# Patient Record
Sex: FEMALE | Race: WHITE | Employment: OTHER
[De-identification: names, ages, dates, MRNs, and addresses within clinical notes are randomized per-mention and may not be internally consistent; named-entity substitution may affect disease eponyms.]

---

## 2017-01-19 ENCOUNTER — TELEPHONE (OUTPATIENT)
Dept: NEUROLOGY | Facility: CLINIC | Age: 72
End: 2017-01-19

## 2017-03-08 DIAGNOSIS — G20 PARKINSON'S DISEASE (HCC): ICD-10-CM

## 2017-03-28 ENCOUNTER — OFFICE VISIT (OUTPATIENT)
Dept: NEUROLOGY | Age: 72
End: 2017-03-28
Payer: MEDICARE

## 2017-03-28 VITALS
HEIGHT: 62 IN | DIASTOLIC BLOOD PRESSURE: 74 MMHG | BODY MASS INDEX: 23 KG/M2 | SYSTOLIC BLOOD PRESSURE: 132 MMHG | WEIGHT: 125 LBS | HEART RATE: 80 BPM

## 2017-03-28 DIAGNOSIS — G20 PARKINSON'S DISEASE (HCC): Primary | ICD-10-CM

## 2017-03-28 PROCEDURE — 1036F TOBACCO NON-USER: CPT | Performed by: PSYCHIATRY & NEUROLOGY

## 2017-03-28 PROCEDURE — 99213 OFFICE O/P EST LOW 20 MIN: CPT | Performed by: PSYCHIATRY & NEUROLOGY

## 2017-03-28 PROCEDURE — 3017F COLORECTAL CA SCREEN DOC REV: CPT | Performed by: PSYCHIATRY & NEUROLOGY

## 2017-03-28 PROCEDURE — 1123F ACP DISCUSS/DSCN MKR DOCD: CPT | Performed by: PSYCHIATRY & NEUROLOGY

## 2017-03-28 PROCEDURE — G8399 PT W/DXA RESULTS DOCUMENT: HCPCS | Performed by: PSYCHIATRY & NEUROLOGY

## 2017-03-28 PROCEDURE — 3014F SCREEN MAMMO DOC REV: CPT | Performed by: PSYCHIATRY & NEUROLOGY

## 2017-03-28 PROCEDURE — G8420 CALC BMI NORM PARAMETERS: HCPCS | Performed by: PSYCHIATRY & NEUROLOGY

## 2017-03-28 PROCEDURE — G8484 FLU IMMUNIZE NO ADMIN: HCPCS | Performed by: PSYCHIATRY & NEUROLOGY

## 2017-03-28 PROCEDURE — 1090F PRES/ABSN URINE INCON ASSESS: CPT | Performed by: PSYCHIATRY & NEUROLOGY

## 2017-03-28 PROCEDURE — G8427 DOCREV CUR MEDS BY ELIG CLIN: HCPCS | Performed by: PSYCHIATRY & NEUROLOGY

## 2017-03-28 PROCEDURE — 4040F PNEUMOC VAC/ADMIN/RCVD: CPT | Performed by: PSYCHIATRY & NEUROLOGY

## 2017-04-10 ENCOUNTER — TELEPHONE (OUTPATIENT)
Dept: NEUROLOGY | Age: 72
End: 2017-04-10

## 2017-06-03 DIAGNOSIS — G20 PARKINSON'S DISEASE (HCC): ICD-10-CM

## 2017-06-05 RX ORDER — CARBIDOPA AND LEVODOPA 50; 200 MG/1; MG/1
TABLET, EXTENDED RELEASE ORAL
Qty: 90 TABLET | Refills: 0 | Status: SHIPPED | OUTPATIENT
Start: 2017-06-05 | End: 2017-08-18 | Stop reason: SDUPTHER

## 2017-06-28 ENCOUNTER — OFFICE VISIT (OUTPATIENT)
Dept: NEUROLOGY | Age: 72
End: 2017-06-28
Payer: MEDICARE

## 2017-06-28 VITALS
DIASTOLIC BLOOD PRESSURE: 69 MMHG | BODY MASS INDEX: 23.26 KG/M2 | HEIGHT: 62 IN | SYSTOLIC BLOOD PRESSURE: 118 MMHG | HEART RATE: 96 BPM | WEIGHT: 126.4 LBS

## 2017-06-28 DIAGNOSIS — G20 PARKINSON'S DISEASE (HCC): Primary | ICD-10-CM

## 2017-06-28 PROCEDURE — 99213 OFFICE O/P EST LOW 20 MIN: CPT | Performed by: PSYCHIATRY & NEUROLOGY

## 2017-06-28 PROCEDURE — G8399 PT W/DXA RESULTS DOCUMENT: HCPCS | Performed by: PSYCHIATRY & NEUROLOGY

## 2017-06-28 PROCEDURE — G8427 DOCREV CUR MEDS BY ELIG CLIN: HCPCS | Performed by: PSYCHIATRY & NEUROLOGY

## 2017-06-28 PROCEDURE — 1036F TOBACCO NON-USER: CPT | Performed by: PSYCHIATRY & NEUROLOGY

## 2017-06-28 PROCEDURE — 1123F ACP DISCUSS/DSCN MKR DOCD: CPT | Performed by: PSYCHIATRY & NEUROLOGY

## 2017-06-28 PROCEDURE — 3017F COLORECTAL CA SCREEN DOC REV: CPT | Performed by: PSYCHIATRY & NEUROLOGY

## 2017-06-28 PROCEDURE — G8420 CALC BMI NORM PARAMETERS: HCPCS | Performed by: PSYCHIATRY & NEUROLOGY

## 2017-06-28 PROCEDURE — 1090F PRES/ABSN URINE INCON ASSESS: CPT | Performed by: PSYCHIATRY & NEUROLOGY

## 2017-06-28 PROCEDURE — 4040F PNEUMOC VAC/ADMIN/RCVD: CPT | Performed by: PSYCHIATRY & NEUROLOGY

## 2017-06-28 PROCEDURE — 3014F SCREEN MAMMO DOC REV: CPT | Performed by: PSYCHIATRY & NEUROLOGY

## 2017-06-28 RX ORDER — ESCITALOPRAM OXALATE 20 MG/1
20 TABLET ORAL DAILY
COMMUNITY
Start: 2017-06-26

## 2017-06-28 RX ORDER — CARBIDOPA/LEVODOPA 25MG-250MG
1 TABLET ORAL 4 TIMES DAILY
Qty: 360 TABLET | Refills: 3 | Status: SHIPPED | OUTPATIENT
Start: 2017-06-28 | End: 2017-08-21 | Stop reason: SDUPTHER

## 2017-06-28 RX ORDER — TRAZODONE HYDROCHLORIDE 50 MG/1
1 TABLET ORAL NIGHTLY
COMMUNITY
Start: 2017-06-12

## 2017-07-18 RX ORDER — ROTIGOTINE 8 MG/24H
PATCH, EXTENDED RELEASE TRANSDERMAL
Qty: 90 PATCH | Refills: 0 | Status: SHIPPED | OUTPATIENT
Start: 2017-07-18 | End: 2017-10-27 | Stop reason: SDUPTHER

## 2017-07-20 ENCOUNTER — APPOINTMENT (OUTPATIENT)
Dept: GENERAL RADIOLOGY | Age: 72
End: 2017-07-20
Payer: MEDICARE

## 2017-07-20 ENCOUNTER — HOSPITAL ENCOUNTER (EMERGENCY)
Age: 72
Discharge: HOME | End: 2017-07-20
Payer: MEDICARE

## 2017-07-20 VITALS
TEMPERATURE: 98.4 F | HEIGHT: 61 IN | SYSTOLIC BLOOD PRESSURE: 124 MMHG | BODY MASS INDEX: 23.41 KG/M2 | DIASTOLIC BLOOD PRESSURE: 83 MMHG | WEIGHT: 124 LBS | RESPIRATION RATE: 14 BRPM | OXYGEN SATURATION: 97 % | HEART RATE: 103 BPM

## 2017-07-20 DIAGNOSIS — S60.052A CONTUSION OF LEFT LITTLE FINGER WITHOUT DAMAGE TO NAIL, INITIAL ENCOUNTER: Primary | ICD-10-CM

## 2017-07-20 PROCEDURE — 29130 APPL FINGER SPLINT STATIC: CPT

## 2017-07-20 PROCEDURE — 99283 EMERGENCY DEPT VISIT LOW MDM: CPT

## 2017-07-20 PROCEDURE — 73130 X-RAY EXAM OF HAND: CPT

## 2017-07-20 ASSESSMENT — PAIN DESCRIPTION - ORIENTATION: ORIENTATION: LEFT

## 2017-07-20 ASSESSMENT — PAIN DESCRIPTION - DESCRIPTORS: DESCRIPTORS: THROBBING

## 2017-07-20 ASSESSMENT — PAIN SCALES - GENERAL
PAINLEVEL_OUTOF10: 5
PAINLEVEL_OUTOF10: 5

## 2017-07-20 ASSESSMENT — PAIN DESCRIPTION - LOCATION: LOCATION: FINGER (COMMENT WHICH ONE)

## 2017-07-20 ASSESSMENT — PAIN DESCRIPTION - ONSET: ONSET: ON-GOING

## 2017-07-20 ASSESSMENT — PAIN DESCRIPTION - PAIN TYPE: TYPE: ACUTE PAIN

## 2017-07-20 ASSESSMENT — PAIN DESCRIPTION - PROGRESSION: CLINICAL_PROGRESSION: GRADUALLY WORSENING

## 2017-07-20 ASSESSMENT — PAIN DESCRIPTION - FREQUENCY: FREQUENCY: CONTINUOUS

## 2017-08-18 DIAGNOSIS — G20 PARKINSON'S DISEASE (HCC): ICD-10-CM

## 2017-08-21 RX ORDER — CARBIDOPA AND LEVODOPA 50; 200 MG/1; MG/1
TABLET, EXTENDED RELEASE ORAL
Qty: 90 TABLET | Refills: 0 | Status: SHIPPED | OUTPATIENT
Start: 2017-08-21 | End: 2017-11-24 | Stop reason: SDUPTHER

## 2017-10-09 ENCOUNTER — OFFICE VISIT (OUTPATIENT)
Dept: NEUROLOGY | Age: 72
End: 2017-10-09
Payer: MEDICARE

## 2017-10-09 VITALS
HEIGHT: 62 IN | HEART RATE: 96 BPM | SYSTOLIC BLOOD PRESSURE: 109 MMHG | WEIGHT: 125.6 LBS | BODY MASS INDEX: 23.11 KG/M2 | DIASTOLIC BLOOD PRESSURE: 69 MMHG

## 2017-10-09 DIAGNOSIS — G20 PARKINSON'S DISEASE (HCC): Primary | ICD-10-CM

## 2017-10-09 PROCEDURE — 4040F PNEUMOC VAC/ADMIN/RCVD: CPT | Performed by: PSYCHIATRY & NEUROLOGY

## 2017-10-09 PROCEDURE — 3014F SCREEN MAMMO DOC REV: CPT | Performed by: PSYCHIATRY & NEUROLOGY

## 2017-10-09 PROCEDURE — G8420 CALC BMI NORM PARAMETERS: HCPCS | Performed by: PSYCHIATRY & NEUROLOGY

## 2017-10-09 PROCEDURE — 3017F COLORECTAL CA SCREEN DOC REV: CPT | Performed by: PSYCHIATRY & NEUROLOGY

## 2017-10-09 PROCEDURE — 1090F PRES/ABSN URINE INCON ASSESS: CPT | Performed by: PSYCHIATRY & NEUROLOGY

## 2017-10-09 PROCEDURE — 99213 OFFICE O/P EST LOW 20 MIN: CPT | Performed by: PSYCHIATRY & NEUROLOGY

## 2017-10-09 PROCEDURE — G8484 FLU IMMUNIZE NO ADMIN: HCPCS | Performed by: PSYCHIATRY & NEUROLOGY

## 2017-10-09 PROCEDURE — 1123F ACP DISCUSS/DSCN MKR DOCD: CPT | Performed by: PSYCHIATRY & NEUROLOGY

## 2017-10-09 PROCEDURE — 1036F TOBACCO NON-USER: CPT | Performed by: PSYCHIATRY & NEUROLOGY

## 2017-10-09 PROCEDURE — G8427 DOCREV CUR MEDS BY ELIG CLIN: HCPCS | Performed by: PSYCHIATRY & NEUROLOGY

## 2017-10-09 PROCEDURE — G8399 PT W/DXA RESULTS DOCUMENT: HCPCS | Performed by: PSYCHIATRY & NEUROLOGY

## 2017-10-09 RX ORDER — CARBIDOPA/LEVODOPA 25MG-250MG
1 TABLET ORAL
Qty: 720 TABLET | Refills: 3 | Status: SHIPPED | OUTPATIENT
Start: 2017-10-09 | End: 2017-11-27 | Stop reason: SDUPTHER

## 2017-10-09 RX ORDER — CARBIDOPA/LEVODOPA 25MG-250MG
1 TABLET ORAL
COMMUNITY
Start: 2017-09-05 | End: 2017-11-27 | Stop reason: SDUPTHER

## 2017-10-09 RX ORDER — AMANTADINE HYDROCHLORIDE 100 MG/1
100 CAPSULE, GELATIN COATED ORAL
Qty: 30 CAPSULE | Refills: 1 | Status: SHIPPED | OUTPATIENT
Start: 2017-10-09 | End: 2017-12-02 | Stop reason: SDUPTHER

## 2017-10-09 RX ORDER — DIAZEPAM 5 MG/1
1 TABLET ORAL PRN
Status: ON HOLD | COMMUNITY
Start: 2017-08-09 | End: 2021-06-10 | Stop reason: HOSPADM

## 2017-10-09 NOTE — PROGRESS NOTES
HPI:        Your patient, Kaitlin Aguero returns in the company of her  for ongoing neurologic management of her Parkinson's disease. She has been a very difficult patient to manage since her condition still fluctuates from hour to hour and visit to visit. She uses Neupro, the 8 mg patch in addition to the fast acting Sinemet 25/250 at a tablet every 2 hours or up to 8 full pills a day. Also she uses a slow release Sinemet 50/200 at bedtime and this works very well overnight but occasionally she uses a second pill through the night since she is getting up several times to use the bathroom. Still her toes may curl once in a while but not like before and typically this happens in the morning before she gets started on her Sinemet regime. Dyskinetic movements may occur for brief periods during the day but she tolerates these changes well and prefers to continue the same dosing of medicine. As usual we talked about alternatives including Rytary or even deep brain stimulation (DBS) but she wants to give these options some thought before considering any change in treatment plan. I also strongly encouraged her to see a movement disorder expert such as Dr. Umesh Paredes at [facility] since I will be leaving practice shortly. There has been no recent trauma, infection/fever or intoxication to complicate matters nor any new medical or surgical issue.                                     REVIEW OF SYSTEMS    CONSTITUTIONAL Weight: absent, Appetite: absent, Fatigue: absent      HEENT Ears: normal, Eyes: glasses, Visual disturbance: absent   RESPIRATORY Shortness of breath: absent, Cough: absent   CARDIOVASCULAR Chest pain: absent, Leg swelling :absent      GI Constipation: absent, Diarrhea: absent, Swallowing change: absent       Urinary frequency: absent, Urinary urgency: absent, Urinary incontinence: absent   MUSCULOSKELETAL Neck pain: absent, Back pain: absent, Stiffness: present, Muscle pain: absent, Joint pain: absent, Restless legs: absent   DERMATOLOGIC Hair loss: absent, Skin changes: absent   NEUROLOGIC Memory loss: absent, Confusion: present, Seizures: absent Trouble walking or imbalance: present, Dizziness: absent, Weakness: absent, Numbness: absent, Tremor: present, Spasm: absent, Speech difficulty: absent, Headache: absent, Light sensitivity: absent   PSYCHIATRIC Anxiety: present, Hallucination: absent, Mood disorder: absent   HEMATOLOGIC Abnormal bleeding: absent, Anemia: absent, Clotting disorder: absent, Lymph gland changes: absent                   Outpatient Prescriptions Marked as Taking for the 10/9/17 encounter (Office Visit) with Jens Lewis MD   Medication Sig Dispense Refill    carbidopa-levodopa (SINEMET)  MG per tablet Take 1 tablet by mouth every 2 hours      diazepam (VALIUM) 5 MG tablet Take 1 tablet by mouth as needed      carbidopa-levodopa (SINEMET CR)  MG per extended release tablet TAKE 1 TABLET BY MOUTH NIGHTLY 90 tablet 0    NEUPRO 8 MG/24HR PT24 Apply 1 patch to skin every day as directed by physician. Alternate area with each change of patch. 90 patch 0    traZODone (DESYREL) 50 MG tablet Take 1 tablet by mouth as needed      escitalopram (LEXAPRO) 10 MG tablet Take 1 tablet by mouth daily      ALPRAZolam (XANAX) 0.25 MG tablet Take 0.25 mg by mouth 3 times daily      Magnesium 400 MG TABS Take 1 tablet by mouth daily      Calcium Carb-Cholecalciferol (CALCIUM + D3) 600-200 MG-UNIT TABS Take 1 tablet by mouth daily      Multiple Vitamins-Calcium (ONE-A-DAY WOMENS PO) Take 1 tablet by mouth daily      alendronate (FOSAMAX) 70 MG tablet Take 70 mg by mouth every 7 days                                            PHYSICAL EXAMINATION                                              .                                                                                                     General Appearance:  Alert, cooperative, signs of distress with dyskinesias in hands, legs and trunk, appears stated age, well dressed and groomed   Head:  Normocephalic, no signs of trauma   Eyes:  Conjunctiva/corneas clear;  eyelids intact   Ears:  Normal external ear and canals   Nose: Nares normal, mucosa normal, no drainage    Throat: Lips and tongue normal; teeth normal; gums normal   Neck: Supple neck with intact flexion, extension and rotation; trachea midline; no adenopathy; thyroid: not enlarged; no carotid pulse abnormality   Back:   Symmetric, no curvature, ROM adequate   Lungs:   Respirations unlabored   Chest Wall:  No deformity   Heart:  Regular rate and rhythm   Abdomen:   No masses   Extremities: Extremities normal, no cyanosis, no edema   Pulses: Symmetric over head and neck   Skin: Skin color, texture normal, no rashes, no lesions or bruises   Lymph nodes: Cervical, supraclavicular nodes normal                                         NEUROLOGIC EXAMINATION    MENTAL STATUS: Alert, oriented, intact memory, no confusion, normal speech, normal language, no hallucination or delusion   CRANIAL NERVES: II     -      Visual fields intact to confrontation  III,IV,VI -  EOMs full, no afferent defect, no                      MOHNA, no ptosis  V     -     Normal facial sensation  VII    -     Normal facial symmetry  VIII   -     Intact hearing  IX,X -     Symmetrical palate  XI    -     Symmetrical shoulder shrug  XII   -     Midline tongue, no atrophy    MOTOR FUNCTION: Normal bulk, normal tone, normal power but can overcome proximal limbs; nearly continuous dyskinetic movements of the head and limbs and trunk    SENSORY FUNCTION: Normal touch, normal pin, normal vibration   CEREBELLAR FUNCTION: Intact fine motor control over upper limbs   REFLEX FUNCTION: Symmetric but reduced, no perverted reflex   STATION and GAIT Normal station, easily up from a chair, does not lose balance               ASSESSMENT and  PLAN:      In summary, your patient, Canda Hashimoto appears to have more dyskinesias since boosting her Sinemet to a full tablet every 2 hours. Obviously she prefers this to being stiff, slow and drooling. She has no new lateralizing or localizing neurologic deficits but prominent dyskinesias. There is no need for additional neurologic investigation but I have suggested a change in therapy. She will try Amantadine 100 mg in the morning to control these dyskinesias and hopefully will not have any hallucinations or other ill effects from the drug. Meanwhile she will continue the present combination of Neupro and Sinemet. I will remain available for questions or any other issues that may arise and she will return for reevaluation in just one month so I can see if there are any problems with the Amantadine.

## 2017-10-09 NOTE — LETTER
[id number] [facility]  [address] [address]  Phone: [phone]  Fax: [phone]    Maura Arce MD        October 9, 2017     Maicol Chapman, [address] [address]    Patient: Jet Frank  MR Number: [id number]  YOB: 1945  Date of Visit: 10/9/2017    Dear Dr. Bruno Adhikari:        HPI:        Your patient, Jet Frank returns in the company of her  for ongoing neurologic management of her Parkinson's disease. She has been a very difficult patient to manage since her condition still fluctuates from hour to hour and visit to visit. She uses Neupro, the 8 mg patch in addition to the fast acting Sinemet 25/250 at a tablet every 2 hours or up to 8 full pills a day. Also she uses a slow release Sinemet 50/200 at bedtime and this works very well overnight but occasionally she uses a second pill through the night since she is getting up several times to use the bathroom. Still her toes may curl once in a while but not like before and typically this happens in the morning before she gets started on her Sinemet regime. Dyskinetic movements may occur for brief periods during the day but she tolerates these changes well and prefers to continue the same dosing of medicine. As usual we talked about alternatives including Rytary or even deep brain stimulation (DBS) but she wants to give these options some thought before considering any change in treatment plan. I also strongly encouraged her to see a movement disorder expert such as Dr. Carolann Yoder at [facility] since I will be leaving practice shortly. There has been no recent trauma, infection/fever or intoxication to complicate matters nor any new medical or surgical issue.                                     REVIEW OF SYSTEMS    CONSTITUTIONAL Weight: absent, Appetite: absent, Fatigue: absent      HEENT Ears: normal, Eyes: glasses, Visual disturbance: absent  Multiple Vitamins-Calcium (ONE-A-DAY WOMENS PO) Take 1 tablet by mouth daily      alendronate (FOSAMAX) 70 MG tablet Take 70 mg by mouth every 7 days                                            PHYSICAL EXAMINATION                                              .                                                                                                     General Appearance:  Alert, cooperative, signs of distress with dyskinesias in hands, legs and trunk, appears stated age, well dressed and groomed   Head:  Normocephalic, no signs of trauma   Eyes:  Conjunctiva/corneas clear;  eyelids intact   Ears:  Normal external ear and canals   Nose: Nares normal, mucosa normal, no drainage    Throat: Lips and tongue normal; teeth normal; gums normal   Neck: Supple neck with intact flexion, extension and rotation; trachea midline; no adenopathy; thyroid: not enlarged; no carotid pulse abnormality   Back:   Symmetric, no curvature, ROM adequate   Lungs:   Respirations unlabored   Chest Wall:  No deformity   Heart:  Regular rate and rhythm   Abdomen:   No masses   Extremities: Extremities normal, no cyanosis, no edema   Pulses: Symmetric over head and neck   Skin: Skin color, texture normal, no rashes, no lesions or bruises   Lymph nodes: Cervical, supraclavicular nodes normal                                         NEUROLOGIC EXAMINATION    MENTAL STATUS: Alert, oriented, intact memory, no confusion, normal speech, normal language, no hallucination or delusion   CRANIAL NERVES: II     -      Visual fields intact to confrontation  III,IV,VI -  EOMs full, no afferent defect, no                      MOHAN, no ptosis  V     -     Normal facial sensation  VII    -     Normal facial symmetry  VIII   -     Intact hearing  IX,X -     Symmetrical palate  XI    -     Symmetrical shoulder shrug  XII   -     Midline tongue, no atrophy    MOTOR FUNCTION: Normal bulk, normal tone, normal power but can overcome

## 2017-10-09 NOTE — COMMUNICATION BODY
HPI:        Your patient, Hyun Rodriguez returns in the company of her  for ongoing neurologic management of her Parkinson's disease. She has been a very difficult patient to manage since her condition still fluctuates from hour to hour and visit to visit. She uses Neupro, the 8 mg patch in addition to the fast acting Sinemet 25/250 at a tablet every 2 hours or up to 8 full pills a day. Also she uses a slow release Sinemet 50/200 at bedtime and this works very well overnight but occasionally she uses a second pill through the night since she is getting up several times to use the bathroom. Still her toes may curl once in a while but not like before and typically this happens in the morning before she gets started on her Sinemet regime. Dyskinetic movements may occur for brief periods during the day but she tolerates these changes well and prefers to continue the same dosing of medicine. As usual we talked about alternatives including Rytary or even deep brain stimulation (DBS) but she wants to give these options some thought before considering any change in treatment plan. I also strongly encouraged her to see a movement disorder expert such as Dr. Garrison Blanton at [facility] since I will be leaving practice shortly. There has been no recent trauma, infection/fever or intoxication to complicate matters nor any new medical or surgical issue.                                     REVIEW OF SYSTEMS    CONSTITUTIONAL Weight: absent, Appetite: absent, Fatigue: absent      HEENT Ears: normal, Eyes: glasses, Visual disturbance: absent   RESPIRATORY Shortness of breath: absent, Cough: absent   CARDIOVASCULAR Chest pain: absent, Leg swelling :absent      GI Constipation: absent, Diarrhea: absent, Swallowing change: absent       Urinary frequency: absent, Urinary urgency: absent, Urinary incontinence: absent   MUSCULOSKELETAL Neck pain: absent, Back pain: absent, Stiffness: present, Muscle pain: absent, Joint pain: and trunk, appears stated age, well dressed and groomed   Head:  Normocephalic, no signs of trauma   Eyes:  Conjunctiva/corneas clear;  eyelids intact   Ears:  Normal external ear and canals   Nose: Nares normal, mucosa normal, no drainage    Throat: Lips and tongue normal; teeth normal; gums normal   Neck: Supple neck with intact flexion, extension and rotation; trachea midline; no adenopathy; thyroid: not enlarged; no carotid pulse abnormality   Back:   Symmetric, no curvature, ROM adequate   Lungs:   Respirations unlabored   Chest Wall:  No deformity   Heart:  Regular rate and rhythm   Abdomen:   No masses   Extremities: Extremities normal, no cyanosis, no edema   Pulses: Symmetric over head and neck   Skin: Skin color, texture normal, no rashes, no lesions or bruises   Lymph nodes: Cervical, supraclavicular nodes normal                                         NEUROLOGIC EXAMINATION    MENTAL STATUS: Alert, oriented, intact memory, no confusion, normal speech, normal language, no hallucination or delusion   CRANIAL NERVES: II     -      Visual fields intact to confrontation  III,IV,VI -  EOMs full, no afferent defect, no                      MOHAN, no ptosis  V     -     Normal facial sensation  VII    -     Normal facial symmetry  VIII   -     Intact hearing  IX,X -     Symmetrical palate  XI    -     Symmetrical shoulder shrug  XII   -     Midline tongue, no atrophy    MOTOR FUNCTION: Normal bulk, normal tone, normal power but can overcome proximal limbs; nearly continuous dyskinetic movements of the head and limbs and trunk    SENSORY FUNCTION: Normal touch, normal pin, normal vibration   CEREBELLAR FUNCTION: Intact fine motor control over upper limbs   REFLEX FUNCTION: Symmetric but reduced, no perverted reflex   STATION and GAIT Normal station, easily up from a chair, does not lose balance               ASSESSMENT and  PLAN:      In summary, your patient, Ricky Hua appears to have more dyskinesias since

## 2017-10-30 RX ORDER — ROTIGOTINE 8 MG/24H
PATCH, EXTENDED RELEASE TRANSDERMAL
Qty: 90 PATCH | Refills: 0 | Status: SHIPPED | OUTPATIENT
Start: 2017-10-30 | End: 2019-11-22

## 2017-11-07 DIAGNOSIS — G20 PARKINSON'S DISEASE (HCC): ICD-10-CM

## 2017-11-15 ENCOUNTER — OFFICE VISIT (OUTPATIENT)
Dept: NEUROLOGY | Age: 72
End: 2017-11-15
Payer: MEDICARE

## 2017-11-15 VITALS
HEART RATE: 92 BPM | BODY MASS INDEX: 23.66 KG/M2 | WEIGHT: 128.6 LBS | HEIGHT: 62 IN | DIASTOLIC BLOOD PRESSURE: 75 MMHG | SYSTOLIC BLOOD PRESSURE: 114 MMHG

## 2017-11-15 DIAGNOSIS — G20 PARKINSON'S DISEASE (HCC): Primary | ICD-10-CM

## 2017-11-15 PROCEDURE — 1090F PRES/ABSN URINE INCON ASSESS: CPT | Performed by: PSYCHIATRY & NEUROLOGY

## 2017-11-15 PROCEDURE — G8399 PT W/DXA RESULTS DOCUMENT: HCPCS | Performed by: PSYCHIATRY & NEUROLOGY

## 2017-11-15 PROCEDURE — 1036F TOBACCO NON-USER: CPT | Performed by: PSYCHIATRY & NEUROLOGY

## 2017-11-15 PROCEDURE — 4040F PNEUMOC VAC/ADMIN/RCVD: CPT | Performed by: PSYCHIATRY & NEUROLOGY

## 2017-11-15 PROCEDURE — 3014F SCREEN MAMMO DOC REV: CPT | Performed by: PSYCHIATRY & NEUROLOGY

## 2017-11-15 PROCEDURE — G8427 DOCREV CUR MEDS BY ELIG CLIN: HCPCS | Performed by: PSYCHIATRY & NEUROLOGY

## 2017-11-15 PROCEDURE — G8420 CALC BMI NORM PARAMETERS: HCPCS | Performed by: PSYCHIATRY & NEUROLOGY

## 2017-11-15 PROCEDURE — 99213 OFFICE O/P EST LOW 20 MIN: CPT | Performed by: PSYCHIATRY & NEUROLOGY

## 2017-11-15 PROCEDURE — 3017F COLORECTAL CA SCREEN DOC REV: CPT | Performed by: PSYCHIATRY & NEUROLOGY

## 2017-11-15 PROCEDURE — 1123F ACP DISCUSS/DSCN MKR DOCD: CPT | Performed by: PSYCHIATRY & NEUROLOGY

## 2017-11-15 PROCEDURE — G8484 FLU IMMUNIZE NO ADMIN: HCPCS | Performed by: PSYCHIATRY & NEUROLOGY

## 2017-11-15 NOTE — PROGRESS NOTES
HPI:        Your patient, Pastor Harris returns in the company of her  for continuing neurologic care of her Parkinson's disease. She has been a very difficult patient to manage since her condition still fluctuates from hour to hour and visit to visit. She still uses Neupro, the 8 mg patch in addition to the fast acting Sinemet 25/250 at a tablet every 2 hours or up to 8 full pills a day but alternates a whole with a half depending on how she feels. Also she uses a slow release Sinemet 50/200 at bedtime and this works very well overnight but occasionally she uses a second pill through the night since she is getting up several times to use the bathroom. Still her toes may curl once in a while but not like before and typically this happens in the morning before she gets started on her Sinemet regime. Dyskinetic movements may occur for brief periods during the day but she tolerates these changes well and prefers to continue the same dosing of medicine. It seems the Amantadine may be helping this. As usual we talked about alternatives including Rytary or even deep brain stimulation (DBS) but she wants to give these options some thought before considering any change in treatment plan. I also strongly encouraged her to see a movement disorder expert such as Dr. Sherri Abarca at [facility] and she got an appointment in January. There has been no recent trauma, infection/fever or intoxication to complicate matters nor any new medical or surgical issue.                                     REVIEW OF SYSTEMS    CONSTITUTIONAL Weight: absent, Appetite: absent, Fatigue: absent      HEENT Ears: normal, Eyes: glasses, Visual disturbance: absent   RESPIRATORY Shortness of breath: absent, Cough: absent   CARDIOVASCULAR Chest pain: absent, Leg swelling :absent      GI Constipation: absent, Diarrhea: absent, Swallowing change: absent       Urinary frequency: absent, Urinary urgency: absent, Urinary incontinence: absent FUNCTION: Symmetric but reduced, no perverted reflex   STATION and GAIT Normal station, easily up from a chair, does not lose balance, can stand on 1 foot               ASSESSMENT and  PLAN:      In summary, your patient, Gregoria Barclay appears the best I have seen her in a while. There are no new lateralizing or localizing neurologic deficits or signs of toxicity from the medications but continued dyskinesias. There is no need for additional neurologic investigation or a change in therapy. Her medical treatment program appears maximized and recently helped by the addition of Amantadine. She will continue the present combination of Neupro and Sinemet as well and follow-up with Dr. Reji Mendes. I will remain available for questions or any other issues that may arise in the interval but I have set no follow-up appointment for her at this office.

## 2017-11-15 NOTE — COMMUNICATION BODY
.                                                                                                    General Appearance:  Alert, cooperative, less signs of distress with dyskinesias in hands, legs and trunk, appears stated age, well dressed and groomed, \"today is a good day \"   Head:  Normocephalic, no signs of trauma   Eyes:  Conjunctiva/corneas clear;  eyelids intact   Ears:  Normal external ear and canals   Nose: Nares normal, mucosa normal, no drainage    Throat: Lips and tongue normal; teeth normal; gums normal   Neck: Supple neck with intact flexion, extension and rotation; trachea midline; no adenopathy; thyroid: not enlarged; no carotid pulse abnormality   Back:   Symmetric, no curvature, ROM adequate   Lungs:   Respirations unlabored   Chest Wall:  No deformity   Heart:  Regular rate and rhythm   Abdomen:   No masses   Extremities: Extremities normal, no cyanosis, no edema   Pulses: Symmetric over head and neck   Skin: Skin color, texture normal, no rashes, no lesions or bruises   Lymph nodes: Cervical, supraclavicular nodes normal                                         NEUROLOGIC EXAMINATION    MENTAL STATUS: Alert, oriented, intact memory, no confusion, normal speech, normal language, no hallucination or delusion   CRANIAL NERVES: II     -      Visual fields intact to confrontation  III,IV,VI -  EOMs full, no afferent defect, no                      MOHAN, no ptosis  V     -     Normal facial sensation  VII    -     Normal facial symmetry  VIII   -     Intact hearing  IX,X -     Symmetrical palate  XI    -     Symmetrical shoulder shrug  XII   -     Midline tongue, no atrophy    MOTOR FUNCTION: Normal bulk, normal tone, normal power but can overcome proximal limbs; intermittent dyskinetic movements of the head and limbs and trunk    SENSORY FUNCTION: Normal touch, normal pin, normal vibration   CEREBELLAR FUNCTION: Intact fine motor control over upper limbs   REFLEX

## 2017-11-15 NOTE — LETTER
[id number] [facility] Neurology Specialist  [place] [address] [address] [address]  Phone: [phone]  Fax: [phone]    Claudia Epstein MD        November 15, 2017     Maicol Darling MD  [address] [place] [address] [id number]    Patient: Carlos Enrique Kirkland  MR Number: [id number]  YOB: 1945  Date of Visit: 11/15/2017    Dear Dr. Edward Staff:        HPI:        Your patient, Carlos Enrique Kirkland returns in the company of her  for continuing neurologic care of her Parkinson's disease. She has been a very difficult patient to manage since her condition still fluctuates from hour to hour and visit to visit. She still uses Neupro, the 8 mg patch in addition to the fast acting Sinemet 25/250 at a tablet every 2 hours or up to 8 full pills a day but alternates a whole with a half depending on how she feels. Also she uses a slow release Sinemet 50/200 at bedtime and this works very well overnight but occasionally she uses a second pill through the night since she is getting up several times to use the bathroom. Still her toes may curl once in a while but not like before and typically this happens in the morning before she gets started on her Sinemet regime. Dyskinetic movements may occur for brief periods during the day but she tolerates these changes well and prefers to continue the same dosing of medicine. It seems the Amantadine may be helping this. As usual we talked about alternatives including Rytary or even deep brain stimulation (DBS) but she wants to give these options some thought before considering any change in treatment plan. I also strongly encouraged her to see a movement disorder expert such as Dr. Alana Roche at [facility] and she got an appointment in January. There has been no recent trauma, infection/fever or intoxication to complicate matters nor any new medical or surgical issue.                                     REVIEW OF SYSTEMS  ALPRAZolam (XANAX) 0.25 MG tablet Take 0.25 mg by mouth 3 times daily      Magnesium 400 MG TABS Take 1 tablet by mouth daily      Calcium Carb-Cholecalciferol (CALCIUM + D3) 600-200 MG-UNIT TABS Take 1 tablet by mouth daily      Multiple Vitamins-Calcium (ONE-A-DAY WOMENS PO) Take 1 tablet by mouth daily      alendronate (FOSAMAX) 70 MG tablet Take 70 mg by mouth every 7 days                                            PHYSICAL EXAMINATION                                              .                                                                                                     General Appearance:  Alert, cooperative, less signs of distress with dyskinesias in hands, legs and trunk, appears stated age, well dressed and groomed, \"today is a good day \"   Head:  Normocephalic, no signs of trauma   Eyes:  Conjunctiva/corneas clear;  eyelids intact   Ears:  Normal external ear and canals   Nose: Nares normal, mucosa normal, no drainage    Throat: Lips and tongue normal; teeth normal; gums normal   Neck: Supple neck with intact flexion, extension and rotation; trachea midline; no adenopathy; thyroid: not enlarged; no carotid pulse abnormality   Back:   Symmetric, no curvature, ROM adequate   Lungs:   Respirations unlabored   Chest Wall:  No deformity   Heart:  Regular rate and rhythm   Abdomen:   No masses   Extremities: Extremities normal, no cyanosis, no edema   Pulses: Symmetric over head and neck   Skin: Skin color, texture normal, no rashes, no lesions or bruises   Lymph nodes: Cervical, supraclavicular nodes normal                                         NEUROLOGIC EXAMINATION    MENTAL STATUS: Alert, oriented, intact memory, no confusion, normal speech, normal language, no hallucination or delusion   CRANIAL NERVES: II     -      Visual fields intact to confrontation  III,IV,VI -  EOMs full, no afferent defect, no                      MOHAN, no ptosis  V     -     Normal facial sensation VII    -     Normal facial symmetry  VIII   -     Intact hearing  IX,X -     Symmetrical palate  XI    -     Symmetrical shoulder shrug  XII   -     Midline tongue, no atrophy    MOTOR FUNCTION: Normal bulk, normal tone, normal power but can overcome proximal limbs; intermittent dyskinetic movements of the head and limbs and trunk    SENSORY FUNCTION: Normal touch, normal pin, normal vibration   CEREBELLAR FUNCTION: Intact fine motor control over upper limbs   REFLEX FUNCTION: Symmetric but reduced, no perverted reflex   STATION and GAIT Normal station, easily up from a chair, does not lose balance, can stand on 1 foot               ASSESSMENT and  PLAN:      In summary, your patient, Davis Williamson appears the best I have seen her in a while. There are no new lateralizing or localizing neurologic deficits or signs of toxicity from the medications but continued dyskinesias. There is no need for additional neurologic investigation or a change in therapy. Her medical treatment program appears maximized and recently helped by the addition of Amantadine. She will continue the present combination of Neupro and Sinemet as well and follow-up with Dr. Justine Sánchez. I will remain available for questions or any other issues that may arise in the interval but I have set no follow-up appointment for her at this office. If you have questions, please do not hesitate to call me.        Sincerely,        Liana Tan MD

## 2017-11-24 DIAGNOSIS — G20 PARKINSON'S DISEASE (HCC): ICD-10-CM

## 2017-11-27 RX ORDER — CARBIDOPA AND LEVODOPA 50; 200 MG/1; MG/1
TABLET, EXTENDED RELEASE ORAL
Qty: 90 TABLET | Refills: 0 | Status: SHIPPED | OUTPATIENT
Start: 2017-11-27 | End: 2019-09-30

## 2017-12-02 DIAGNOSIS — G20 PARKINSON'S DISEASE (HCC): ICD-10-CM

## 2017-12-04 RX ORDER — AMANTADINE HYDROCHLORIDE 100 MG/1
100 CAPSULE, GELATIN COATED ORAL
Qty: 30 CAPSULE | Refills: 1 | Status: SHIPPED | OUTPATIENT
Start: 2017-12-04 | End: 2019-11-22

## 2018-04-29 ENCOUNTER — APPOINTMENT (OUTPATIENT)
Dept: CT IMAGING | Age: 73
End: 2018-04-29
Payer: MEDICARE

## 2018-04-29 ENCOUNTER — HOSPITAL ENCOUNTER (EMERGENCY)
Age: 73
Discharge: HOME | End: 2018-04-29
Attending: EMERGENCY MEDICINE
Payer: MEDICARE

## 2018-04-29 VITALS
HEIGHT: 62 IN | SYSTOLIC BLOOD PRESSURE: 114 MMHG | OXYGEN SATURATION: 94 % | DIASTOLIC BLOOD PRESSURE: 91 MMHG | HEART RATE: 98 BPM | TEMPERATURE: 97.9 F | BODY MASS INDEX: 23.19 KG/M2 | RESPIRATION RATE: 18 BRPM | WEIGHT: 126 LBS

## 2018-04-29 DIAGNOSIS — S22.42XA CLOSED FRACTURE OF MULTIPLE RIBS OF LEFT SIDE, INITIAL ENCOUNTER: Primary | ICD-10-CM

## 2018-04-29 LAB
ABSOLUTE EOS #: 0.2 K/UL (ref 0–0.4)
ABSOLUTE IMMATURE GRANULOCYTE: ABNORMAL K/UL (ref 0–0.3)
ABSOLUTE LYMPH #: 1.2 K/UL (ref 1–4.8)
ABSOLUTE MONO #: 0.5 K/UL (ref 0.2–0.8)
ANION GAP SERPL CALCULATED.3IONS-SCNC: 12 MMOL/L (ref 9–17)
BASOPHILS # BLD: 0 % (ref 0–2)
BASOPHILS ABSOLUTE: 0 K/UL (ref 0–0.2)
BILIRUBIN URINE: NEGATIVE
BUN BLDV-MCNC: 16 MG/DL (ref 8–23)
BUN/CREAT BLD: 29 (ref 9–20)
CALCIUM SERPL-MCNC: 9.4 MG/DL (ref 8.6–10.4)
CHLORIDE BLD-SCNC: 100 MMOL/L (ref 98–107)
CO2: 27 MMOL/L (ref 20–31)
COLOR: YELLOW
COMMENT UA: ABNORMAL
CREAT SERPL-MCNC: 0.55 MG/DL (ref 0.5–0.9)
DIFFERENTIAL TYPE: ABNORMAL
EOSINOPHILS RELATIVE PERCENT: 2 % (ref 1–4)
GFR AFRICAN AMERICAN: >60 ML/MIN
GFR NON-AFRICAN AMERICAN: >60 ML/MIN
GFR SERPL CREATININE-BSD FRML MDRD: ABNORMAL ML/MIN/{1.73_M2}
GFR SERPL CREATININE-BSD FRML MDRD: ABNORMAL ML/MIN/{1.73_M2}
GLUCOSE BLD-MCNC: 103 MG/DL (ref 70–99)
GLUCOSE URINE: NEGATIVE
HCT VFR BLD CALC: 37.5 % (ref 36–46)
HEMOGLOBIN: 12.3 G/DL (ref 12–16)
IMMATURE GRANULOCYTES: ABNORMAL %
KETONES, URINE: ABNORMAL
LEUKOCYTE ESTERASE, URINE: NEGATIVE
LYMPHOCYTES # BLD: 15 % (ref 24–44)
MCH RBC QN AUTO: 31.6 PG (ref 26–34)
MCHC RBC AUTO-ENTMCNC: 32.8 G/DL (ref 31–37)
MCV RBC AUTO: 96.3 FL (ref 80–100)
MONOCYTES # BLD: 6 % (ref 1–7)
NITRITE, URINE: NEGATIVE
NRBC AUTOMATED: ABNORMAL PER 100 WBC
PDW BLD-RTO: 15.7 % (ref 11.5–14.5)
PH UA: 5.5 (ref 5–8)
PLATELET # BLD: 295 K/UL (ref 130–400)
PLATELET ESTIMATE: ABNORMAL
PMV BLD AUTO: 7.2 FL (ref 6–12)
POTASSIUM SERPL-SCNC: 3.8 MMOL/L (ref 3.7–5.3)
PROTEIN UA: NEGATIVE
RBC # BLD: 3.9 M/UL (ref 4–5.2)
RBC # BLD: ABNORMAL 10*6/UL
SEG NEUTROPHILS: 77 % (ref 36–66)
SEGMENTED NEUTROPHILS ABSOLUTE COUNT: 6 K/UL (ref 1.8–7.7)
SODIUM BLD-SCNC: 139 MMOL/L (ref 135–144)
SPECIFIC GRAVITY UA: 1.02 (ref 1–1.03)
TURBIDITY: CLEAR
URINE HGB: NEGATIVE
UROBILINOGEN, URINE: NORMAL
WBC # BLD: 7.9 K/UL (ref 3.5–11)
WBC # BLD: ABNORMAL 10*3/UL

## 2018-04-29 PROCEDURE — 6360000002 HC RX W HCPCS: Performed by: EMERGENCY MEDICINE

## 2018-04-29 PROCEDURE — 6360000004 HC RX CONTRAST MEDICATION: Performed by: EMERGENCY MEDICINE

## 2018-04-29 PROCEDURE — 71260 CT THORAX DX C+: CPT

## 2018-04-29 PROCEDURE — 96374 THER/PROPH/DIAG INJ IV PUSH: CPT

## 2018-04-29 PROCEDURE — 2580000003 HC RX 258: Performed by: EMERGENCY MEDICINE

## 2018-04-29 PROCEDURE — 99284 EMERGENCY DEPT VISIT MOD MDM: CPT

## 2018-04-29 PROCEDURE — 85025 COMPLETE CBC W/AUTO DIFF WBC: CPT

## 2018-04-29 PROCEDURE — 96361 HYDRATE IV INFUSION ADD-ON: CPT

## 2018-04-29 PROCEDURE — 80048 BASIC METABOLIC PNL TOTAL CA: CPT

## 2018-04-29 PROCEDURE — 74177 CT ABD & PELVIS W/CONTRAST: CPT

## 2018-04-29 RX ORDER — SODIUM CHLORIDE 9 MG/ML
INJECTION, SOLUTION INTRAVENOUS CONTINUOUS
Status: DISCONTINUED | OUTPATIENT
Start: 2018-04-29 | End: 2018-04-29 | Stop reason: HOSPADM

## 2018-04-29 RX ORDER — 0.9 % SODIUM CHLORIDE 0.9 %
50 INTRAVENOUS SOLUTION INTRAVENOUS ONCE
Status: COMPLETED | OUTPATIENT
Start: 2018-04-29 | End: 2018-04-29

## 2018-04-29 RX ORDER — OXYCODONE HYDROCHLORIDE AND ACETAMINOPHEN 5; 325 MG/1; MG/1
1 TABLET ORAL 2 TIMES DAILY PRN
Qty: 14 TABLET | Refills: 0 | Status: SHIPPED | OUTPATIENT
Start: 2018-04-29 | End: 2018-05-06

## 2018-04-29 RX ORDER — LORAZEPAM 2 MG/ML
0.5 INJECTION INTRAMUSCULAR ONCE
Status: COMPLETED | OUTPATIENT
Start: 2018-04-29 | End: 2018-04-29

## 2018-04-29 RX ORDER — BACLOFEN 10 MG/1
10 TABLET ORAL 3 TIMES DAILY PRN
COMMUNITY

## 2018-04-29 RX ORDER — SODIUM CHLORIDE 0.9 % (FLUSH) 0.9 %
10 SYRINGE (ML) INJECTION PRN
Status: DISCONTINUED | OUTPATIENT
Start: 2018-04-29 | End: 2018-04-29 | Stop reason: HOSPADM

## 2018-04-29 RX ADMIN — SODIUM CHLORIDE 50 ML: 9 INJECTION, SOLUTION INTRAVENOUS at 16:11

## 2018-04-29 RX ADMIN — SODIUM CHLORIDE: 9 INJECTION, SOLUTION INTRAVENOUS at 15:50

## 2018-04-29 RX ADMIN — IOPAMIDOL 125 ML: 755 INJECTION, SOLUTION INTRAVENOUS at 16:10

## 2018-04-29 RX ADMIN — Medication 10 ML: at 16:11

## 2018-04-29 RX ADMIN — LORAZEPAM 0.5 MG: 2 INJECTION, SOLUTION INTRAMUSCULAR; INTRAVENOUS at 15:46

## 2018-04-29 ASSESSMENT — PAIN DESCRIPTION - PAIN TYPE: TYPE: ACUTE PAIN

## 2018-04-29 ASSESSMENT — PAIN DESCRIPTION - ORIENTATION: ORIENTATION: LEFT

## 2018-04-29 ASSESSMENT — PAIN SCALES - GENERAL: PAINLEVEL_OUTOF10: 10

## 2018-04-29 ASSESSMENT — PAIN DESCRIPTION - LOCATION: LOCATION: FLANK

## 2018-04-29 ASSESSMENT — PAIN DESCRIPTION - DESCRIPTORS: DESCRIPTORS: STABBING;SHARP

## 2018-08-22 ENCOUNTER — HOSPITAL ENCOUNTER (OUTPATIENT)
Dept: ULTRASOUND IMAGING | Age: 73
Discharge: HOME | End: 2018-08-24
Payer: MEDICARE

## 2018-08-22 DIAGNOSIS — S80.02XA CONTUSION OF LEFT KNEE, INITIAL ENCOUNTER: ICD-10-CM

## 2018-08-22 PROCEDURE — 76882 US LMTD JT/FCL EVL NVASC XTR: CPT

## 2019-09-30 ENCOUNTER — HOSPITAL ENCOUNTER (EMERGENCY)
Age: 74
Discharge: HOME | End: 2019-09-30
Attending: EMERGENCY MEDICINE
Payer: MEDICARE

## 2019-09-30 ENCOUNTER — APPOINTMENT (OUTPATIENT)
Dept: GENERAL RADIOLOGY | Age: 74
End: 2019-09-30
Payer: MEDICARE

## 2019-09-30 VITALS
HEIGHT: 60 IN | BODY MASS INDEX: 25.13 KG/M2 | SYSTOLIC BLOOD PRESSURE: 111 MMHG | OXYGEN SATURATION: 96 % | WEIGHT: 128 LBS | DIASTOLIC BLOOD PRESSURE: 94 MMHG | RESPIRATION RATE: 35 BRPM | HEART RATE: 108 BPM | TEMPERATURE: 97.4 F

## 2019-09-30 DIAGNOSIS — R07.9 CHEST PAIN, UNSPECIFIED TYPE: Primary | ICD-10-CM

## 2019-09-30 LAB
-: ABNORMAL
ABSOLUTE EOS #: 0.31 K/UL (ref 0–0.44)
ABSOLUTE IMMATURE GRANULOCYTE: 0.04 K/UL (ref 0–0.3)
ABSOLUTE LYMPH #: 1.53 K/UL (ref 1.1–3.7)
ABSOLUTE MONO #: 0.8 K/UL (ref 0.1–1.2)
AMORPHOUS: ABNORMAL
ANION GAP SERPL CALCULATED.3IONS-SCNC: 11 MMOL/L (ref 9–17)
BACTERIA: ABNORMAL
BASOPHILS # BLD: 1 % (ref 0–2)
BASOPHILS ABSOLUTE: 0.1 K/UL (ref 0–0.2)
BILIRUBIN URINE: NEGATIVE
BNP INTERPRETATION: ABNORMAL
BUN BLDV-MCNC: 23 MG/DL (ref 8–23)
BUN/CREAT BLD: 37 (ref 9–20)
CALCIUM SERPL-MCNC: 9.7 MG/DL (ref 8.6–10.4)
CASTS UA: ABNORMAL /LPF
CHLORIDE BLD-SCNC: 103 MMOL/L (ref 98–107)
CO2: 28 MMOL/L (ref 20–31)
COLOR: YELLOW
COMMENT UA: ABNORMAL
CREAT SERPL-MCNC: 0.62 MG/DL (ref 0.5–0.9)
CRYSTALS, UA: ABNORMAL /HPF
DIFFERENTIAL TYPE: ABNORMAL
EOSINOPHILS RELATIVE PERCENT: 4 % (ref 1–4)
EPITHELIAL CELLS UA: ABNORMAL /HPF (ref 0–5)
GFR AFRICAN AMERICAN: >60 ML/MIN
GFR NON-AFRICAN AMERICAN: >60 ML/MIN
GFR SERPL CREATININE-BSD FRML MDRD: ABNORMAL ML/MIN/{1.73_M2}
GFR SERPL CREATININE-BSD FRML MDRD: ABNORMAL ML/MIN/{1.73_M2}
GLUCOSE BLD-MCNC: 129 MG/DL (ref 70–99)
GLUCOSE URINE: NEGATIVE
HCT VFR BLD CALC: 38.9 % (ref 36.3–47.1)
HEMOGLOBIN: 11.7 G/DL (ref 11.9–15.1)
IMMATURE GRANULOCYTES: 1 %
KETONES, URINE: ABNORMAL
LEUKOCYTE ESTERASE, URINE: ABNORMAL
LYMPHOCYTES # BLD: 18 % (ref 24–43)
MCH RBC QN AUTO: 31 PG (ref 25.2–33.5)
MCHC RBC AUTO-ENTMCNC: 30.1 G/DL (ref 28.4–34.8)
MCV RBC AUTO: 103.2 FL (ref 82.6–102.9)
MONOCYTES # BLD: 9 % (ref 3–12)
MUCUS: ABNORMAL
MYOGLOBIN: 23 NG/ML (ref 25–58)
NITRITE, URINE: NEGATIVE
NRBC AUTOMATED: 0 PER 100 WBC
OTHER OBSERVATIONS UA: ABNORMAL
PDW BLD-RTO: 14.9 % (ref 11.8–14.4)
PH UA: 5.5 (ref 5–8)
PLATELET # BLD: 382 K/UL (ref 138–453)
PLATELET ESTIMATE: ABNORMAL
PMV BLD AUTO: 9.3 FL (ref 8.1–13.5)
POTASSIUM SERPL-SCNC: 4.3 MMOL/L (ref 3.7–5.3)
PRO-BNP: 523 PG/ML
PROTEIN UA: NEGATIVE
RBC # BLD: 3.77 M/UL (ref 3.95–5.11)
RBC # BLD: ABNORMAL 10*6/UL
RBC UA: ABNORMAL /HPF (ref 0–2)
RENAL EPITHELIAL, UA: ABNORMAL /HPF
SEG NEUTROPHILS: 67 % (ref 36–65)
SEGMENTED NEUTROPHILS ABSOLUTE COUNT: 5.73 K/UL (ref 1.5–8.1)
SODIUM BLD-SCNC: 142 MMOL/L (ref 135–144)
SPECIFIC GRAVITY UA: 1.02 (ref 1–1.03)
TRICHOMONAS: ABNORMAL
TROPONIN INTERP: ABNORMAL
TROPONIN T: ABNORMAL NG/ML
TROPONIN, HIGH SENSITIVITY: 16 NG/L (ref 0–14)
TURBIDITY: CLEAR
URINE HGB: NEGATIVE
UROBILINOGEN, URINE: NORMAL
WBC # BLD: 8.5 K/UL (ref 3.5–11.3)
WBC # BLD: ABNORMAL 10*3/UL
WBC UA: ABNORMAL /HPF (ref 0–5)
YEAST: ABNORMAL

## 2019-09-30 PROCEDURE — 87086 URINE CULTURE/COLONY COUNT: CPT

## 2019-09-30 PROCEDURE — 85025 COMPLETE CBC W/AUTO DIFF WBC: CPT

## 2019-09-30 PROCEDURE — 80048 BASIC METABOLIC PNL TOTAL CA: CPT

## 2019-09-30 PROCEDURE — 99285 EMERGENCY DEPT VISIT HI MDM: CPT

## 2019-09-30 PROCEDURE — 71045 X-RAY EXAM CHEST 1 VIEW: CPT

## 2019-09-30 PROCEDURE — 93005 ELECTROCARDIOGRAM TRACING: CPT

## 2019-09-30 PROCEDURE — 83874 ASSAY OF MYOGLOBIN: CPT

## 2019-09-30 PROCEDURE — 81001 URINALYSIS AUTO W/SCOPE: CPT

## 2019-09-30 PROCEDURE — 83880 ASSAY OF NATRIURETIC PEPTIDE: CPT

## 2019-09-30 PROCEDURE — 6370000000 HC RX 637 (ALT 250 FOR IP): Performed by: NURSE PRACTITIONER

## 2019-09-30 PROCEDURE — 84484 ASSAY OF TROPONIN QUANT: CPT

## 2019-09-30 RX ORDER — ASPIRIN 81 MG/1
324 TABLET, CHEWABLE ORAL ONCE
Status: COMPLETED | OUTPATIENT
Start: 2019-09-30 | End: 2019-09-30

## 2019-09-30 RX ORDER — NITROGLYCERIN 0.4 MG/1
0.4 TABLET SUBLINGUAL EVERY 5 MIN PRN
Status: DISCONTINUED | OUTPATIENT
Start: 2019-09-30 | End: 2019-09-30 | Stop reason: HOSPADM

## 2019-09-30 RX ADMIN — ASPIRIN 81 MG 324 MG: 81 TABLET ORAL at 17:42

## 2019-09-30 ASSESSMENT — ENCOUNTER SYMPTOMS
ABDOMINAL PAIN: 0
RHINORRHEA: 0
NAUSEA: 0
WHEEZING: 0
SORE THROAT: 0
VOMITING: 0
CONSTIPATION: 0
SHORTNESS OF BREATH: 0
DIARRHEA: 0
COLOR CHANGE: 0
COUGH: 0
SINUS PRESSURE: 0

## 2019-09-30 ASSESSMENT — PAIN DESCRIPTION - LOCATION: LOCATION: CHEST

## 2019-09-30 ASSESSMENT — PAIN SCALES - GENERAL: PAINLEVEL_OUTOF10: 4

## 2019-09-30 ASSESSMENT — PAIN DESCRIPTION - DESCRIPTORS: DESCRIPTORS: TIGHTNESS

## 2019-09-30 ASSESSMENT — PAIN DESCRIPTION - FREQUENCY: FREQUENCY: INTERMITTENT

## 2019-09-30 ASSESSMENT — PAIN DESCRIPTION - ONSET: ONSET: ON-GOING

## 2019-09-30 ASSESSMENT — PAIN DESCRIPTION - PAIN TYPE: TYPE: ACUTE PAIN

## 2019-10-01 LAB
CULTURE: NORMAL
EKG ATRIAL RATE: 109 BPM
EKG P AXIS: 31 DEGREES
EKG P-R INTERVAL: 124 MS
EKG Q-T INTERVAL: 332 MS
EKG QRS DURATION: 66 MS
EKG QTC CALCULATION (BAZETT): 447 MS
EKG R AXIS: 30 DEGREES
EKG T AXIS: 59 DEGREES
EKG VENTRICULAR RATE: 109 BPM
Lab: NORMAL
SPECIMEN DESCRIPTION: NORMAL

## 2019-11-22 ENCOUNTER — HOSPITAL ENCOUNTER (EMERGENCY)
Age: 74
Discharge: HOME | End: 2019-11-22
Attending: EMERGENCY MEDICINE
Payer: MEDICARE

## 2019-11-22 VITALS
HEART RATE: 89 BPM | RESPIRATION RATE: 16 BRPM | OXYGEN SATURATION: 97 % | BODY MASS INDEX: 26.31 KG/M2 | TEMPERATURE: 97.9 F | HEIGHT: 60 IN | WEIGHT: 134 LBS | DIASTOLIC BLOOD PRESSURE: 57 MMHG | SYSTOLIC BLOOD PRESSURE: 121 MMHG

## 2019-11-22 DIAGNOSIS — S01.112A LEFT EYELID LACERATION, INITIAL ENCOUNTER: ICD-10-CM

## 2019-11-22 DIAGNOSIS — S09.90XA INJURY OF HEAD, INITIAL ENCOUNTER: Primary | ICD-10-CM

## 2019-11-22 PROCEDURE — 99283 EMERGENCY DEPT VISIT LOW MDM: CPT

## 2019-11-22 PROCEDURE — 6360000002 HC RX W HCPCS: Performed by: NURSE PRACTITIONER

## 2019-11-22 PROCEDURE — 90471 IMMUNIZATION ADMIN: CPT | Performed by: NURSE PRACTITIONER

## 2019-11-22 PROCEDURE — 90715 TDAP VACCINE 7 YRS/> IM: CPT | Performed by: NURSE PRACTITIONER

## 2019-11-22 PROCEDURE — 12011 RPR F/E/E/N/L/M 2.5 CM/<: CPT

## 2019-11-22 PROCEDURE — 2500000003 HC RX 250 WO HCPCS: Performed by: NURSE PRACTITIONER

## 2019-11-22 PROCEDURE — 6370000000 HC RX 637 (ALT 250 FOR IP): Performed by: NURSE PRACTITIONER

## 2019-11-22 RX ORDER — LIDOCAINE HYDROCHLORIDE 10 MG/ML
20 INJECTION, SOLUTION INFILTRATION; PERINEURAL ONCE
Status: COMPLETED | OUTPATIENT
Start: 2019-11-22 | End: 2019-11-22

## 2019-11-22 RX ORDER — ALPRAZOLAM 0.25 MG/1
0.25 TABLET ORAL ONCE
Status: COMPLETED | OUTPATIENT
Start: 2019-11-22 | End: 2019-11-22

## 2019-11-22 RX ADMIN — TETANUS TOXOID, REDUCED DIPHTHERIA TOXOID AND ACELLULAR PERTUSSIS VACCINE, ADSORBED 0.5 ML: 5; 2.5; 8; 8; 2.5 SUSPENSION INTRAMUSCULAR at 17:26

## 2019-11-22 RX ADMIN — ALPRAZOLAM 0.25 MG: 0.25 TABLET ORAL at 17:43

## 2019-11-22 RX ADMIN — LIDOCAINE HYDROCHLORIDE 20 ML: 10 INJECTION, SOLUTION INFILTRATION; PERINEURAL at 17:28

## 2019-11-22 ASSESSMENT — PAIN SCALES - GENERAL
PAINLEVEL_OUTOF10: 2
PAINLEVEL_OUTOF10: 2

## 2020-06-28 ENCOUNTER — HOSPITAL ENCOUNTER (EMERGENCY)
Age: 75
Discharge: HOME | End: 2020-06-28
Attending: EMERGENCY MEDICINE
Payer: MEDICARE

## 2020-06-28 ENCOUNTER — APPOINTMENT (OUTPATIENT)
Dept: CT IMAGING | Age: 75
End: 2020-06-28
Payer: MEDICARE

## 2020-06-28 VITALS
RESPIRATION RATE: 16 BRPM | HEART RATE: 98 BPM | DIASTOLIC BLOOD PRESSURE: 71 MMHG | OXYGEN SATURATION: 96 % | WEIGHT: 118 LBS | SYSTOLIC BLOOD PRESSURE: 126 MMHG | HEIGHT: 60 IN | TEMPERATURE: 98.1 F | BODY MASS INDEX: 23.16 KG/M2

## 2020-06-28 PROCEDURE — 72125 CT NECK SPINE W/O DYE: CPT

## 2020-06-28 PROCEDURE — 70486 CT MAXILLOFACIAL W/O DYE: CPT

## 2020-06-28 PROCEDURE — 99284 EMERGENCY DEPT VISIT MOD MDM: CPT

## 2020-06-28 PROCEDURE — 12001 RPR S/N/AX/GEN/TRNK 2.5CM/<: CPT

## 2020-06-28 PROCEDURE — 70450 CT HEAD/BRAIN W/O DYE: CPT

## 2020-06-28 RX ORDER — LIDOCAINE HYDROCHLORIDE 10 MG/ML
10 INJECTION, SOLUTION INFILTRATION; PERINEURAL ONCE
Status: DISCONTINUED | OUTPATIENT
Start: 2020-06-28 | End: 2020-06-28 | Stop reason: HOSPADM

## 2020-06-28 RX ORDER — AMOXICILLIN AND CLAVULANATE POTASSIUM 875; 125 MG/1; MG/1
1 TABLET, FILM COATED ORAL 2 TIMES DAILY
Qty: 20 TABLET | Refills: 0 | Status: SHIPPED | OUTPATIENT
Start: 2020-06-28 | End: 2020-07-08

## 2020-06-28 ASSESSMENT — ENCOUNTER SYMPTOMS
ABDOMINAL PAIN: 0
COLOR CHANGE: 1
VOMITING: 0

## 2020-06-28 ASSESSMENT — VISUAL ACUITY: OU: 1

## 2020-08-10 ENCOUNTER — APPOINTMENT (OUTPATIENT)
Dept: GENERAL RADIOLOGY | Age: 75
End: 2020-08-10
Payer: MEDICARE

## 2020-08-10 ENCOUNTER — HOSPITAL ENCOUNTER (EMERGENCY)
Age: 75
Discharge: HOME | End: 2020-08-10
Attending: EMERGENCY MEDICINE
Payer: MEDICARE

## 2020-08-10 ENCOUNTER — APPOINTMENT (OUTPATIENT)
Dept: CT IMAGING | Age: 75
End: 2020-08-10
Payer: MEDICARE

## 2020-08-10 VITALS
HEIGHT: 60 IN | WEIGHT: 118 LBS | SYSTOLIC BLOOD PRESSURE: 79 MMHG | TEMPERATURE: 98.2 F | OXYGEN SATURATION: 94 % | HEART RATE: 99 BPM | BODY MASS INDEX: 23.16 KG/M2 | RESPIRATION RATE: 24 BRPM | DIASTOLIC BLOOD PRESSURE: 55 MMHG

## 2020-08-10 PROCEDURE — 2500000003 HC RX 250 WO HCPCS: Performed by: EMERGENCY MEDICINE

## 2020-08-10 PROCEDURE — 73130 X-RAY EXAM OF HAND: CPT

## 2020-08-10 PROCEDURE — 99284 EMERGENCY DEPT VISIT MOD MDM: CPT

## 2020-08-10 PROCEDURE — 12001 RPR S/N/AX/GEN/TRNK 2.5CM/<: CPT

## 2020-08-10 PROCEDURE — 12013 RPR F/E/E/N/L/M 2.6-5.0 CM: CPT

## 2020-08-10 PROCEDURE — 70450 CT HEAD/BRAIN W/O DYE: CPT

## 2020-08-10 RX ORDER — LIDOCAINE HYDROCHLORIDE AND EPINEPHRINE 10; 10 MG/ML; UG/ML
20 INJECTION, SOLUTION INFILTRATION; PERINEURAL ONCE
Status: COMPLETED | OUTPATIENT
Start: 2020-08-10 | End: 2020-08-10

## 2020-08-10 RX ADMIN — LIDOCAINE HYDROCHLORIDE,EPINEPHRINE BITARTRATE 20 ML: 10; .01 INJECTION, SOLUTION INFILTRATION; PERINEURAL at 19:06

## 2020-08-10 ASSESSMENT — ENCOUNTER SYMPTOMS
DIARRHEA: 0
EYE DISCHARGE: 0
SORE THROAT: 0
RHINORRHEA: 0
COLOR CHANGE: 0
SHORTNESS OF BREATH: 0
VOMITING: 0
EYE REDNESS: 0
COUGH: 0
NAUSEA: 0

## 2020-08-10 ASSESSMENT — PAIN DESCRIPTION - DESCRIPTORS: DESCRIPTORS: HEADACHE;THROBBING

## 2020-08-10 ASSESSMENT — PAIN SCALES - GENERAL: PAINLEVEL_OUTOF10: 8

## 2020-08-10 ASSESSMENT — PAIN DESCRIPTION - LOCATION: LOCATION: HEAD

## 2020-08-10 ASSESSMENT — PAIN DESCRIPTION - FREQUENCY: FREQUENCY: CONTINUOUS

## 2020-08-10 NOTE — ED PROVIDER NOTES
EMERGENCY DEPARTMENT ENCOUNTER    Pt Name: Paola Parker  MRN: [id number]  [place] 1945  Date of evaluation: 8/10/20  CHIEF COMPLAINT       Chief Complaint   Patient presents with    Fall    Hand Laceration    Head Injury     HISTORY OF PRESENT ILLNESS   43-year-old female presents with complaints of a fall and small facial lacerations and a right hand laceration. The patient has a history of Parkinson's disease. She states that she lost her balance, tripped and fell. She believes she caught her hand on the drawer. Patient denies any loss of consciousness, she has no neck pain, she denies any chest or back pain. REVIEW OF SYSTEMS     Review of Systems   Constitutional: Negative for chills and fever. HENT: Negative for rhinorrhea and sore throat. Eyes: Negative for discharge, redness and visual disturbance. Respiratory: Negative for cough and shortness of breath. Cardiovascular: Negative for chest pain, palpitations and leg swelling. Gastrointestinal: Negative for diarrhea, nausea and vomiting. Musculoskeletal: Negative for arthralgias, myalgias and neck pain. Skin: Positive for wound. Negative for color change and rash. Neurological: Negative for seizures, weakness and headaches. Psychiatric/Behavioral: Negative for hallucinations, self-injury and suicidal ideas.      PASTMEDICAL HISTORY     Past Medical History:   Diagnosis Date    Cancer [facility])     breast    Degenerative joint disease (DJD) of lumbar spine     Hearing loss     Hypertension     Osteoporosis, senile     Parkinson's disease ([address]     Parkinson's disease ([address]     Tremor      Past Problem List  Patient Active Problem List   Diagnosis Code    Parkinson's disease ([facility] G20    Spondylosis of lumbar region without myelopathy or radiculopathy M47.816    Acute cystitis N30.00     SURGICAL HISTORY       Past Surgical History:   Procedure Laterality Date    BREAST LUMPECTOMY Right     COLONOSCOPY      MASTECTOMY Right 2016    simple on 2016 - and again 10/13/2016    OTHER SURGICAL HISTORY      Radio frequency lesioning for low back pain    OTHER SURGICAL HISTORY  2016    growth removed from left side of neck    SIGMOIDOSCOPY      TONSILLECTOMY       CURRENT MEDICATIONS       Current Discharge Medication List      CONTINUE these medications which have NOT CHANGED    Details   Carbidopa-Levodopa ER (RYTARY) 23.75-95 MG CPCR Take by mouth 4 times daily And takes 195 total of 6 times daily      Denosumab (PROLIA SC) Inject into the skin Indications: once every 6 months      baclofen (LIORESAL) 10 MG tablet Take 10 mg by mouth 3 times daily      diazepam (VALIUM) 5 MG tablet Take 1 tablet by mouth as needed      traZODone (DESYREL) 50 MG tablet Take 1 tablet by mouth 3 times daily       escitalopram (LEXAPRO) 10 MG tablet Take 1 tablet by mouth daily      ALPRAZolam (XANAX) 0.25 MG tablet Take 0.25 mg by mouth 3 times daily      Calcium Carb-Cholecalciferol (CALCIUM + D3) 600-200 MG-UNIT TABS Take 1 tablet by mouth daily      Multiple Vitamins-Calcium (ONE-A-DAY WOMENS PO) Take 1 tablet by mouth daily           ALLERGIES     has No Known Allergies. FAMILY HISTORY     She indicated that her mother is . She indicated that her father is . She indicated that the status of her brother is unknown. SOCIAL HISTORY       Social History     Tobacco Use    Smoking status: Former Smoker     Last attempt to quit: 1996     Years since quittin.6    Smokeless tobacco: Never Used   Substance Use Topics    Alcohol use: No     Alcohol/week: 0.0 standard drinks    Drug use: No     PHYSICAL EXAM     INITIAL VITALS: BP (!) 79/55   Pulse 99   Temp 98.2 °F (36.8 °C) (Oral)   Resp 24   Ht 5' (1.524 m)   Wt 118 lb (53.5 kg)   SpO2 94%   BMI 23.05 kg/m²    Physical Exam  Constitutional:       Appearance: Normal appearance. HENT:      Head: Normocephalic.  Abrasion, contusion and laceration present. Eyes:      Extraocular Movements: Extraocular movements intact. Pupils: Pupils are equal, round, and reactive to light. Cardiovascular:      Rate and Rhythm: Normal rate and regular rhythm. Pulmonary:      Effort: Pulmonary effort is normal.      Breath sounds: Normal breath sounds. Abdominal:      General: Abdomen is flat. Palpations: Abdomen is soft. Tenderness: There is no abdominal tenderness. Musculoskeletal:      Right hand: She exhibits laceration. Neurological:      Mental Status: She is alert. MEDICAL DECISION MAKIN-year-old female presents with complaints of laceration and facial injury. Plan is CT of the head, x-rays of the hand and we will repair the lacerations. CRITICAL CARE:       PROCEDURES:    Lac Repair    Date/Time: 8/10/2020 8:35 PM  Performed by: Freada Hodgkin, MD  Authorized by: Freada Hodgkin, MD     Consent:     Consent obtained:  Verbal    Consent given by:  Patient    Risks discussed:  Infection, need for additional repair, poor cosmetic result and poor wound healing    Alternatives discussed:  No treatment  Anesthesia (see MAR for exact dosages):      Anesthesia method:  Local infiltration    Local anesthetic:  Lidocaine 1% WITH epi  Laceration details:     Location:  Hand    Hand location:  R palm    Length (cm):  1    Depth (mm):  4  Repair type:     Repair type:  Simple  Pre-procedure details:     Preparation:  Patient was prepped and draped in usual sterile fashion  Exploration:     Hemostasis achieved with:  Epinephrine and direct pressure    Wound exploration: wound explored through full range of motion and entire depth of wound probed and visualized      Wound extent: vascular damage      Contaminated: no    Treatment:     Area cleansed with:  Saline    Amount of cleaning:  Standard    Visualized foreign bodies/material removed: no    Skin repair:     Repair method:  Sutures    Suture size:  4-0    Suture material:  Nylon    Suture technique:  Simple interrupted    Number of sutures:  4  Approximation:     Approximation:  Close  Post-procedure details:     Dressing:  Sterile dressing    Patient tolerance of procedure: Tolerated well, no immediate complications  Lac Repair    Date/Time: 8/10/2020 8:36 PM  Performed by: Loren Spicer MD  Authorized by: Loren Spicer MD     Consent:     Consent obtained:  Verbal    Consent given by:  Patient    Risks discussed:  Infection, poor cosmetic result and pain    Alternatives discussed:  No treatment  Anesthesia (see MAR for exact dosages): Anesthesia method:  Local infiltration    Local anesthetic:  Lidocaine 1% WITH epi  Laceration details:     Location:  Face    Face location:  Forehead    Length (cm):  3    Depth (mm):  3  Repair type:     Repair type:  Simple  Pre-procedure details:     Preparation:  Patient was prepped and draped in usual sterile fashion  Exploration:     Hemostasis achieved with:  Direct pressure  Treatment:     Area cleansed with:  Saline    Amount of cleaning:  Standard  Skin repair:     Repair method:  Sutures    Suture size:  6-0    Suture material:  Nylon    Suture technique:  Simple interrupted    Number of sutures:  5  Approximation:     Approximation:  Close  Post-procedure details:     Dressing:  Non-adherent dressing        DIAGNOSTIC RESULTS   EKG:All EKG's are interpreted by the Emergency Department Physician who either signs or Co-signs this chart in the absence of a cardiologist.        RADIOLOGY:All plain film, CT, MRI, and formal ultrasound images (except ED bedside ultrasound) are read by the radiologist, see reports below, unless otherwisenoted in MDM or here. CT HEAD WO CONTRAST   Final Result   Technically limited exam without acute intracranial abnormality.          XR HAND RIGHT (MIN 3 VIEWS)   Final Result   No definite acute osseous abnormality with chronic findings as described   possibly relating to rheumatoid arthritis. LABS: All lab results were reviewed by myself, and all abnormals are listed below. Labs Reviewed - No data to display    EMERGENCY DEPARTMENTCOURSE:         Vitals:    Vitals:    08/10/20 1834   BP: (!) 79/55   Pulse: 99   Resp: 24   Temp: 98.2 °F (36.8 °C)   TempSrc: Oral   SpO2: 94%   Weight: 118 lb (53.5 kg)   Height: 5' (1.524 m)       The patient was given the following medications while in the emergency department:  Orders Placed This Encounter   Medications    lidocaine-EPINEPHrine 1 percent-[id number] injection 20 mL     CONSULTS:  None    FINAL IMPRESSION      1. Injury of head, initial encounter    2. Facial laceration, initial encounter    3.  Laceration of right hand without foreign body, initial encounter          DISPOSITION/PLAN   DISPOSITION Decision To Discharge 08/10/2020 08:32:22 PM      PATIENT REFERRED TO:  Iesha Arora MD  [address] [place] [address]  [phone]    Schedule an appointment as soon as possible for a visit in 2 days      DISCHARGE MEDICATIONS:  Current Discharge Medication List        Hal Mendoza MD  Attending Emergency Physician                    Hal Mendoza MD  08/10/20 2037

## 2020-08-12 ENCOUNTER — HOSPITAL ENCOUNTER (EMERGENCY)
Age: 75
Discharge: HOME | End: 2020-08-12
Attending: EMERGENCY MEDICINE
Payer: MEDICARE

## 2020-08-12 ENCOUNTER — APPOINTMENT (OUTPATIENT)
Dept: GENERAL RADIOLOGY | Age: 75
End: 2020-08-12
Payer: MEDICARE

## 2020-08-12 VITALS
BODY MASS INDEX: 23.16 KG/M2 | HEART RATE: 100 BPM | TEMPERATURE: 98.6 F | OXYGEN SATURATION: 96 % | HEIGHT: 60 IN | WEIGHT: 118 LBS | RESPIRATION RATE: 22 BRPM

## 2020-08-12 PROCEDURE — 99284 EMERGENCY DEPT VISIT MOD MDM: CPT

## 2020-08-12 PROCEDURE — 73130 X-RAY EXAM OF HAND: CPT

## 2020-08-13 ENCOUNTER — APPOINTMENT (OUTPATIENT)
Dept: GENERAL RADIOLOGY | Age: 75
End: 2020-08-13
Payer: MEDICARE

## 2020-08-13 ENCOUNTER — HOSPITAL ENCOUNTER (EMERGENCY)
Age: 75
Discharge: HOME | End: 2020-08-13
Attending: EMERGENCY MEDICINE
Payer: MEDICARE

## 2020-08-13 VITALS
HEIGHT: 60 IN | RESPIRATION RATE: 18 BRPM | OXYGEN SATURATION: 100 % | HEART RATE: 78 BPM | BODY MASS INDEX: 23.16 KG/M2 | WEIGHT: 118 LBS

## 2020-08-13 PROCEDURE — 96372 THER/PROPH/DIAG INJ SC/IM: CPT

## 2020-08-13 PROCEDURE — 6360000002 HC RX W HCPCS: Performed by: EMERGENCY MEDICINE

## 2020-08-13 PROCEDURE — 99283 EMERGENCY DEPT VISIT LOW MDM: CPT

## 2020-08-13 PROCEDURE — 73030 X-RAY EXAM OF SHOULDER: CPT

## 2020-08-13 RX ORDER — LORAZEPAM 2 MG/ML
0.5 INJECTION INTRAMUSCULAR ONCE
Status: COMPLETED | OUTPATIENT
Start: 2020-08-13 | End: 2020-08-13

## 2020-08-13 RX ORDER — MORPHINE SULFATE 4 MG/ML
6 INJECTION, SOLUTION INTRAMUSCULAR; INTRAVENOUS ONCE
Status: COMPLETED | OUTPATIENT
Start: 2020-08-13 | End: 2020-08-13

## 2020-08-13 RX ADMIN — MORPHINE SULFATE 6 MG: 4 INJECTION, SOLUTION INTRAMUSCULAR; INTRAVENOUS at 18:05

## 2020-08-13 RX ADMIN — LORAZEPAM 0.5 MG: 2 INJECTION, SOLUTION INTRAMUSCULAR; INTRAVENOUS at 18:09

## 2020-08-13 ASSESSMENT — PAIN SCALES - GENERAL
PAINLEVEL_OUTOF10: 8
PAINLEVEL_OUTOF10: 8

## 2020-08-13 NOTE — ED PROVIDER NOTES
EMERGENCY DEPARTMENT ENCOUNTER    Pt Name: Nellie Ca  MRN: [id number]  [place] 1945  Date of evaluation: 8/12/20  CHIEF COMPLAINT       Chief Complaint   Patient presents with    Finger Pain     left pinky     HISTORY OF PRESENT ILLNESS   Patient is a 51-year-old female with Parkinson's, and hypertension who presents to the ED complaining of hand pain after fall at home earlier today. Patient tripped while using her walker. Patient fell forward with her hands out with her hands out. No head strike, no LOC. Patient has severe pain to left hand. No numbness or tingling. No fever, cough, chest pain, abdominal pain. She has healing ecchymosis over face from previous fall this week. REVIEW OF SYSTEMS     Review of Systems   All other systems reviewed and are negative.     PASTMEDICAL HISTORY     Past Medical History:   Diagnosis Date    Cancer [facility])     breast    Degenerative joint disease (DJD) of lumbar spine     Hearing loss     Hypertension     Osteoporosis, senile     Parkinson's disease ([address]     Parkinson's disease ([address]     Tremor      SURGICAL HISTORY       Past Surgical History:   Procedure Laterality Date    BREAST LUMPECTOMY Right     COLONOSCOPY      MASTECTOMY Right 08/23/2016    simple on 8/23/2016 - and again 10/13/2016    OTHER SURGICAL HISTORY      Radio frequency lesioning for low back pain    OTHER SURGICAL HISTORY  12/13/2016    growth removed from left side of neck    SIGMOIDOSCOPY      TONSILLECTOMY       CURRENT MEDICATIONS       Discharge Medication List as of 8/12/2020 10:08 PM      CONTINUE these medications which have NOT CHANGED    Details   Carbidopa-Levodopa ER (RYTARY) 23.75-95 MG CPCR Take by mouth 4 times daily And takes 195 total of 6 times dailyHistorical Med      Denosumab (PROLIA SC) Inject into the skin Indications: once every 6 monthsHistorical Med      diazepam (VALIUM) 5 MG tablet Take 1 tablet by mouth as neededHistorical Med      traZODone (DESYREL) 50 MG tablet Take 1 tablet by mouth 3 times daily Historical Med      escitalopram (LEXAPRO) 10 MG tablet Take 1 tablet by mouth dailyHistorical Med      ALPRAZolam (XANAX) 0.25 MG tablet Take 0.25 mg by mouth 3 times daily      Calcium Carb-Cholecalciferol (CALCIUM + D3) 600-200 MG-UNIT TABS Take 1 tablet by mouth daily      Multiple Vitamins-Calcium (ONE-A-DAY WOMENS PO) Take 1 tablet by mouth daily      baclofen (LIORESAL) 10 MG tablet Take 10 mg by mouth 3 times dailyHistorical Med           ALLERGIES     has No Known Allergies. FAMILY HISTORY     She indicated that her mother is . She indicated that her father is . She indicated that the status of her brother is unknown. SOCIAL HISTORY       Social History     Tobacco Use    Smoking status: Former Smoker     Last attempt to quit: 1996     Years since quittin.6    Smokeless tobacco: Never Used   Substance Use Topics    Alcohol use: No     Alcohol/week: 0.0 standard drinks    Drug use: No     PHYSICAL EXAM     INITIAL VITALS: Pulse 100   Temp 98.6 °F (37 °C)   Resp 22   Ht 5' (1.524 m)   Wt 118 lb (53.5 kg)   SpO2 96%   BMI 23.05 kg/m²    Physical Exam  Constitutional:       Appearance: Normal appearance. HENT:      Head: Normocephalic. Right Ear: External ear normal.      Left Ear: External ear normal.      Mouth/Throat:      Mouth: Mucous membranes are moist.   Eyes:      Conjunctiva/sclera: Conjunctivae normal.   Neck:      Musculoskeletal: Normal range of motion. Cardiovascular:      Rate and Rhythm: Normal rate. Pulmonary:      Effort: Pulmonary effort is normal.   Abdominal:      General: Abdomen is flat. Musculoskeletal:      Comments: Tenderness, swelling lateral dorsal left hand. Skin:     General: Skin is dry. Comments: Healing facial ecchymosis from prior fall. Neurological:      Mental Status: She is alert and oriented to person, place, and time. Mental status is at baseline. Psychiatric:         Mood and Affect: Mood normal.         Behavior: Behavior normal.         MEDICAL DECISION MAKING:   The patient is hemodynamically stable, afebrile, nontoxic-appearing. Physical exam notable for tenderness swelling lateral dorsal left hand. Based on history and exam likely fracture, dislocation  ED plan for hand x-ray, reassess. DIAGNOSTIC RESULTS   EKG:All EKG's are interpreted by the Emergency Department Physician who either signs or Co-signs this chart in the absence of a cardiologist.        RADIOLOGY:All plain film, CT, MRI, and formal ultrasound images (except ED bedside ultrasound) are read by the radiologist, see reports below, unless otherwisenoted in MDM or here. XR HAND LEFT (MIN 3 VIEWS)   Final Result   Acute 5th metacarpal fracture. Degenerative osseous changes throughout the left hand and wrist.      Osteopenia. LABS: All lab results were reviewed by myself, and all abnormals are listed below. Labs Reviewed - No data to display    EMERGENCY DEPARTMENTCOURSE:   Patient did well in the ED. X-ray shows displaced fracture fifth metacarpal bone left hand. Patient's hand placed in splint by nurse, evaluated by me and seems appropriate. Patient neurovascular intact post splint placement. Patient will follow-up with orthopedic surgery. No further work-up indicated this time. Vitals:    Vitals:    08/12/20 2016 08/12/20 2021   Pulse:  100   Resp:  22   Temp:  98.6 °F (37 °C)   SpO2:  96%   Weight: 118 lb (53.5 kg)    Height: 5' (1.524 m)        The patient was given the following medications while in the emergency department:  No orders of the defined types were placed in this encounter. CONSULTS:  None    FINAL IMPRESSION      1.  Closed displaced fracture of base of fifth metacarpal bone of left hand, initial encounter          DISPOSITION/PLAN   DISPOSITION Decision To Discharge 08/12/2020 10:32:39 PM      PATIENT REFERRED TO:  Freda Garcia

## 2020-08-13 NOTE — ED PROVIDER NOTES
EMERGENCY DEPARTMENT ENCOUNTER    Pt Name: Armen Clarke  MRN: [id number]  [place] 1945  Date of evaluation: 8/13/20  CHIEF COMPLAINT       Chief Complaint   Patient presents with    Shoulder Pain     bilateral     HISTORY OF PRESENT ILLNESS   80-year-old female presents emergency room for bilateral shoulder pain but is mostly concerned about the left shoulder. Patient has history of Parkinson's disease and has a lot of dyskinesia and significant tremors. Patient was reaching underneath the bed earlier today looking for something and states the pain started after that. She not have any significant injury to the joint.  states that she has recurrent issues with falls due to her movement disorder. She did fall the other day and was seen here and given a brace for her hand with 1/5 metacarpal fracture. She has some bruising to the face which has been states she was seen here a few days ago for. REVIEW OF SYSTEMS     Review of Systems   Musculoskeletal: Positive for arthralgias and myalgias.        PASTMEDICAL HISTORY     Past Medical History:   Diagnosis Date    Cancer [facility])     breast    Degenerative joint disease (DJD) of lumbar spine     Hearing loss     Hypertension     Osteoporosis, senile     Parkinson's disease ([address]     Parkinson's disease ([address]     Tremor      Past Problem List  Patient Active Problem List   Diagnosis Code    Parkinson's disease ([address] G20    Spondylosis of lumbar region without myelopathy or radiculopathy M47.816    Acute cystitis N30.00     SURGICAL HISTORY       Past Surgical History:   Procedure Laterality Date    BREAST LUMPECTOMY Right     COLONOSCOPY      MASTECTOMY Right 08/23/2016    simple on 8/23/2016 - and again 10/13/2016    OTHER SURGICAL HISTORY      Radio frequency lesioning for low back pain    OTHER SURGICAL HISTORY  12/13/2016    growth removed from left side of neck    SIGMOIDOSCOPY      TONSILLECTOMY       CURRENT MEDICATIONS Current Discharge Medication List      CONTINUE these medications which have NOT CHANGED    Details   Carbidopa-Levodopa ER (RYTARY) 23.75-95 MG CPCR Take by mouth 4 times daily And takes 195 total of 6 times daily      Denosumab (PROLIA SC) Inject into the skin Indications: once every 6 months      baclofen (LIORESAL) 10 MG tablet Take 10 mg by mouth 3 times daily      diazepam (VALIUM) 5 MG tablet Take 1 tablet by mouth as needed      traZODone (DESYREL) 50 MG tablet Take 1 tablet by mouth 3 times daily       escitalopram (LEXAPRO) 10 MG tablet Take 1 tablet by mouth daily      ALPRAZolam (XANAX) 0.25 MG tablet Take 0.25 mg by mouth 3 times daily      Calcium Carb-Cholecalciferol (CALCIUM + D3) 600-200 MG-UNIT TABS Take 1 tablet by mouth daily      Multiple Vitamins-Calcium (ONE-A-DAY WOMENS PO) Take 1 tablet by mouth daily           ALLERGIES     has No Known Allergies. FAMILY HISTORY     She indicated that her mother is . She indicated that her father is . She indicated that the status of her brother is unknown. SOCIAL HISTORY       Social History     Tobacco Use    Smoking status: Former Smoker     Last attempt to quit: 1996     Years since quittin.6    Smokeless tobacco: Never Used   Substance Use Topics    Alcohol use: No     Alcohol/week: 0.0 standard drinks    Drug use: No     PHYSICAL EXAM     INITIAL VITALS: Pulse 78   Resp 18   Ht 5' (1.524 m)   Wt 118 lb (53.5 kg)   SpO2 100%   BMI 23.05 kg/m²    Physical Exam  Constitutional:       General: She is not in acute distress. Appearance: She is well-developed. Comments: Body tremors   HENT:      Head: Normocephalic. Eyes:      Pupils: Pupils are equal, round, and reactive to light. Cardiovascular:      Rate and Rhythm: Normal rate and regular rhythm. Heart sounds: Normal heart sounds. Pulmonary:      Effort: Pulmonary effort is normal. No respiratory distress.       Breath sounds: Normal breath sounds. Abdominal:      General: Bowel sounds are normal.      Palpations: Abdomen is soft. Tenderness: There is no abdominal tenderness. Musculoskeletal: Normal range of motion. Comments: Patient has a fair amount of swelling and bruising to the ulnar aspect of the left hand   Skin:     General: Skin is warm and dry. Neurological:      Mental Status: She is alert and oriented to person, place, and time. MEDICAL DECISION MAKIN-year-old female coming into the emergency room with generalized pain to the shoulders with significant increase in pain to the left shoulder. X-rays do not show dislocation or fracture. Radiologist comments on [facility] osteoarthritis. Patient was placed in a splint for the fifth metacarpal fracture diagnosed yesterday. Patient discharged with primary care follow-up. CRITICAL CARE:       PROCEDURES:    Procedures    DIAGNOSTIC RESULTS   EKG:All EKG's are interpreted by the Emergency Department Physician who either signs or Co-signs this chart in the absence of a cardiologist.        RADIOLOGY:All plain film, CT, MRI, and formal ultrasound images (except ED bedside ultrasound) are read by the radiologist, see reports below, unless otherwisenoted in MDM or here. XR SHOULDER LEFT (MIN 2 VIEWS)   Final Result   Mild AC joint osteoarthritis. No acute osseous abnormality. Probable old healed left rib fractures. Correlate with CT chest or rib   series as metastatic disease cannot be excluded. Follow-up imaging recommended if pain persists or worsens following   conservative management. LABS: All lab results were reviewed by myself, and all abnormals are listed below.   Labs Reviewed - No data to display    EMERGENCY DEPARTMENTCOURSE:         Vitals:    Vitals:    20 1713   Pulse: 78   Resp: 18   SpO2: 100%   Weight: 118 lb (53.5 kg)   Height: 5' (1.524 m)       The patient was given the following medications while in the emergency department:  Orders Placed This Encounter   Medications    morphine sulfate (PF) injection 6 mg    LORazepam (ATIVAN) injection 0.5 mg     CONSULTS:  None    FINAL IMPRESSION      1.  Acute pain of left shoulder          DISPOSITION/PLAN   DISPOSITION Discharge - Pending Orders Complete 08/13/2020 07:11:53 PM      PATIENT REFERRED TO:  Kameron De Souza MD  [address] [place] [address]  [phone]    Schedule an appointment as soon as possible for a visit in 1 week      DISCHARGE MEDICATIONS:  Current Discharge Medication List        Emy Velazco MD  Attending Emergency Physician                  Kary Negrete MD  08/13/20 3886

## 2020-11-18 ENCOUNTER — HOSPITAL ENCOUNTER (EMERGENCY)
Age: 75
Discharge: LEFT BEFORE DISCHARGE | End: 2020-11-18
Attending: EMERGENCY MEDICINE
Payer: MEDICARE

## 2020-11-18 VITALS
WEIGHT: 104 LBS | SYSTOLIC BLOOD PRESSURE: 111 MMHG | OXYGEN SATURATION: 98 % | RESPIRATION RATE: 20 BRPM | HEIGHT: 60 IN | DIASTOLIC BLOOD PRESSURE: 70 MMHG | BODY MASS INDEX: 20.42 KG/M2 | TEMPERATURE: 98.4 F | HEART RATE: 94 BPM

## 2020-11-18 PROCEDURE — 81003 URINALYSIS AUTO W/O SCOPE: CPT

## 2020-11-18 ASSESSMENT — PAIN SCALES - GENERAL: PAINLEVEL_OUTOF10: 10

## 2020-11-19 NOTE — ED NOTES
Pt left before being seen. States \"I don't want to wait anymore. \" Appears in no acute distress     Terryl Settler, RN  11/18/20 7061

## 2020-11-23 ENCOUNTER — HOSPITAL ENCOUNTER (EMERGENCY)
Age: 75
Discharge: HOME | End: 2020-11-23
Attending: EMERGENCY MEDICINE
Payer: MEDICARE

## 2020-11-23 ENCOUNTER — APPOINTMENT (OUTPATIENT)
Dept: GENERAL RADIOLOGY | Age: 75
End: 2020-11-23
Payer: MEDICARE

## 2020-11-23 VITALS
TEMPERATURE: 98.3 F | HEART RATE: 96 BPM | OXYGEN SATURATION: 97 % | BODY MASS INDEX: 21.01 KG/M2 | WEIGHT: 107 LBS | DIASTOLIC BLOOD PRESSURE: 71 MMHG | RESPIRATION RATE: 19 BRPM | HEIGHT: 60 IN | SYSTOLIC BLOOD PRESSURE: 117 MMHG

## 2020-11-23 PROCEDURE — 71045 X-RAY EXAM CHEST 1 VIEW: CPT

## 2020-11-23 PROCEDURE — 99283 EMERGENCY DEPT VISIT LOW MDM: CPT

## 2020-11-23 RX ORDER — NAPROXEN 500 MG/1
500 TABLET ORAL 2 TIMES DAILY WITH MEALS
Qty: 14 TABLET | Refills: 0 | Status: ON HOLD | OUTPATIENT
Start: 2020-11-23 | End: 2021-06-07 | Stop reason: ALTCHOICE

## 2020-11-23 RX ORDER — ACETAMINOPHEN AND CODEINE PHOSPHATE 300; 30 MG/1; MG/1
1-2 TABLET ORAL 3 TIMES DAILY PRN
Qty: 15 TABLET | Refills: 0 | Status: SHIPPED | OUTPATIENT
Start: 2020-11-23 | End: 2020-11-26

## 2020-11-23 ASSESSMENT — PAIN DESCRIPTION - LOCATION: LOCATION: BACK;RIB CAGE

## 2020-11-23 ASSESSMENT — PAIN DESCRIPTION - PAIN TYPE: TYPE: ACUTE PAIN

## 2020-11-23 ASSESSMENT — PAIN SCALES - GENERAL: PAINLEVEL_OUTOF10: 9

## 2020-11-23 ASSESSMENT — PAIN DESCRIPTION - ORIENTATION: ORIENTATION: LEFT

## 2020-11-23 NOTE — ED PROVIDER NOTES
[address] [place] [address] ED  [facility]      Pt Name: Luis Dela Cruz  MRN: [id number]  [place] 1945  Date ofevaluation: 11/23/2020  Provider: Patrick Sanchez PA-C    CHIEF COMPLAINT       Chief Complaint   Patient presents with    Back Pain    Rib Injury     denies injury         HISTORY OF PRESENT ILLNESS  (Location/Symptom, Timing/Onset, Context/Setting, Quality, Duration, Modifying Factors, Severity.)   Luis Dela Cruz is a 76 y.o. female who presents to the emergency department with   left rib pain. Pain has been present for months according to the patient on records in the 100. Questionable injury. Pain is definitely worse with movement relieved with rest.  Pain worse with palpation as well. No cough, fevers, chills, nausea vomiting. No exertional chest pain or diaphoresis. Pain described  AS MILD AND SORE, AND CONSTANT      Nursing Notes were reviewed. ALLERGIES     Patient has no known allergies.     CURRENT MEDICATIONS       Previous Medications    ALPRAZOLAM (XANAX) 0.25 MG TABLET    Take 0.25 mg by mouth 3 times daily    BACLOFEN (LIORESAL) 10 MG TABLET    Take 10 mg by mouth 3 times daily    CALCIUM CARB-CHOLECALCIFEROL (CALCIUM + D3) 600-200 MG-UNIT TABS    Take 1 tablet by mouth daily    CARBIDOPA-LEVODOPA ER (RYTARY) 23.75-95 MG CPCR    Take by mouth 4 times daily And takes 195 total of 6 times daily    DENOSUMAB (PROLIA SC)    Inject into the skin Indications: once every 6 months    DIAZEPAM (VALIUM) 5 MG TABLET    Take 1 tablet by mouth as needed    ESCITALOPRAM (LEXAPRO) 10 MG TABLET    Take 1 tablet by mouth daily    MULTIPLE VITAMINS-CALCIUM (ONE-A-DAY WOMENS PO)    Take 1 tablet by mouth daily    TRAZODONE (DESYREL) 50 MG TABLET    Take 1 tablet by mouth 3 times daily        PAST MEDICAL HISTORY         Diagnosis Date    Cancer [facility])     breast    Degenerative joint disease (DJD) of lumbar spine     Hearing loss     Hypertension     Osteoporosis, senile  [facility]     Parkinson's disease [facility]     Tremor        SURGICAL HISTORY           Procedure Laterality Date    BREAST LUMPECTOMY Right     COLONOSCOPY      MASTECTOMY Right 2016    simple on 2016 - and again 10/13/2016    OTHER SURGICAL HISTORY      Radio frequency lesioning for low back pain    OTHER SURGICAL HISTORY  2016    growth removed from left side of neck    SIGMOIDOSCOPY      TONSILLECTOMY           FAMILY HISTORY           Problem Relation Age of Onset    Stroke Mother     Heart Disease Mother     Heart Attack Father     Heart Disease Father     High Blood Pressure Father     High Blood Pressure Brother      Family Status   Relation Name Status    Mother      Father      Brother  (Not Specified)        SOCIAL HISTORY      reports that she quit smoking about 24 years ago. She has never used smokeless tobacco. She reports that she does not drink alcohol or use drugs. REVIEW OFSYSTEMS    (2-9 systems for level 4, 10 or more for level 5)   Review of Systems    Except as noted above the remainder of the review of systems was reviewed and negative. PHYSICAL EXAM    (up to 7 for level 4, 8 or more for level 5)     ED Triage Vitals [20 1137]   BP Temp Temp Source Pulse Resp SpO2 Height Weight   117/71 98.3 °F (36.8 °C) Oral 96 19 97 % 5' (1.524 m) 107 lb (48.5 kg)      Physical Exam  Constitutional:       Appearance: She is well-developed. HENT:      Head: Normocephalic and atraumatic. Neck:      Musculoskeletal: Normal range of motion and neck supple. Cardiovascular:      Rate and Rhythm: Normal rate and regular rhythm. Pulmonary:      Effort: Pulmonary effort is normal.      Breath sounds: Normal breath sounds. Chest:       Abdominal:      Palpations: Abdomen is soft. Musculoskeletal: Normal range of motion. Skin:     General: Skin is warm. Findings: No rash.    Neurological:      Mental Status: She is alert and oriented to person, place, and time. Psychiatric:         Behavior: Behavior normal.                 DIAGNOSTIC RESULTS     EKG: All EKG's are interpreted by the Emergency Department Physician who either signs or Co-signs this chart in the absence of a cardiologist.        RADIOLOGY:   Non-plain film images such as CT, Ultrasound and MRI are read by the radiologist. Plain radiographic images arevisualized and preliminarily interpreted by the emergency physician with the below findings:        Interpretation per the Radiologist below, if available at thetime of this note:          ED BEDSIDE ULTRASOUND:   Performed by ED Physician - none    LABS:  Labs Reviewed - No data to display    All other labs were within normal range or not returned as of this dictation. EMERGENCY DEPARTMENT COURSE and DIFFERENTIAL DIAGNOSIS/MDM:   Vitals:    Vitals:    11/23/20 1137   BP: 117/71   Pulse: 96   Resp: 19   Temp: 98.3 °F (36.8 °C)   TempSrc: Oral   SpO2: 97%   Weight: 107 lb (48.5 kg)   Height: 5' (1.524 m)     Minutes of previous rib fractures. It appears that these fractures are new from 1 year ago. Patient does not appear to be in any type of distress. Also denies sob and cough. Will treat for pain with Tylenol with codeine and Naprosyn and given incentive spirometer and discharge home. Incentive spirometer has been ordered here. Oxygen Is 97 percent on room air. CONSULTS:  None    PROCEDURES:  Procedures        FINAL IMPRESSION      1. Closed fracture of multiple ribs of left side, initial encounter          DISPOSITION/PLAN   DISPOSITION Decision To Discharge 11/23/2020 01:28:38 PM      PATIENTREFERRED TO:   Sami Johnson MD  [facility]  [phone]    In 3 days        DISCHARGE MEDICATIONS:     New Prescriptions    ACETAMINOPHEN-CODEINE (TYLENOL/CODEINE #3) 300-30 MG PER TABLET    Take 1-2 tablets by mouth 3 times daily as needed for Pain for up to 3 days.     NAPROXEN (NAPROSYN) 500 MG TABLET    Take 1 tablet by mouth 2 times daily (with meals) for 7 days           (Please note that portions of this note were completed with a voice recognition program.  Efforts were made to edit thedictations but occasionally words are mis-transcribed.)    VERO Powers PA-C  11/23/20 6695

## 2020-11-23 NOTE — ED PROVIDER NOTES
simple on 2016 - and again 10/13/2016    OTHER SURGICAL HISTORY      Radio frequency lesioning for low back pain    OTHER SURGICAL HISTORY  2016    growth removed from left side of neck    SIGMOIDOSCOPY      TONSILLECTOMY       CURRENT MEDICATIONS       Discharge Medication List as of 2020  1:30 PM      CONTINUE these medications which have NOT CHANGED    Details   Carbidopa-Levodopa ER (RYTARY) 23.75-95 MG CPCR Take by mouth 4 times daily And takes 195 total of 6 times dailyHistorical Med      Denosumab (PROLIA SC) Inject into the skin Indications: once every 6 monthsHistorical Med      baclofen (LIORESAL) 10 MG tablet Take 10 mg by mouth 3 times dailyHistorical Med      diazepam (VALIUM) 5 MG tablet Take 1 tablet by mouth as neededHistorical Med      traZODone (DESYREL) 50 MG tablet Take 1 tablet by mouth 3 times daily Historical Med      escitalopram (LEXAPRO) 10 MG tablet Take 1 tablet by mouth dailyHistorical Med      ALPRAZolam (XANAX) 0.25 MG tablet Take 0.25 mg by mouth 3 times daily      Calcium Carb-Cholecalciferol (CALCIUM + D3) 600-200 MG-UNIT TABS Take 1 tablet by mouth daily      Multiple Vitamins-Calcium (ONE-A-DAY WOMENS PO) Take 1 tablet by mouth daily           ALLERGIES     has No Known Allergies. FAMILY HISTORY     She indicated that her mother is . She indicated that her father is . She indicated that the status of her brother is unknown. SOCIAL HISTORY       Social History     Tobacco Use    Smoking status: Former Smoker     Last attempt to quit: 1996     Years since quittin.9    Smokeless tobacco: Never Used   Substance Use Topics    Alcohol use: No     Alcohol/week: 0.0 standard drinks    Drug use: No       I personally evaluated and examined the patient in conjunction with the APC and agree with the assessment, treatment plan, and disposition of the patient as recorded by the APC.    Carlos Monahan MD  Attending Emergency Physician

## 2020-11-23 NOTE — ED NOTES
Patient presented to the ED with complaints of left back/rib pain. Pt. Denies any falls or broken ribs. She rates her pain a 10. She stated pains been there for a couple of days and has progressively got worse.       Daina Mercado RN  11/23/20 3736

## 2021-01-27 ENCOUNTER — HOSPITAL ENCOUNTER (EMERGENCY)
Age: 76
Discharge: HOME | End: 2021-01-27
Attending: EMERGENCY MEDICINE
Payer: MEDICARE

## 2021-01-27 VITALS
BODY MASS INDEX: 20.22 KG/M2 | TEMPERATURE: 97.6 F | WEIGHT: 103 LBS | HEART RATE: 86 BPM | DIASTOLIC BLOOD PRESSURE: 100 MMHG | OXYGEN SATURATION: 100 % | SYSTOLIC BLOOD PRESSURE: 159 MMHG | HEIGHT: 60 IN | RESPIRATION RATE: 18 BRPM

## 2021-01-27 DIAGNOSIS — S01.81XA FACIAL LACERATION, INITIAL ENCOUNTER: Primary | ICD-10-CM

## 2021-01-27 PROCEDURE — 99283 EMERGENCY DEPT VISIT LOW MDM: CPT

## 2021-01-27 PROCEDURE — 12011 RPR F/E/E/N/L/M 2.5 CM/<: CPT

## 2021-01-27 RX ORDER — LIDOCAINE HYDROCHLORIDE 10 MG/ML
5 INJECTION, SOLUTION INFILTRATION; PERINEURAL ONCE
Status: DISCONTINUED | OUTPATIENT
Start: 2021-01-27 | End: 2021-01-27 | Stop reason: HOSPADM

## 2021-01-29 ASSESSMENT — ENCOUNTER SYMPTOMS
APNEA: 0
VOMITING: 0
DIARRHEA: 0
CONSTIPATION: 0
SHORTNESS OF BREATH: 0
EYES NEGATIVE: 1
SINUS PAIN: 0
CHEST TIGHTNESS: 0
ABDOMINAL DISTENTION: 0
COLOR CHANGE: 0

## 2021-01-30 NOTE — ED PROVIDER NOTES
EMERGENCY DEPARTMENT ENCOUNTER    Pt Name: Lesly Sylvester  MRN: [id number]  [place] 1945  Date of evaluation: 1/29/21  CHIEF COMPLAINT       Chief Complaint   Patient presents with    Laceration     HISTORY OF PRESENT ILLNESS   42-year-old female presents the emergency room after a fall. Patient has a laceration to the right eyebrow. Patient states that she was getting up to go to the bathroom in the night. Patient has history of Parkinson's disease and has suffered from recurrent falls due to her disease process. She did not get dizzy or weak prior to the fall. This is a recurring issue related to her Parkinson's. Patient denied loss of consciousness. She has some pain to the laceration but no headache or neck pain. Patient denies any other injuries or complaints here today. REVIEW OF SYSTEMS     Review of Systems   Constitutional: Negative for activity change, chills and diaphoresis. HENT: Negative for congestion, sinus pain and tinnitus. Eyes: Negative. Respiratory: Negative for apnea, chest tightness and shortness of breath. Gastrointestinal: Negative for abdominal distention, constipation, diarrhea and vomiting. Genitourinary: Negative for difficulty urinating and frequency. Musculoskeletal: Negative for arthralgias and myalgias. Skin: Negative for color change and rash. Neurological: Positive for tremors and weakness. Negative for dizziness. Hematological: Negative. Psychiatric/Behavioral: Negative.         PASTMEDICAL HISTORY     Past Medical History:   Diagnosis Date    Cancer [facility])     breast    Degenerative joint disease (DJD) of lumbar spine     Hearing loss     Hypertension     Osteoporosis, senile     Parkinson's disease ([address]     Parkinson's disease ([address]     Tremor      Past Problem List  Patient Active Problem List   Diagnosis Code    Parkinson's disease ([address] G20    Spondylosis of lumbar region without myelopathy or radiculopathy M47.816  Acute cystitis N30.00     SURGICAL HISTORY       Past Surgical History:   Procedure Laterality Date    BREAST LUMPECTOMY Right     COLONOSCOPY      MASTECTOMY Right 2016    simple on 2016 - and again 10/13/2016    OTHER SURGICAL HISTORY      Radio frequency lesioning for low back pain    OTHER SURGICAL HISTORY  2016    growth removed from left side of neck    SIGMOIDOSCOPY      TONSILLECTOMY       CURRENT MEDICATIONS       Discharge Medication List as of 2021  5:59 AM      CONTINUE these medications which have NOT CHANGED    Details   Carbidopa-Levodopa ER (RYTARY) 23.75-95 MG CPCR Take by mouth 4 times daily And takes 195 total of 6 times dailyHistorical Med      Denosumab (PROLIA SC) Inject into the skin Indications: once every 6 monthsHistorical Med      baclofen (LIORESAL) 10 MG tablet Take 10 mg by mouth 3 times dailyHistorical Med      diazepam (VALIUM) 5 MG tablet Take 1 tablet by mouth as neededHistorical Med      traZODone (DESYREL) 50 MG tablet Take 1 tablet by mouth 3 times daily Historical Med      escitalopram (LEXAPRO) 10 MG tablet Take 1 tablet by mouth dailyHistorical Med      ALPRAZolam (XANAX) 0.25 MG tablet Take 0.25 mg by mouth 3 times daily      Calcium Carb-Cholecalciferol (CALCIUM + D3) 600-200 MG-UNIT TABS Take 1 tablet by mouth daily      Multiple Vitamins-Calcium (ONE-A-DAY WOMENS PO) Take 1 tablet by mouth daily      naproxen (NAPROSYN) 500 MG tablet Take 1 tablet by mouth 2 times daily (with meals) for 7 days, Disp-14 tablet,R-0Print           ALLERGIES     has No Known Allergies. FAMILY HISTORY     She indicated that her mother is . She indicated that her father is . She indicated that the status of her brother is unknown.      SOCIAL HISTORY       Social History     Tobacco Use    Smoking status: Former Smoker     Quit date: 1996     Years since quittin.0    Smokeless tobacco: Never Used   Substance Use Topics  Alcohol use: No     Alcohol/week: 0.0 standard drinks    Drug use: No     PHYSICAL EXAM     INITIAL VITALS: BP (!) 159/100   Pulse 86   Temp 97.6 °F (36.4 °C) (Oral)   Resp 18   Ht 5' (1.524 m)   Wt 103 lb (46.7 kg)   SpO2 100%   BMI 20.12 kg/m²    Physical Exam  Constitutional:       General: She is not in acute distress. Appearance: She is well-developed. HENT:      Head: Normocephalic. Comments: 2 cm laceration to right eyebrow, approx 1-2 mm depth  Bleeding controlled  No visualized foreign bodies  Eyes:      Pupils: Pupils are equal, round, and reactive to light. Cardiovascular:      Rate and Rhythm: Normal rate and regular rhythm. Heart sounds: Normal heart sounds. Pulmonary:      Effort: Pulmonary effort is normal. No respiratory distress. Breath sounds: Normal breath sounds. Abdominal:      General: Bowel sounds are normal.      Palpations: Abdomen is soft. Tenderness: There is no abdominal tenderness. Musculoskeletal: Normal range of motion. Skin:     General: Skin is warm and dry. Neurological:      Mental Status: She is alert and oriented to person, place, and time. MEDICAL DECISION MAKING:     Alert oriented 68-year-old female presenting to the emergency room with laceration to the right eyebrow after fall. Patient is here with her . She is alert oriented and no vomiting. Patient denies any loss of consciousness headache or neck pain. Laceration was repaired without complication. Patient discharged in the care of her  after repair. Patient and has been given information on wound care and suture removal follow-up.        CRITICAL CARE:       PROCEDURES:    Lac Repair    Date/Time: 1/29/2021 8:03 PM  Performed by: Tiffani Mujica MD  Authorized by: Tiffani Mujica MD     Consent:     Consent obtained:  Verbal    Consent given by:  Patient    Risks discussed:  Infection, poor cosmetic result, poor wound healing and pain Laceration details:     Location:  Face    Face location:  R eyebrow    Length (cm):  2    Depth (mm):  1  Repair type:     Repair type:  Simple  Pre-procedure details:     Preparation:  Patient was prepped and draped in usual sterile fashion  Treatment:     Area cleansed with:  Saline    Amount of cleaning:  Standard    Irrigation method:  Syringe    Visualized foreign bodies/material removed: no    Skin repair:     Repair method:  Sutures    Suture size:  5-0    Suture material:  Prolene    Suture technique:  Simple interrupted    Number of sutures:  4  Approximation:     Approximation:  Close        DIAGNOSTIC RESULTS   EKG:All EKG's are interpreted by the Emergency Department Physician who either signs or Co-signs this chart in the absence of a cardiologist.        RADIOLOGY:All plain film, CT, MRI, and formal ultrasound images (except ED bedside ultrasound) are read by the radiologist, see reports below, unless otherwisenoted in MDM or here. No orders to display     LABS: All lab results were reviewed by myself, and all abnormals are listed below. Labs Reviewed - No data to display    EMERGENCY DEPARTMENTCOURSE:         Vitals:    Vitals:    01/27/21 0503   BP: (!) 159/100   Pulse: 86   Resp: 18   Temp: 97.6 °F (36.4 °C)   TempSrc: Oral   SpO2: 100%   Weight: 103 lb (46.7 kg)   Height: 5' (1.524 m)       The patient was given the following medications while in the emergency department:  Orders Placed This Encounter   Medications    DISCONTD: lidocaine 1 % injection 5 mL     CONSULTS:  None    FINAL IMPRESSION      1.  Facial laceration, initial encounter          DISPOSITION/PLAN   DISPOSITION Decision To Discharge 01/27/2021 05:54:44 AM      PATIENT REFERRED TO:  Karla Hilario MD  [address] [place] [address]  [phone]      As needed    DISCHARGE MEDICATIONS:  Discharge Medication List as of 1/27/2021  5:59 AM        Rita Whiting MD  Attending Emergency Physician Timbo Gama MD  01/29/21 2007

## 2021-02-01 ENCOUNTER — APPOINTMENT (OUTPATIENT)
Dept: CT IMAGING | Age: 76
End: 2021-02-01
Payer: MEDICARE

## 2021-02-01 ENCOUNTER — APPOINTMENT (OUTPATIENT)
Dept: GENERAL RADIOLOGY | Age: 76
End: 2021-02-01
Payer: MEDICARE

## 2021-02-01 ENCOUNTER — HOSPITAL ENCOUNTER (EMERGENCY)
Age: 76
Discharge: HOME | End: 2021-02-01
Attending: EMERGENCY MEDICINE
Payer: MEDICARE

## 2021-02-01 VITALS
DIASTOLIC BLOOD PRESSURE: 73 MMHG | HEART RATE: 88 BPM | RESPIRATION RATE: 20 BRPM | BODY MASS INDEX: 20.22 KG/M2 | TEMPERATURE: 98.4 F | HEIGHT: 60 IN | OXYGEN SATURATION: 100 % | SYSTOLIC BLOOD PRESSURE: 136 MMHG | WEIGHT: 103 LBS

## 2021-02-01 DIAGNOSIS — S30.0XXA CONTUSION OF LOWER BACK, INITIAL ENCOUNTER: Primary | ICD-10-CM

## 2021-02-01 PROCEDURE — 96372 THER/PROPH/DIAG INJ SC/IM: CPT

## 2021-02-01 PROCEDURE — 6360000002 HC RX W HCPCS: Performed by: PHYSICIAN ASSISTANT

## 2021-02-01 PROCEDURE — 99283 EMERGENCY DEPT VISIT LOW MDM: CPT

## 2021-02-01 PROCEDURE — 6370000000 HC RX 637 (ALT 250 FOR IP): Performed by: PHYSICIAN ASSISTANT

## 2021-02-01 PROCEDURE — 73562 X-RAY EXAM OF KNEE 3: CPT

## 2021-02-01 PROCEDURE — 73700 CT LOWER EXTREMITY W/O DYE: CPT

## 2021-02-01 PROCEDURE — 72131 CT LUMBAR SPINE W/O DYE: CPT

## 2021-02-01 RX ORDER — OXYCODONE HYDROCHLORIDE AND ACETAMINOPHEN 5; 325 MG/1; MG/1
1 TABLET ORAL ONCE
Status: COMPLETED | OUTPATIENT
Start: 2021-02-01 | End: 2021-02-01

## 2021-02-01 RX ORDER — ONDANSETRON 4 MG/1
4 TABLET, ORALLY DISINTEGRATING ORAL ONCE
Status: COMPLETED | OUTPATIENT
Start: 2021-02-01 | End: 2021-02-01

## 2021-02-01 RX ORDER — MORPHINE SULFATE 4 MG/ML
4 INJECTION, SOLUTION INTRAMUSCULAR; INTRAVENOUS ONCE
Status: COMPLETED | OUTPATIENT
Start: 2021-02-01 | End: 2021-02-01

## 2021-02-01 RX ADMIN — MORPHINE SULFATE 4 MG: 4 INJECTION, SOLUTION INTRAMUSCULAR; INTRAVENOUS at 16:12

## 2021-02-01 RX ADMIN — ONDANSETRON 4 MG: 4 TABLET, ORALLY DISINTEGRATING ORAL at 16:11

## 2021-02-01 RX ADMIN — OXYCODONE AND ACETAMINOPHEN 1 TABLET: 5; 325 TABLET ORAL at 14:15

## 2021-02-01 ASSESSMENT — PAIN SCALES - GENERAL
PAINLEVEL_OUTOF10: 8
PAINLEVEL_OUTOF10: 0
PAINLEVEL_OUTOF10: 8

## 2021-02-01 NOTE — ED PROVIDER NOTES
[facility] ED  [facility]      Pt Name: Krishna Solares  MRN: [id number]  [place] 1945  Date ofevaluation: 2/1/2021  Provider: Alexia Lassiter Dr       Chief Complaint   Patient presents with   Domenic Hews Fall    Buttocks Pain         HISTORY OF PRESENT ILLNESS  (Location/Symptom, Timing/Onset, Context/Setting, Quality, Duration, Modifying Factors, Severity.)   Krishna Solares is a 76 y.o. female who presents to the emergency department with fall that occurred earlier today. Patient reports pain in her buttock and right hip and right knee. Patient has frequent falls. Pain described as moderate, sore, constant. Patient has been able to use walker ambulation since the fall that occurred recently. No nausea or vomiting. No LOC. Pain with movement relieved with rest.  No other complaints. Nursing Notes were reviewed. ALLERGIES     Patient has no known allergies.     CURRENT MEDICATIONS       Discharge Medication List as of 2/1/2021  4:23 PM      CONTINUE these medications which have NOT CHANGED    Details   naproxen (NAPROSYN) 500 MG tablet Take 1 tablet by mouth 2 times daily (with meals) for 7 days, Disp-14 tablet,R-0Print      Carbidopa-Levodopa ER (RYTARY) 23.75-95 MG CPCR Take by mouth 4 times daily And takes 195 total of 6 times dailyHistorical Med      Denosumab (PROLIA SC) Inject into the skin Indications: once every 6 monthsHistorical Med      baclofen (LIORESAL) 10 MG tablet Take 10 mg by mouth 3 times dailyHistorical Med      diazepam (VALIUM) 5 MG tablet Take 1 tablet by mouth as neededHistorical Med      traZODone (DESYREL) 50 MG tablet Take 1 tablet by mouth 3 times daily Historical Med      escitalopram (LEXAPRO) 10 MG tablet Take 1 tablet by mouth dailyHistorical Med      ALPRAZolam (XANAX) 0.25 MG tablet Take 0.25 mg by mouth 3 times daily      Calcium Carb-Cholecalciferol (CALCIUM + D3) 600-200 MG-UNIT TABS Take 1 tablet by mouth daily Multiple Vitamins-Calcium (ONE-A-DAY WOMENS PO) Take 1 tablet by mouth daily             PAST MEDICAL HISTORY         Diagnosis Date    Cancer [facility])     breast    Degenerative joint disease (DJD) of lumbar spine     Hearing loss     Hypertension     Osteoporosis, senile     Parkinson's disease ([address]     [facility]'s disease [address] [address]     Tremor        SURGICAL HISTORY           Procedure Laterality Date    BREAST LUMPECTOMY Right     COLONOSCOPY      MASTECTOMY Right 2016    simple on 2016 - and again 10/13/2016    OTHER SURGICAL HISTORY      Radio frequency lesioning for low back pain    OTHER SURGICAL HISTORY  2016    growth removed from left side of neck    SIGMOIDOSCOPY      TONSILLECTOMY           FAMILY HISTORY           Problem Relation Age of Onset    Stroke Mother     Heart Disease Mother     Heart Attack Father     Heart Disease Father     High Blood Pressure Father     High Blood Pressure Brother      Family Status   Relation Name Status    Mother      Father      Brother  (Not Specified)        SOCIAL HISTORY      reports that she quit smoking about 25 years ago. She has never used smokeless tobacco. She reports that she does not drink alcohol or use drugs. REVIEW OFSYSTEMS    (2-9 systems for level 4, 10 or more for level 5)   Review of Systems    Except as noted above the remainder of the review of systems was reviewed and negative. PHYSICAL EXAM    (up to 7 for level 4, 8 or more for level 5)     ED Triage Vitals [21 1351]   BP Temp Temp Source Pulse Resp SpO2 Height Weight   136/73 98.4 °F (36.9 °C) Oral 88 20 100 % 5' (1.524 m) 103 lb (46.7 kg)      Physical Exam  Constitutional:       Appearance: She is well-developed. HENT:      Head: Normocephalic and atraumatic. Neck:      Musculoskeletal: Normal range of motion and neck supple. Cardiovascular:      Rate and Rhythm: Normal rate and regular rhythm.    Pulmonary: (Please note that portions of this note were completed with a voice recognition program.  Efforts were made to edit thedictations but occasionally words are mis-transcribed.)    Juanell Closs, PA-C Juanell Closs, PA-C  02/01/21 Alberto Carmona PA-C  02/05/21 1243

## 2021-02-01 NOTE — ED PROVIDER NOTES
EMERGENCY DEPARTMENT ENCOUNTER   ATTENDING ATTESTATION     Pt Name: Hakeem Spence  MRN: [id number]  [place] 1945  Date of evaluation: 2/1/21   Hakeem Spence is a 76 y.o. female with CC: Fall and Buttocks Pain    MDM:   Patient is a 58-year-old female with history of Parkinson's here after a fall with a right buttock right low back right hip pain. Complaining of right knee pain. Not on anticoagulation. Here with her . She has frequent falls due to her movement disorder. She states the pain radiates down the back of her right buttock and right leg. No recent illness no fevers no cough no shortness of breath. No chest pain no palpitation. No bowel or bladder dysfunction or saddle anesthesia. On exam she does have some tenderness over the right lower back and right lower lumbar sacral area and right hip. She has full range of motion of the right hip and right leg. She is intact DP pulses distally. She has normal range of motion of bilateral lower extremities and sensation. No head injury from her fall. She did have a recent fall in visit about 5 days ago for laceration but did not hit her head this time. Impression is low back sacral hip pain. Will image, treat symptomatically. No signs or symptoms concerning for cord impingement. CRITICAL CARE:       EKG: All EKG's are interpreted by the Emergency Department Physician who either signs or Co-signs this chart in the absence of a cardiologist.      RADIOLOGY:All plain film, CT, MRI, and formal ultrasound images (except ED bedside ultrasound) are read by the radiologist, see reports below, unless otherwise noted in MDM or here. CT HIP RIGHT WO CONTRAST   Final Result   1. Mild sclerosis of the posterior sacrum and posterior sacrococcygeal   junction which could be related to nondisplaced sacral insufficiency   fracture. Consider further evaluation with MRI sacrum/coccyx to assess for   marrow edema. 2. Exam is somewhat limited due to patient motion and associated artifact in   the region of the pelvis on the reconstructions. 3. Mild right hip osteoarthrosis. 4. Moderate to severe degenerative changes in the lower lumbar/lumbosacral   spine. 5. Atherosclerotic calcification of the aorta and branch vasculature. CT LUMBAR SPINE WO CONTRAST   Final Result   No acute fracture or subluxation. Anterolisthesis of L4 upon L5 measuring approximately 6 mm appears unchanged. Chronic disc bulges at L2-3 and L5-S1. Severe disc height loss and vacuum   disc phenomenon at L5-S1. Severe neural foraminal narrowing at L5-S1   bilaterally. XR KNEE RIGHT (3 VIEWS)   Final Result   1. Stable probable enchondroma measuring 1.6 cm when compared with 06/23/2014.   2. Mild tricompartmental osteoarthrosis. No acute fracture or dislocation. 3. Atherosclerotic calcification of the vasculature. LABS: All lab results were reviewed by myself, and all abnormals are listed below. Labs Reviewed - No data to display  CONSULTS:  None  FINAL IMPRESSION    No diagnosis found.         PASTMEDICAL HISTORY     Past Medical History:   Diagnosis Date    Cancer [facility])     breast    Degenerative joint disease (DJD) of lumbar spine     Hearing loss     Hypertension     Osteoporosis, senile     Parkinson's disease ([address]     Parkinson's disease [facility] [address]     Tremor      SURGICAL HISTORY       Past Surgical History:   Procedure Laterality Date    BREAST LUMPECTOMY Right     COLONOSCOPY      MASTECTOMY Right 08/23/2016    simple on 8/23/2016 - and again 10/13/2016    OTHER SURGICAL HISTORY      Radio frequency lesioning for low back pain    OTHER SURGICAL HISTORY  12/13/2016    growth removed from left side of neck    SIGMOIDOSCOPY      TONSILLECTOMY       CURRENT MEDICATIONS       Previous Medications    ALPRAZOLAM (XANAX) 0.25 MG TABLET    Take 0.25 mg by mouth 3 times daily BACLOFEN (LIORESAL) 10 MG TABLET    Take 10 mg by mouth 3 times daily    CALCIUM CARB-CHOLECALCIFEROL (CALCIUM + D3) 600-200 MG-UNIT TABS    Take 1 tablet by mouth daily    CARBIDOPA-LEVODOPA ER (RYTARY) 23.75-95 MG CPCR    Take by mouth 4 times daily And takes 195 total of 6 times daily    DENOSUMAB (PROLIA SC)    Inject into the skin Indications: once every 6 months    DIAZEPAM (VALIUM) 5 MG TABLET    Take 1 tablet by mouth as needed    ESCITALOPRAM (LEXAPRO) 10 MG TABLET    Take 1 tablet by mouth daily    MULTIPLE VITAMINS-CALCIUM (ONE-A-DAY WOMENS PO)    Take 1 tablet by mouth daily    NAPROXEN (NAPROSYN) 500 MG TABLET    Take 1 tablet by mouth 2 times daily (with meals) for 7 days    TRAZODONE (DESYREL) 50 MG TABLET    Take 1 tablet by mouth 3 times daily      ALLERGIES     has No Known Allergies. FAMILY HISTORY     She indicated that her mother is . She indicated that her father is . She indicated that the status of her brother is unknown. SOCIAL HISTORY       Social History     Tobacco Use    Smoking status: Former Smoker     Quit date: 1996     Years since quittin.1    Smokeless tobacco: Never Used   Substance Use Topics    Alcohol use: No     Alcohol/week: 0.0 standard drinks    Drug use: No       I personally evaluated and examined the patient in conjunction with the APC and agree with the assessment, treatment plan, and disposition of the patient as recorded by the APC.    Kale Morillo MD  Attending Emergency Physician          Kale Morillo MD  [id number]

## 2021-03-16 ENCOUNTER — HOSPITAL ENCOUNTER (EMERGENCY)
Age: 76
Discharge: HOME | End: 2021-03-16
Attending: EMERGENCY MEDICINE
Payer: MEDICARE

## 2021-03-16 ENCOUNTER — APPOINTMENT (OUTPATIENT)
Dept: CT IMAGING | Age: 76
End: 2021-03-16
Payer: MEDICARE

## 2021-03-16 VITALS
HEART RATE: 96 BPM | RESPIRATION RATE: 18 BRPM | OXYGEN SATURATION: 99 % | BODY MASS INDEX: 20.31 KG/M2 | DIASTOLIC BLOOD PRESSURE: 94 MMHG | WEIGHT: 104 LBS | SYSTOLIC BLOOD PRESSURE: 184 MMHG | TEMPERATURE: 98.4 F

## 2021-03-16 DIAGNOSIS — L03.213 PERIORBITAL CELLULITIS OF RIGHT EYE: ICD-10-CM

## 2021-03-16 DIAGNOSIS — K04.7 DENTAL ABSCESS: Primary | ICD-10-CM

## 2021-03-16 LAB
-: NORMAL
ABSOLUTE EOS #: 0.16 K/UL (ref 0–0.44)
ABSOLUTE IMMATURE GRANULOCYTE: 0.05 K/UL (ref 0–0.3)
ABSOLUTE LYMPH #: 0.74 K/UL (ref 1.1–3.7)
ABSOLUTE MONO #: 0.83 K/UL (ref 0.1–1.2)
ANION GAP SERPL CALCULATED.3IONS-SCNC: 11 MMOL/L (ref 9–17)
BASOPHILS # BLD: 1 % (ref 0–2)
BASOPHILS ABSOLUTE: 0.07 K/UL (ref 0–0.2)
BUN BLDV-MCNC: 16 MG/DL (ref 8–23)
BUN/CREAT BLD: ABNORMAL (ref 9–20)
C-REACTIVE PROTEIN: 42.1 MG/L (ref 0–5)
CALCIUM SERPL-MCNC: 9.5 MG/DL (ref 8.6–10.4)
CHLORIDE BLD-SCNC: 100 MMOL/L (ref 98–107)
CO2: 26 MMOL/L (ref 20–31)
CREAT SERPL-MCNC: <0.4 MG/DL (ref 0.5–0.9)
DIFFERENTIAL TYPE: ABNORMAL
EOSINOPHILS RELATIVE PERCENT: 2 % (ref 1–4)
GFR AFRICAN AMERICAN: ABNORMAL ML/MIN
GFR NON-AFRICAN AMERICAN: ABNORMAL ML/MIN
GFR SERPL CREATININE-BSD FRML MDRD: ABNORMAL ML/MIN/{1.73_M2}
GFR SERPL CREATININE-BSD FRML MDRD: ABNORMAL ML/MIN/{1.73_M2}
GLUCOSE BLD-MCNC: 111 MG/DL (ref 70–99)
HCT VFR BLD CALC: 33.9 % (ref 36.3–47.1)
HEMOGLOBIN: 10.4 G/DL (ref 11.9–15.1)
IMMATURE GRANULOCYTES: 1 %
LYMPHOCYTES # BLD: 8 % (ref 24–43)
MCH RBC QN AUTO: 31 PG (ref 25.2–33.5)
MCHC RBC AUTO-ENTMCNC: 30.7 G/DL (ref 28.4–34.8)
MCV RBC AUTO: 100.9 FL (ref 82.6–102.9)
MONOCYTES # BLD: 9 % (ref 3–12)
NRBC AUTOMATED: 0 PER 100 WBC
PDW BLD-RTO: 14.5 % (ref 11.8–14.4)
PLATELET # BLD: 347 K/UL (ref 138–453)
PLATELET ESTIMATE: ABNORMAL
PMV BLD AUTO: 10 FL (ref 8.1–13.5)
POTASSIUM SERPL-SCNC: 4.3 MMOL/L (ref 3.7–5.3)
RBC # BLD: 3.36 M/UL (ref 3.95–5.11)
RBC # BLD: ABNORMAL 10*6/UL
REASON FOR REJECTION: NORMAL
SEDIMENTATION RATE, ERYTHROCYTE: 35 MM (ref 0–30)
SEG NEUTROPHILS: 79 % (ref 36–65)
SEGMENTED NEUTROPHILS ABSOLUTE COUNT: 7.78 K/UL (ref 1.5–8.1)
SODIUM BLD-SCNC: 137 MMOL/L (ref 135–144)
WBC # BLD: 9.6 K/UL (ref 3.5–11.3)
WBC # BLD: ABNORMAL 10*3/UL
ZZ NTE CLEAN UP: ORDERED TEST: NORMAL
ZZ NTE WITH NAME CLEAN UP: SPECIMEN SOURCE: NORMAL

## 2021-03-16 PROCEDURE — 6360000004 HC RX CONTRAST MEDICATION: Performed by: EMERGENCY MEDICINE

## 2021-03-16 PROCEDURE — 80048 BASIC METABOLIC PNL TOTAL CA: CPT

## 2021-03-16 PROCEDURE — 85652 RBC SED RATE AUTOMATED: CPT

## 2021-03-16 PROCEDURE — 6370000000 HC RX 637 (ALT 250 FOR IP): Performed by: EMERGENCY MEDICINE

## 2021-03-16 PROCEDURE — 70487 CT MAXILLOFACIAL W/DYE: CPT

## 2021-03-16 PROCEDURE — 99283 EMERGENCY DEPT VISIT LOW MDM: CPT

## 2021-03-16 PROCEDURE — 2580000003 HC RX 258: Performed by: EMERGENCY MEDICINE

## 2021-03-16 PROCEDURE — 85025 COMPLETE CBC W/AUTO DIFF WBC: CPT

## 2021-03-16 PROCEDURE — 86140 C-REACTIVE PROTEIN: CPT

## 2021-03-16 RX ORDER — 0.9 % SODIUM CHLORIDE 0.9 %
80 INTRAVENOUS SOLUTION INTRAVENOUS ONCE
Status: COMPLETED | OUTPATIENT
Start: 2021-03-16 | End: 2021-03-16

## 2021-03-16 RX ORDER — AMOXICILLIN AND CLAVULANATE POTASSIUM 875; 125 MG/1; MG/1
1 TABLET, FILM COATED ORAL 2 TIMES DAILY
Qty: 20 TABLET | Refills: 0 | Status: SHIPPED | OUTPATIENT
Start: 2021-03-16 | End: 2021-03-26

## 2021-03-16 RX ORDER — AMOXICILLIN AND CLAVULANATE POTASSIUM 875; 125 MG/1; MG/1
1 TABLET, FILM COATED ORAL ONCE
Status: COMPLETED | OUTPATIENT
Start: 2021-03-16 | End: 2021-03-16

## 2021-03-16 RX ORDER — SODIUM CHLORIDE 0.9 % (FLUSH) 0.9 %
10 SYRINGE (ML) INJECTION PRN
Status: DISCONTINUED | OUTPATIENT
Start: 2021-03-16 | End: 2021-03-16 | Stop reason: HOSPADM

## 2021-03-16 RX ADMIN — IOPAMIDOL 75 ML: 755 INJECTION, SOLUTION INTRAVENOUS at 19:40

## 2021-03-16 RX ADMIN — SODIUM CHLORIDE 80 ML: 9 INJECTION, SOLUTION INTRAVENOUS at 19:40

## 2021-03-16 RX ADMIN — AMOXICILLIN AND CLAVULANATE POTASSIUM 1 TABLET: 875; 125 TABLET, FILM COATED ORAL at 20:18

## 2021-03-16 RX ADMIN — Medication 10 ML: at 19:40

## 2021-03-16 RX ADMIN — AMOXICILLIN AND CLAVULANATE POTASSIUM 1 TABLET: 875; 125 TABLET, FILM COATED ORAL at 20:22

## 2021-03-16 ASSESSMENT — VISUAL ACUITY: OU: 1

## 2021-03-16 ASSESSMENT — ENCOUNTER SYMPTOMS
SHORTNESS OF BREATH: 0
VOMITING: 0
FACIAL SWELLING: 1
ABDOMINAL PAIN: 0
SORE THROAT: 0
VOICE CHANGE: 0
TROUBLE SWALLOWING: 0

## 2021-03-16 NOTE — ED PROVIDER NOTES
EMERGENCY DEPARTMENT ENCOUNTER    Pt Name: Prashanth Loaiza  MRN: [id number]  [place] 1945  Date of evaluation: 3/16/21  CHIEF COMPLAINT       Chief Complaint   Patient presents with    Facial Swelling     HISTORY OF PRESENT ILLNESS   Patient is a 79-year-old female here with swelling to the right side of her face and around her right eye. She has had it intermittently for a few days. She has a history of Parkinson's and hypertension. She is brought in by her  who lives with her. He states the swelling has fluctuated over the past few days. Patient denies any pain with extraocular movements or any blurry vision. Denies fevers or chills. She wears glasses. She states she has had a mild intermittent headache but not currently. No nausea or vomiting. No neck pain or stiffness. Her  states she was recently treated for a plate in the roof of her mouth with antibiotics for infection. No chest pain shortness of breath no abdominal pain no focal weakness numbness tingling. Denies being on ACE or ARB. REVIEW OF SYSTEMS     Review of Systems   Constitutional: Negative for chills and fever. HENT: Positive for facial swelling. Negative for sore throat, trouble swallowing and voice change. Eyes: Negative for visual disturbance. Respiratory: Negative for shortness of breath. Cardiovascular: Negative for chest pain. Gastrointestinal: Negative for abdominal pain and vomiting. Genitourinary: Negative for difficulty urinating. Musculoskeletal: Negative for neck pain and neck stiffness. Skin: Negative for rash. Neurological: Positive for headaches. Negative for weakness. Psychiatric/Behavioral: Negative for confusion.      PASTMEDICAL HISTORY     Past Medical History:   Diagnosis Date    Cancer [facility])     breast    Degenerative joint disease (DJD) of lumbar spine     Hearing loss     Hypertension     Osteoporosis, senile     [facility]'s [facility] [address]     Parkinson's disease [facility] [address]     Tremor      SURGICAL HISTORY       Past Surgical History:   Procedure Laterality Date    BREAST LUMPECTOMY Right     COLONOSCOPY      MASTECTOMY Right 2016    simple on 2016 - and again 10/13/2016    OTHER SURGICAL HISTORY      Radio frequency lesioning for low back pain    OTHER SURGICAL HISTORY  2016    growth removed from left side of neck    SIGMOIDOSCOPY      TONSILLECTOMY       CURRENT MEDICATIONS       Previous Medications    ALPRAZOLAM (XANAX) 0.25 MG TABLET    Take 0.25 mg by mouth 3 times daily    BACLOFEN (LIORESAL) 10 MG TABLET    Take 10 mg by mouth 3 times daily    CALCIUM CARB-CHOLECALCIFEROL (CALCIUM + D3) 600-200 MG-UNIT TABS    Take 1 tablet by mouth daily    CARBIDOPA-LEVODOPA ER (RYTARY) 23.75-95 MG CPCR    Take by mouth 4 times daily And takes 195 total of 6 times daily    DENOSUMAB (PROLIA SC)    Inject into the skin Indications: once every 6 months    DIAZEPAM (VALIUM) 5 MG TABLET    Take 1 tablet by mouth as needed    ESCITALOPRAM (LEXAPRO) 10 MG TABLET    Take 1 tablet by mouth daily    MULTIPLE VITAMINS-CALCIUM (ONE-A-DAY WOMENS PO)    Take 1 tablet by mouth daily    NAPROXEN (NAPROSYN) 500 MG TABLET    Take 1 tablet by mouth 2 times daily (with meals) for 7 days    TRAZODONE (DESYREL) 50 MG TABLET    Take 1 tablet by mouth 3 times daily      ALLERGIES     has No Known Allergies. FAMILY HISTORY     She indicated that her mother is . She indicated that her father is . She indicated that the status of her brother is unknown.      SOCIAL HISTORY       Social History     Tobacco Use    Smoking status: Former Smoker     Quit date: 1996     Years since quittin.2    Smokeless tobacco: Never Used   Substance Use Topics    Alcohol use: No     Alcohol/week: 0.0 standard drinks    Drug use: No     PHYSICAL EXAM     INITIAL VITALS: BP (!) 184/94   Pulse 96   Temp 98.4 °F (36.9 °C)   Resp 18   Wt 104 lb (47.2 kg)   SpO2 99%   BMI 20.31 kg/m²    Physical Exam  Vitals signs and nursing note reviewed. Constitutional:       General: She is not in acute distress. Appearance: She is not ill-appearing, toxic-appearing or diaphoretic. HENT:      Head: Normocephalic and atraumatic. Right periorbital erythema present. No left periorbital erythema. Jaw: There is normal jaw occlusion. No trismus. Comments: No pain with extraocular movements. Right Ear: Tympanic membrane normal.      Left Ear: Tympanic membrane normal.      Mouth/Throat:      Mouth: Mucous membranes are moist. No angioedema. Pharynx: Oropharynx is clear. Uvula midline. Tonsils: No tonsillar abscesses. Eyes:      General: Vision grossly intact. No scleral icterus. Right eye: No foreign body, discharge or hordeolum. Extraocular Movements: Extraocular movements intact. Conjunctiva/sclera:      Right eye: Right conjunctiva is not injected. No chemosis. Pupils: Pupils are equal, round, and reactive to light. Comments: No proptosis or chemosis bilaterally. No pain with extraocular movements. No tenderness over the right temporal area, intact temporal artery pulse. Neck:      Musculoskeletal: Normal range of motion and neck supple. Cardiovascular:      Rate and Rhythm: Normal rate and regular rhythm. Pulses: Normal pulses. Pulmonary:      Effort: Pulmonary effort is normal. No respiratory distress. Abdominal:      General: There is no distension. Palpations: Abdomen is soft. Tenderness: There is no abdominal tenderness. Musculoskeletal: Normal range of motion. General: No deformity. Right lower leg: No edema. Left lower leg: No edema. Skin:     General: Skin is warm and dry. Capillary Refill: Capillary refill takes less than 2 seconds. Findings: No rash. Neurological:      General: No focal deficit present.       Mental Status: She is alert and oriented to person, place, and time.      Cranial Nerves: No cranial nerve deficit. Psychiatric:         Thought Content: Thought content normal.         MEDICAL DECISION MAKING:          Please see ED Course below for MDM/ED course. DDx: Orbital cellulitis, facial abscess    All patient's question's and concerns were answered prior to disposition and patient and/or family expressed understanding and agreement of treatment plan. NIH STROKE SCALE:            PROCEDURES:    Procedures    DIAGNOSTIC RESULTS   EKG:All EKG's are interpreted by the Emergency Department Physician who either signs or Co-signs this chart in the absence of a cardiologist.    RADIOLOGY:All plain film, CT, MRI, and formal ultrasound images (except ED bedside ultrasound) are read by the radiologist, see reports below, unless otherwisenoted in MDM or here. CT FACIAL BONES W CONTRAST   Final Result   Probable dental abscesses as above. Soft tissue swelling consistent with   cellulitis. LABS: All lab results were reviewed by myself, and all abnormals are listed below. Labs Reviewed   CBC WITH AUTO DIFFERENTIAL - Abnormal; Notable for the following components:       Result Value    RBC 3.36 (*)     Hemoglobin 10.4 (*)     Hematocrit 33.9 (*)     RDW 14.5 (*)     Seg Neutrophils 79 (*)     Lymphocytes 8 (*)     Immature Granulocytes 1 (*)     Absolute Lymph # 0.74 (*)     All other components within normal limits   SEDIMENTATION RATE - Abnormal; Notable for the following components:    Sed Rate 35 (*)     All other components within normal limits   BASIC METABOLIC PANEL - Abnormal; Notable for the following components:    Glucose 111 (*)     CREATININE <0.40 (*)     All other components within normal limits   SPECIMEN REJECTION   C-REACTIVE PROTEIN       EMERGENCY DEPARTMENTCOURSE:     Patient is a 17-year-old female here with pain swelling redness around the right eye and pain to the maxillary right cheek area. On exam she has swelling redness.   No real pain with extraocular movements. Oropharynx to the right upper palate she does have an area that appears possibly infected. Doubt orbital cellulitis at this time however given possibility will check labs and CT with contrast to further evaluate. CT concerning for dental abscess and soft tissue cellulitis. Blood work shows anemia elevated ESR no electrolyte imbalance. Given that she does have the fluctuance in her mouth I did recommend aspiration versus incision and drainage. She is  having some dystonia from her Parkinson's. Her  and her are declining any aspiration or incision drainage at this time due to this that she cannot hold still. At this point will place on Augmentin given dental infection. She was recently on amoxicillin. Recommend warm compresses and follow-up with dental care or oral surgery.  states she did have a follow-up with dentist today but they came here instead. Will place on Augmentin first dose here. Strict return precautions given. Vitals:    Vitals:    03/16/21 1642 03/16/21 1645   BP:  (!) 184/94   Pulse:  96   Resp:  18   Temp:  98.4 °F (36.9 °C)   SpO2:  99%   Weight: 104 lb (47.2 kg)        The patient was given the following medications while in the emergency department:  Orders Placed This Encounter   Medications    0.9 % sodium chloride bolus    iopamidol (ISOVUE-370) 76 % injection 75 mL    sodium chloride flush 0.9 % injection 10 mL    amoxicillin-clavulanate (AUGMENTIN) 875-125 MG per tablet 1 tablet     Order Specific Question:   Antimicrobial Indications     Answer:   Head and Neck Infection    amoxicillin-clavulanate (AUGMENTIN) 875-125 MG per tablet     Sig: Take 1 tablet by mouth 2 times daily for 10 days     Dispense:  20 tablet     Refill:  0     CONSULTS:  None    FINAL IMPRESSION      1. Dental abscess    2.  Periorbital cellulitis of right eye          DISPOSITION/PLAN   DISPOSITION Decision To Discharge 03/16/2021 08:07:17 PM PATIENT REFERRED TO:  Raina Cranker, MD [address] [facility]  [phone]    Schedule an appointment as soon as possible for a visit       [facility] ED  [address] [facility]  [phone]    If symptoms worsen    DISCHARGE MEDICATIONS:  New Prescriptions    AMOXICILLIN-CLAVULANATE (AUGMENTIN) 875-125 MG PER TABLET    Take 1 tablet by mouth 2 times daily for 10 days     Malou Morelos MD  Attending Emergency Physician    This note was created with the assistance of a speech-recognition program. While intending to generate a document that actually reflects the content of the visit, no guarantees can be provided that every mistake has been identified and corrected by editing.                    Malou Morelos MD  03/16/21 2018

## 2021-05-15 ENCOUNTER — HOSPITAL ENCOUNTER (EMERGENCY)
Age: 76
Discharge: HOME | End: 2021-05-15
Attending: EMERGENCY MEDICINE
Payer: MEDICARE

## 2021-05-15 VITALS
OXYGEN SATURATION: 100 % | DIASTOLIC BLOOD PRESSURE: 62 MMHG | HEART RATE: 88 BPM | WEIGHT: 99 LBS | SYSTOLIC BLOOD PRESSURE: 97 MMHG | BODY MASS INDEX: 19.44 KG/M2 | RESPIRATION RATE: 16 BRPM | HEIGHT: 60 IN | TEMPERATURE: 99.1 F

## 2021-05-15 DIAGNOSIS — M25.512 PAIN OF BOTH SHOULDER JOINTS: Primary | ICD-10-CM

## 2021-05-15 DIAGNOSIS — M25.511 PAIN OF BOTH SHOULDER JOINTS: Primary | ICD-10-CM

## 2021-05-15 LAB
ANION GAP SERPL CALCULATED.3IONS-SCNC: 12 MMOL/L (ref 9–17)
BUN BLDV-MCNC: 20 MG/DL (ref 8–23)
BUN/CREAT BLD: 40 (ref 9–20)
CALCIUM SERPL-MCNC: 9.1 MG/DL (ref 8.6–10.4)
CHLORIDE BLD-SCNC: 102 MMOL/L (ref 98–107)
CO2: 25 MMOL/L (ref 20–31)
CREAT SERPL-MCNC: 0.5 MG/DL (ref 0.5–0.9)
GFR AFRICAN AMERICAN: >60 ML/MIN
GFR NON-AFRICAN AMERICAN: >60 ML/MIN
GFR SERPL CREATININE-BSD FRML MDRD: ABNORMAL ML/MIN/{1.73_M2}
GFR SERPL CREATININE-BSD FRML MDRD: ABNORMAL ML/MIN/{1.73_M2}
GLUCOSE BLD-MCNC: 89 MG/DL (ref 70–99)
POTASSIUM SERPL-SCNC: 4.2 MMOL/L (ref 3.7–5.3)
SODIUM BLD-SCNC: 139 MMOL/L (ref 135–144)
TOTAL CK: 91 U/L (ref 26–192)

## 2021-05-15 PROCEDURE — 82550 ASSAY OF CK (CPK): CPT

## 2021-05-15 PROCEDURE — 99284 EMERGENCY DEPT VISIT MOD MDM: CPT

## 2021-05-15 PROCEDURE — 6360000002 HC RX W HCPCS: Performed by: EMERGENCY MEDICINE

## 2021-05-15 PROCEDURE — 96372 THER/PROPH/DIAG INJ SC/IM: CPT

## 2021-05-15 PROCEDURE — 80048 BASIC METABOLIC PNL TOTAL CA: CPT

## 2021-05-15 RX ORDER — LORAZEPAM 0.5 MG/1
0.5 TABLET ORAL 3 TIMES DAILY
Status: ON HOLD | COMMUNITY
End: 2021-06-10 | Stop reason: SDUPTHER

## 2021-05-15 RX ORDER — KETOROLAC TROMETHAMINE 30 MG/ML
30 INJECTION, SOLUTION INTRAMUSCULAR; INTRAVENOUS ONCE
Status: COMPLETED | OUTPATIENT
Start: 2021-05-15 | End: 2021-05-15

## 2021-05-15 RX ORDER — OXYCODONE HYDROCHLORIDE 5 MG/1
5 TABLET ORAL EVERY 6 HOURS PRN
Qty: 15 TABLET | Refills: 0 | Status: SHIPPED | OUTPATIENT
Start: 2021-05-15 | End: 2021-05-18

## 2021-05-15 RX ADMIN — KETOROLAC TROMETHAMINE 30 MG: 30 INJECTION, SOLUTION INTRAMUSCULAR; INTRAVENOUS at 10:47

## 2021-05-15 ASSESSMENT — ENCOUNTER SYMPTOMS: BACK PAIN: 0

## 2021-05-15 ASSESSMENT — PAIN SCALES - GENERAL: PAINLEVEL_OUTOF10: 8

## 2021-05-15 NOTE — ED PROVIDER NOTES
[facility]  Emergency Medicine Department    Pt Name: Helio Doty  MRN: [id number]  [place] 1945  Date of evaluation: 5/15/2021  Provider: Jenny Guallpa MD    CHIEF COMPLAINT     Chief Complaint   Patient presents with    Shoulder Pain     fell 2 days ago, bilat       HISTORY OF PRESENT ILLNESS  (Location/Symptom, Timing/Onset, Context/Setting,Quality, Duration, Modifying Factors, Severity.)   Helio Doty is a 68 y.o. female who presents to the emergency department complaining of bilateral shoulder pain for the past 2 days. She does have a history of Parkinson's and her tremors have been getting worse over the past few months. She has not had any changes in her Parkinson's medications. She denies any injuries or falls in the past few weeks. She rates the pain as an 8 out of 10. She denies any weakness or numbness in the upper extremities. Nursing Notes were reviewed. ALLERGIES     Patient has no known allergies. CURRENT MEDICATIONS       Discharge Medication List as of 5/15/2021 11:38 AM      CONTINUE these medications which have NOT CHANGED    Details   LORazepam (ATIVAN) 0.5 MG tablet Take 0.5 mg by mouth three times daily. Historical Med      Calcium Carbonate (CALCIUM 500 PO) Take by mouth dailyHistorical Med      Cholecalciferol (VITAMIN D3 PO) Take by mouth dailyHistorical Med      Magnesium 400 MG CAPS Take by mouth dailyHistorical Med      FIBER PO Take by mouth dailyHistorical Med      Carbidopa-Levodopa ER (RYTARY) 23.75-95 MG CPCR Take by mouth 4 times daily And takes 145mg and 195 total of 6 times dailyHistorical Med      traZODone (DESYREL) 50 MG tablet Take 1 tablet by mouth nightly Historical Med      escitalopram (LEXAPRO) 10 MG tablet Take 1 tablet by mouth dailyHistorical Med      ALPRAZolam (XANAX) 0.25 MG tablet Take 0.25 mg by mouth 3 times daily      Multiple Vitamins-Calcium (ONE-A-DAY WOMENS PO) Take 1 tablet by mouth daily      naproxen Negative for back pain. Allergic/Immunologic: Negative for immunocompromised state. Neurological: Negative for weakness, numbness and headaches. All other systems reviewed and are negative. PHYSICAL EXAM    (up to 7 for level 4, 8 or more for level 5)     ED Triage Vitals [05/15/21 0956]   BP Temp Temp Source Pulse Resp SpO2 Height Weight   97/62 99.1 °F (37.3 °C) Oral 88 16 100 % 5' (1.524 m) 99 lb (44.9 kg)       Physical Exam  Vitals and nursing note reviewed. Constitutional:       General: She is not in acute distress. Appearance: She is well-developed. HENT:      Head: Normocephalic and atraumatic. Eyes:      Extraocular Movements: Extraocular movements intact. Cardiovascular:      Rate and Rhythm: Normal rate and regular rhythm. Heart sounds: Normal heart sounds. Pulmonary:      Effort: Pulmonary effort is normal. No respiratory distress. Breath sounds: Normal breath sounds. Musculoskeletal:         General: No tenderness or deformity. Normal range of motion. Cervical back: Normal range of motion and neck supple. Skin:     General: Skin is warm and dry. Neurological:      Mental Status: She is alert and oriented to person, place, and time. Coordination: Coordination abnormal (severe involuntary movements of b/l upper extremities, trunk, and legs). Psychiatric:         Behavior: Behavior normal.         Thought Content:  Thought content normal.         Judgment: Judgment normal.         DIAGNOSTIC RESULTS     RADIOLOGY:   Non-plain film images such as CT, Ultrasound and MRI are read by theradiologist. Plain radiographic images are visualized and preliminarily interpreted by the emergency physician with the below findings:    None indicated     ED BEDSIDE ULTRASOUND:   Performed by ED Physician - none    LABS:  Labs Reviewed   BASIC METABOLIC PANEL - Abnormal; Notable for the following components:       Result Value    Bun/Cre Ratio 40 (*)     All other components within normal limits   CK       All other labs were within normal range or not returned as of this dictation. EMERGENCYDEPARTMENT COURSE and DIFFERENTIAL DIAGNOSIS/MDM:   Vitals:    Vitals:    05/15/21 0956   BP: 97/62   Pulse: 88   Resp: 16   Temp: 99.1 °F (37.3 °C)   TempSrc: Oral   SpO2: 100%   Weight: 99 lb (44.9 kg)   Height: 5' (1.524 m)     68 y.o. F with a history of parkinson's with b/l upper arm pain. No ROM deficits and no neurovasc deficits on exam.  Given no injuries, I do not feel that imaging is indicated. Will discharge with Rx for pain medication to use as needed. Recommend f/u with her neurologist.    CONSULTS:  None    PROCEDURES:  None indicated     FINAL IMPRESSION     1. Pain of both shoulder joints          DISPOSITION/PLAN   DISPOSITION Decision To Discharge 05/15/2021 11:32:41 AM    PATIENT REFERRED TO:   Anup Lowry MD  [address] [place] [address]  [phone]    Schedule an appointment as soon as possible for a visit in 1 week  For Follow up    DISCHARGE MEDICATIONS:     Discharge Medication List as of 5/15/2021 11:38 AM      START taking these medications    Details   oxyCODONE (ROXICODONE) 5 MG immediate release tablet Take 1 tablet by mouth every 6 hours as needed for Pain for up to 3 days. Intended supply: 3 days.  Take lowest dose possible to manage pain, Disp-15 tablet, R-0Normal           (Please note that portions of this note were completed with a voice recognition program.  Efforts were made to edit the dictations butoccasionally words are mis-transcribed.)    Prashant Villarreal MD  Attending Emergency Physician         Prashant Villarreal MD  05/17/21 1904

## 2021-05-17 ENCOUNTER — APPOINTMENT (OUTPATIENT)
Dept: GENERAL RADIOLOGY | Age: 76
End: 2021-05-17
Payer: MEDICARE

## 2021-05-17 ENCOUNTER — HOSPITAL ENCOUNTER (EMERGENCY)
Age: 76
Discharge: HOME | End: 2021-05-17
Attending: EMERGENCY MEDICINE
Payer: MEDICARE

## 2021-05-17 VITALS
HEIGHT: 60 IN | WEIGHT: 98 LBS | OXYGEN SATURATION: 95 % | BODY MASS INDEX: 19.24 KG/M2 | TEMPERATURE: 99.7 F | RESPIRATION RATE: 18 BRPM | HEART RATE: 66 BPM

## 2021-05-17 DIAGNOSIS — M25.511 ACUTE PAIN OF RIGHT SHOULDER: Primary | ICD-10-CM

## 2021-05-17 PROCEDURE — 99282 EMERGENCY DEPT VISIT SF MDM: CPT

## 2021-05-17 PROCEDURE — 73030 X-RAY EXAM OF SHOULDER: CPT

## 2021-05-17 PROCEDURE — 6370000000 HC RX 637 (ALT 250 FOR IP): Performed by: EMERGENCY MEDICINE

## 2021-05-17 PROCEDURE — 72040 X-RAY EXAM NECK SPINE 2-3 VW: CPT

## 2021-05-17 RX ORDER — OXYCODONE HYDROCHLORIDE AND ACETAMINOPHEN 5; 325 MG/1; MG/1
1 TABLET ORAL EVERY 6 HOURS PRN
Qty: 15 TABLET | Refills: 0 | Status: SHIPPED | OUTPATIENT
Start: 2021-05-17 | End: 2021-05-20

## 2021-05-17 RX ORDER — PREDNISONE 50 MG/1
50 TABLET ORAL DAILY
Qty: 5 TABLET | Refills: 0 | Status: SHIPPED | OUTPATIENT
Start: 2021-05-17 | End: 2021-05-22

## 2021-05-17 RX ORDER — DIAZEPAM 5 MG/1
5 TABLET ORAL ONCE
Status: COMPLETED | OUTPATIENT
Start: 2021-05-17 | End: 2021-05-17

## 2021-05-17 RX ADMIN — DIAZEPAM 5 MG: 5 TABLET ORAL at 19:49

## 2021-05-17 ASSESSMENT — ENCOUNTER SYMPTOMS
COUGH: 0
COLOR CHANGE: 0
EYE REDNESS: 0
VOMITING: 0
DIARRHEA: 0
EYE DISCHARGE: 0
NAUSEA: 0
SORE THROAT: 0
SHORTNESS OF BREATH: 0
SHORTNESS OF BREATH: 0
RHINORRHEA: 0

## 2021-05-17 NOTE — ED PROVIDER NOTES
EMERGENCY DEPARTMENT ENCOUNTER    Pt Name: Jordon Davis  MRN: [id number]  [place] 1945  Date of evaluation: 5/17/21  CHIEF COMPLAINT       Chief Complaint   Patient presents with    Shoulder Pain     Right, no injury     HISTORY OF PRESENT ILLNESS   This is a 68-year-old female that presents with complaints of pain and discomfort to the right shoulder and posterior neck. Patient has a history of severe Parkinson's disease, she was seen here 2 days ago with similar complaints, she had no imaging performed at that time, the patient presented with continued pain and discomfort in spite of the pain medications. Patient denies any numbness tingling or weakness in the arm. She has no chest pain. She denies any other complaints. REVIEW OF SYSTEMS     Review of Systems   Constitutional: Negative for chills and fever. HENT: Negative for rhinorrhea and sore throat. Eyes: Negative for discharge, redness and visual disturbance. Respiratory: Negative for cough and shortness of breath. Cardiovascular: Negative for chest pain, palpitations and leg swelling. Gastrointestinal: Negative for diarrhea, nausea and vomiting. Musculoskeletal: Negative for arthralgias, myalgias and neck pain. Right shoulder pain, right neck pain   Skin: Negative for color change and rash. Neurological: Negative for seizures, weakness and headaches. Psychiatric/Behavioral: Negative for hallucinations, self-injury and suicidal ideas.      PASTMEDICAL HISTORY     Past Medical History:   Diagnosis Date    Cancer [facility])     breast    Degenerative joint disease (DJD) of lumbar spine     Hearing loss     Hypertension     Osteoporosis, senile     Parkinson's disease ([address]     Parkinson's disease ([address]     Tremor      Past Problem List  Patient Active Problem List   Diagnosis Code    Parkinson's disease ([address] G20    Spondylosis of lumbar region without myelopathy or radiculopathy M47.816    Acute cystitis indicated that the status of her brother is unknown. SOCIAL HISTORY       Social History     Tobacco Use    Smoking status: Former Smoker     Quit date: 1996     Years since quittin.3    Smokeless tobacco: Never Used   Substance Use Topics    Alcohol use: No     Alcohol/week: 0.0 standard drinks    Drug use: No     PHYSICAL EXAM     INITIAL VITALS: Pulse 66   Temp 99.7 °F (37.6 °C) (Oral)   Resp 18   Ht 5' (1.524 m)   Wt 98 lb (44.5 kg)   SpO2 95%   BMI 19.14 kg/m²    Physical Exam  Constitutional:       Appearance: Normal appearance. HENT:      Head: Normocephalic and atraumatic. Eyes:      Extraocular Movements: Extraocular movements intact. Pupils: Pupils are equal, round, and reactive to light. Cardiovascular:      Rate and Rhythm: Normal rate and regular rhythm. Pulmonary:      Effort: Pulmonary effort is normal.      Breath sounds: Normal breath sounds. Abdominal:      General: Abdomen is flat. Palpations: Abdomen is soft. Tenderness: There is no abdominal tenderness. Musculoskeletal:      Right shoulder: Tenderness present. No bony tenderness. Decreased range of motion. Arms:    Neurological:      Mental Status: She is alert. MEDICAL DECISION MAKIN-year-old female presents with continued pain and discomfort in the right shoulder, plan is x-rays and reevaluation. 8:45 PM EDT  Patient's x-rays are largely unremarkable, the patient's shoulder x-ray was suboptimal due to her patient movement, they questioned a posterior shoulder dislocation, on clinical exam the patient does not have a shoulder dislocation, there is no drop-off, she has normal range of motion of the shoulder. Plan is short course of pain control, I realize she was just discharged with some oxycodone but the patient is continuing to have severe pain, she does not seem to be a risk for drug dependence or drug abuse.          CRITICAL CARE: PROCEDURES:    Procedures    DIAGNOSTIC RESULTS   EKG:All EKG's are interpreted by the Emergency Department Physician who either signs or Co-signs this chart in the absence of a cardiologist.        RADIOLOGY:All plain film, CT, MRI, and formal ultrasound images (except ED bedside ultrasound) are read by the radiologist, see reports below, unless otherwisenoted in MDM or here. XR CERVICAL SPINE (2-3 VIEWS)   Final Result   1. No acute osseous abnormality by radiograph. There is limited   visualization of the lower cervical spine. 2. Multilevel degenerative changes. XR SHOULDER RIGHT (MIN 2 VIEWS)   Final Result   Osteoarthritis of the shoulder      Suboptimal exam.  Questionable posterior shoulder dislocation. Lucency along   the superior scapula and fracture cannot be excluded. Follow-up CT   recommended for further evaluation if indicated. Kojo Cochran LABS: All lab results were reviewed by myself, and all abnormals are listed below. Labs Reviewed - No data to display    EMERGENCY DEPARTMENTCOURSE:         Vitals:    Vitals:    05/17/21 1911 05/17/21 1913   Pulse: 66    Resp: 18    Temp: 99.7 °F (37.6 °C)    TempSrc: Oral    SpO2: 95%    Weight:  98 lb (44.5 kg)   Height:  5' (1.524 m)       The patient was given the following medications while in the emergency department:  Orders Placed This Encounter   Medications    diazePAM (VALIUM) tablet 5 mg    oxyCODONE-acetaminophen (PERCOCET) 5-325 MG per tablet     Sig: Take 1 tablet by mouth every 6 hours as needed for Pain for up to 3 days. WARNING:  May cause drowsiness. May impair ability to operate vehicles or machinery. Do not use in combination with alcohol. Dispense:  15 tablet     Refill:  0     CONSULTS:  None    FINAL IMPRESSION      1.  Acute pain of right shoulder          DISPOSITION/PLAN   DISPOSITION Decision To Discharge 05/17/2021 08:41:45 PM      PATIENT REFERRED TO:  Mary Vee MD  [address] [place] [address]  [phone]    Schedule an appointment as soon as possible for a visit in 2 days      DISCHARGE MEDICATIONS:  New Prescriptions    OXYCODONE-ACETAMINOPHEN (PERCOCET) 5-325 MG PER TABLET    Take 1 tablet by mouth every 6 hours as needed for Pain for up to 3 days. WARNING:  May cause drowsiness. May impair ability to operate vehicles or machinery. Do not use in combination with alcohol.      Sean Joiner MD  Attending Emergency Physician                   Sean Joiner MD  05/17/21 3228

## 2021-05-18 NOTE — ED NOTES
Discharge paperwork reviewed with patient and family member. Medications discussed, all questions answered. Pt placed in wheelchair.       Gabino Parisi RN  05/17/21 5715

## 2021-05-29 ENCOUNTER — APPOINTMENT (OUTPATIENT)
Dept: GENERAL RADIOLOGY | Age: 76
End: 2021-05-29
Payer: MEDICARE

## 2021-05-29 ENCOUNTER — HOSPITAL ENCOUNTER (EMERGENCY)
Age: 76
Discharge: HOME | End: 2021-05-29
Attending: EMERGENCY MEDICINE
Payer: MEDICARE

## 2021-05-29 VITALS
DIASTOLIC BLOOD PRESSURE: 115 MMHG | TEMPERATURE: 98.6 F | RESPIRATION RATE: 22 BRPM | BODY MASS INDEX: 19.24 KG/M2 | SYSTOLIC BLOOD PRESSURE: 155 MMHG | OXYGEN SATURATION: 95 % | HEART RATE: 91 BPM | HEIGHT: 60 IN | WEIGHT: 98 LBS

## 2021-05-29 DIAGNOSIS — R06.00 DYSPNEA, UNSPECIFIED TYPE: Primary | ICD-10-CM

## 2021-05-29 LAB
EKG ATRIAL RATE: 121 BPM
EKG P AXIS: 69 DEGREES
EKG P-R INTERVAL: 114 MS
EKG Q-T INTERVAL: 322 MS
EKG QRS DURATION: 66 MS
EKG QTC CALCULATION (BAZETT): 457 MS
EKG R AXIS: 19 DEGREES
EKG T AXIS: 40 DEGREES
EKG VENTRICULAR RATE: 121 BPM

## 2021-05-29 PROCEDURE — 93010 ELECTROCARDIOGRAM REPORT: CPT | Performed by: INTERNAL MEDICINE

## 2021-05-29 PROCEDURE — 93005 ELECTROCARDIOGRAM TRACING: CPT | Performed by: EMERGENCY MEDICINE

## 2021-05-29 PROCEDURE — 94640 AIRWAY INHALATION TREATMENT: CPT

## 2021-05-29 PROCEDURE — 6370000000 HC RX 637 (ALT 250 FOR IP): Performed by: EMERGENCY MEDICINE

## 2021-05-29 PROCEDURE — 71045 X-RAY EXAM CHEST 1 VIEW: CPT

## 2021-05-29 PROCEDURE — 99283 EMERGENCY DEPT VISIT LOW MDM: CPT

## 2021-05-29 PROCEDURE — 94761 N-INVAS EAR/PLS OXIMETRY MLT: CPT

## 2021-05-29 RX ORDER — ALBUTEROL SULFATE 90 UG/1
2 AEROSOL, METERED RESPIRATORY (INHALATION) EVERY 4 HOURS PRN
Qty: 1 INHALER | Refills: 0 | Status: SHIPPED | OUTPATIENT
Start: 2021-05-29

## 2021-05-29 RX ORDER — ALBUTEROL SULFATE 90 UG/1
2 AEROSOL, METERED RESPIRATORY (INHALATION) EVERY 6 HOURS PRN
Status: DISCONTINUED | OUTPATIENT
Start: 2021-05-29 | End: 2021-05-29 | Stop reason: HOSPADM

## 2021-05-29 RX ADMIN — ALBUTEROL SULFATE 2 PUFF: 90 AEROSOL, METERED RESPIRATORY (INHALATION) at 12:21

## 2021-05-29 ASSESSMENT — ENCOUNTER SYMPTOMS
ABDOMINAL PAIN: 0
FACIAL SWELLING: 0
DIARRHEA: 0
VOMITING: 0
CONSTIPATION: 0
COLOR CHANGE: 0
COUGH: 0
SHORTNESS OF BREATH: 1
EYE DISCHARGE: 0
EYE REDNESS: 0

## 2021-05-29 ASSESSMENT — PAIN SCALES - GENERAL: PAINLEVEL_OUTOF10: 9

## 2021-05-29 ASSESSMENT — PAIN DESCRIPTION - DESCRIPTORS: DESCRIPTORS: SHARP

## 2021-05-29 ASSESSMENT — PAIN DESCRIPTION - LOCATION: LOCATION: GENERALIZED

## 2021-05-29 ASSESSMENT — PAIN DESCRIPTION - FREQUENCY: FREQUENCY: CONTINUOUS

## 2021-05-29 NOTE — ED PROVIDER NOTES
[facility] ED  EMERGENCY DEPARTMENT ENCOUNTER      Pt Name: He Max  MRN: [id number]  [place] 1945  Date of evaluation: 5/29/2021  Provider: Hilaria Oglesby MD    CHIEF COMPLAINT       Chief Complaint   Patient presents with    Shortness of Breath     onset 1 hr ago         HISTORY OF PRESENT ILLNESS  (Location/Symptom, Timing/Onset, Context/Setting, Quality, Duration, Modifying Factors, Severity.)   He Max is a 68 y.o. female who presents to the emergency department for feeling short of breath. This started an hour ago. She was at home when it started not doing anything in particular. No fever or trauma.  states that she has an inhaler at home but she cannot do it herself because she has Parkinson and he has trouble timing it. Her whole body hurts and she rated it as a 9. Nursing Notes were reviewed. ALLERGIES     Patient has no known allergies. CURRENT MEDICATIONS       Previous Medications    ALPRAZOLAM (XANAX) 0.25 MG TABLET    Take 0.25 mg by mouth 3 times daily    BACLOFEN (LIORESAL) 10 MG TABLET    Take 10 mg by mouth 3 times daily    CALCIUM CARB-CHOLECALCIFEROL (CALCIUM + D3) 600-200 MG-UNIT TABS    Take 1 tablet by mouth daily    CALCIUM CARBONATE (CALCIUM 500 PO)    Take by mouth daily    CARBIDOPA-LEVODOPA ER (RYTARY) 23.75-95 MG CPCR    Take by mouth 4 times daily And takes 145mg and 195 total of 6 times daily    CHOLECALCIFEROL (VITAMIN D3 PO)    Take by mouth daily    DENOSUMAB (PROLIA SC)    Inject into the skin Indications: once every 6 months    DIAZEPAM (VALIUM) 5 MG TABLET    Take 1 tablet by mouth as needed    ESCITALOPRAM (LEXAPRO) 10 MG TABLET    Take 1 tablet by mouth daily    FIBER PO    Take by mouth daily    LORAZEPAM (ATIVAN) 0.5 MG TABLET    Take 0.5 mg by mouth three times daily.     MAGNESIUM 400 MG CAPS    Take by mouth daily    MULTIPLE VITAMINS-CALCIUM (ONE-A-DAY WOMENS PO)    Take 1 tablet by mouth daily    NAPROXEN (NAPROSYN) 500 MG TABLET    Take 1 tablet by mouth 2 times daily (with meals) for 7 days    TRAZODONE (DESYREL) 50 MG TABLET    Take 1 tablet by mouth nightly        PAST MEDICAL HISTORY         Diagnosis Date    Cancer [facility])     breast    Degenerative joint disease (DJD) of lumbar spine     Hearing loss     Hypertension     Osteoporosis, senile     Parkinson's disease ([address]     Parkinson's disease ([address]     Tremor        SURGICAL HISTORY           Procedure Laterality Date    BREAST LUMPECTOMY Right     COLONOSCOPY      MASTECTOMY Right 2016    simple on 2016 - and again 10/13/2016    OTHER SURGICAL HISTORY      Radio frequency lesioning for low back pain    OTHER SURGICAL HISTORY  2016    growth removed from left side of neck    SIGMOIDOSCOPY      TONSILLECTOMY           FAMILY HISTORY           Problem Relation Age of Onset    Stroke Mother     Heart Disease Mother     Heart Attack Father     Heart Disease Father     High Blood Pressure Father     High Blood Pressure Brother      Family Status   Relation Name Status    Mother      Father      Brother  (Not Specified)        SOCIAL HISTORY      reports that she quit smoking about 25 years ago. She has never used smokeless tobacco. She reports that she does not drink alcohol and does not use drugs. REVIEW OF SYSTEMS    (2-9 systems for level 4, 10 or more for level 5)     Review of Systems   Constitutional: Negative for chills, fatigue and fever. HENT: Negative for congestion, ear discharge and facial swelling. Eyes: Negative for discharge and redness. Respiratory: Positive for shortness of breath. Negative for cough. Cardiovascular: Negative for chest pain. Gastrointestinal: Negative for abdominal pain, constipation, diarrhea and vomiting. Genitourinary: Negative for dysuria and hematuria. Musculoskeletal: Negative for arthralgias. Skin: Negative for color change and rash.    Neurological: Negative for syncope, numbness and headaches. Hematological: Negative for adenopathy. Psychiatric/Behavioral: Negative for confusion. The patient is not nervous/anxious. Except as noted above the remainder of the review of systems was reviewed and negative. PHYSICAL EXAM    (up to 7 for level 4, 8 or more for level 5)     Vitals:    05/29/21 1200 05/29/21 1223 05/29/21 1259 05/29/21 1311   BP: (!) 155/115      Pulse: 120  91    Resp: 22      Temp:  98.6 °F (37 °C)     TempSrc:  Oral     SpO2: 98%  95% 95%   Weight: 98 lb (44.5 kg)      Height: 5' (1.524 m)          Physical Exam  Vitals reviewed. Constitutional:       General: She is not in acute distress. Appearance: She is well-developed. She is not diaphoretic. HENT:      Head: Normocephalic and atraumatic. Eyes:      General:         Right eye: No discharge. Left eye: No discharge. Cardiovascular:      Rate and Rhythm: Regular rhythm. Tachycardia present. Pulmonary:      Effort: No respiratory distress. Breath sounds: No stridor. Abdominal:      General: There is no distension. Musculoskeletal:         General: Normal range of motion. Cervical back: Neck supple. Lymphadenopathy:      Cervical: No cervical adenopathy. Skin:     Findings: No erythema or rash. Neurological:      Mental Status: She is alert and oriented to person, place, and time. Psychiatric:         Behavior: Behavior normal.             DIAGNOSTIC RESULTS     EKG: All EKG's are interpreted by the Emergency Department Physician who either signs or Co-signs this chart in the absence of a cardiologist.    EKG on my interpretation shows sinus tachycardia with a rate of 121.     RADIOLOGY:   Non-plain film images such as CT, Ultrasound and MRI are read by the radiologist. Plain radiographic images are visualized and preliminarily interpreted by the emergency physician with the below findings:    Interpretation per the Radiologist below, if available at the time of this note:    XR CHEST PORTABLE    Result Date: 5/29/2021  EXAMINATION: ONE XRAY VIEW OF THE CHEST 5/29/2021 12:28 pm COMPARISON: 11/23/2020 HISTORY: ORDERING SYSTEM PROVIDED HISTORY: sob TECHNOLOGIST PROVIDED HISTORY: sob Acuity: Acute Type of Exam: Unknown FINDINGS: Heart size stable. Old bilateral rib fractures. Unchanged elevated right hemidiaphragm. No new airspace disease. No pneumothorax. No pulmonary vascular congestion or edema. No acute cardiopulmonary process     LABS:  Labs Reviewed - No data to display    All other labs were within normal range or not returned as of this dictation. EMERGENCY DEPARTMENT COURSE and DIFFERENTIAL DIAGNOSIS/MDM:   Vitals:    Vitals:    05/29/21 1200 05/29/21 1223 05/29/21 1259 05/29/21 1311   BP: (!) 155/115      Pulse: 120  91    Resp: 22      Temp:  98.6 °F (37 °C)     TempSrc:  Oral     SpO2: 98%  95% 95%   Weight: 98 lb (44.5 kg)      Height: 5' (1.524 m)          Orders Placed This Encounter   Medications    albuterol sulfate  (90 Base) MCG/ACT inhaler 2 puff    albuterol sulfate HFA (PROVENTIL HFA) 108 (90 Base) MCG/ACT inhaler     Sig: Inhale 2 puffs into the lungs every 4 hours as needed for Wheezing or Shortness of Breath (Space out to every 6 hours as symptoms improve) Space out to every 6 hours as symptoms improve. Dispense:  1 Inhaler     Refill:  0       Medical Decision Making: Chest x-ray is negative. Her heart rate came down. She felt much better after MDI treatment with albuterol and she is able to be discharged home. Prescription given for MDI of albuterol. Treatment diagnosis and follow-up were discussed with the patient and her . CONSULTS:  None    PROCEDURES:  None    FINAL IMPRESSION      1.  Dyspnea, unspecified type          DISPOSITION/PLAN   DISPOSITION Decision To Discharge 05/29/2021 01:12:01 PM      PATIENT REFERRED TO:   Leda Servin MD  [address] [address]  [phone]      As needed    [facility] ED  [address] [facility]  [phone]    If symptoms worsen      DISCHARGE MEDICATIONS:     New Prescriptions    ALBUTEROL SULFATE HFA (PROVENTIL HFA) 108 (90 BASE) MCG/ACT INHALER    Inhale 2 puffs into the lungs every 4 hours as needed for Wheezing or Shortness of Breath (Space out to every 6 hours as symptoms improve) Space out to every 6 hours as symptoms improve.          (Please note that portions of this note were completed with a voice recognition program.  Efforts were made to edit the dictations but occasionally words are mis-transcribed.)    Khanh Marquis MD  Attending Emergency Physician           Khanh Marquis MD  05/29/21 4391

## 2021-06-06 ENCOUNTER — HOSPITAL ENCOUNTER (INPATIENT)
Age: 76
LOS: 10 days | Discharge: HOSPICE | DRG: 057 | End: 2021-06-16
Attending: EMERGENCY MEDICINE | Admitting: INTERNAL MEDICINE
Payer: MEDICARE

## 2021-06-06 ENCOUNTER — APPOINTMENT (OUTPATIENT)
Dept: CT IMAGING | Age: 76
DRG: 057 | End: 2021-06-06
Payer: MEDICARE

## 2021-06-06 DIAGNOSIS — G20 PARKINSON'S DISEASE (HCC): Primary | ICD-10-CM

## 2021-06-06 DIAGNOSIS — F41.9 ANXIETY: ICD-10-CM

## 2021-06-06 DIAGNOSIS — M47.816 SPONDYLOSIS OF LUMBAR REGION WITHOUT MYELOPATHY OR RADICULOPATHY: ICD-10-CM

## 2021-06-06 LAB
ABSOLUTE EOS #: 0.17 K/UL (ref 0–0.4)
ABSOLUTE IMMATURE GRANULOCYTE: 0 K/UL (ref 0–0.3)
ABSOLUTE LYMPH #: 1.03 K/UL (ref 1–4.8)
ABSOLUTE MONO #: 0.69 K/UL (ref 0.2–0.8)
ALBUMIN SERPL-MCNC: 3.5 G/DL (ref 3.5–5.2)
ALBUMIN/GLOBULIN RATIO: ABNORMAL (ref 1–2.5)
ALP BLD-CCNC: 72 U/L (ref 35–104)
ALT SERPL-CCNC: <5 U/L (ref 5–33)
ANION GAP SERPL CALCULATED.3IONS-SCNC: 11 MMOL/L (ref 9–17)
AST SERPL-CCNC: 13 U/L
BASOPHILS # BLD: 1 %
BASOPHILS ABSOLUTE: 0.09 K/UL (ref 0–0.2)
BILIRUB SERPL-MCNC: 0.29 MG/DL (ref 0.3–1.2)
BILIRUBIN DIRECT: <0.08 MG/DL
BILIRUBIN URINE: NEGATIVE
BILIRUBIN, INDIRECT: ABNORMAL MG/DL (ref 0–1)
BUN BLDV-MCNC: 15 MG/DL (ref 8–23)
BUN/CREAT BLD: 33 (ref 9–20)
CALCIUM SERPL-MCNC: 9.3 MG/DL (ref 8.6–10.4)
CHLORIDE BLD-SCNC: 107 MMOL/L (ref 98–107)
CO2: 24 MMOL/L (ref 20–31)
COLOR: YELLOW
COMMENT UA: NORMAL
CREAT SERPL-MCNC: 0.45 MG/DL (ref 0.5–0.9)
DIFFERENTIAL TYPE: ABNORMAL
EOSINOPHILS RELATIVE PERCENT: 2 % (ref 1–4)
GFR AFRICAN AMERICAN: >60 ML/MIN
GFR NON-AFRICAN AMERICAN: >60 ML/MIN
GFR SERPL CREATININE-BSD FRML MDRD: ABNORMAL ML/MIN/{1.73_M2}
GFR SERPL CREATININE-BSD FRML MDRD: ABNORMAL ML/MIN/{1.73_M2}
GLOBULIN: ABNORMAL G/DL (ref 1.5–3.8)
GLUCOSE BLD-MCNC: 93 MG/DL (ref 70–99)
GLUCOSE URINE: NEGATIVE
HCT VFR BLD CALC: 41.5 % (ref 36.3–47.1)
HEMOGLOBIN: 11.6 G/DL (ref 11.9–15.1)
IMMATURE GRANULOCYTES: 0 %
KETONES, URINE: NEGATIVE
LACTIC ACID: 1 MMOL/L (ref 0.5–2.2)
LEUKOCYTE ESTERASE, URINE: NEGATIVE
LYMPHOCYTES # BLD: 12 % (ref 24–44)
MCH RBC QN AUTO: 30.1 PG (ref 25.2–33.5)
MCHC RBC AUTO-ENTMCNC: 28 G/DL (ref 28.4–34.8)
MCV RBC AUTO: 107.5 FL (ref 82.6–102.9)
MONOCYTES # BLD: 8 % (ref 1–7)
MORPHOLOGY: ABNORMAL
NITRITE, URINE: NEGATIVE
NRBC AUTOMATED: ABNORMAL PER 100 WBC
PDW BLD-RTO: 14.4 % (ref 11.8–14.4)
PH UA: 6.5 (ref 5–8)
PLATELET # BLD: 329 K/UL (ref 138–453)
PLATELET ESTIMATE: ABNORMAL
PMV BLD AUTO: 9.4 FL (ref 8.1–13.5)
POTASSIUM SERPL-SCNC: 4.3 MMOL/L (ref 3.7–5.3)
PROTEIN UA: NEGATIVE
RBC # BLD: 3.86 M/UL (ref 3.95–5.11)
RBC # BLD: ABNORMAL 10*6/UL
SEG NEUTROPHILS: 77 % (ref 36–66)
SEGMENTED NEUTROPHILS ABSOLUTE COUNT: 6.62 K/UL (ref 1.8–7.7)
SODIUM BLD-SCNC: 142 MMOL/L (ref 135–144)
SPECIFIC GRAVITY UA: 1.01 (ref 1–1.03)
TOTAL PROTEIN: 6.1 G/DL (ref 6.4–8.3)
TROPONIN INTERP: ABNORMAL
TROPONIN T: ABNORMAL NG/ML
TROPONIN, HIGH SENSITIVITY: 33 NG/L (ref 0–14)
TSH SERPL DL<=0.05 MIU/L-ACNC: 0.98 MIU/L (ref 0.3–5)
TURBIDITY: CLEAR
URINE HGB: NEGATIVE
UROBILINOGEN, URINE: NORMAL
WBC # BLD: 8.6 K/UL (ref 3.5–11.3)
WBC # BLD: ABNORMAL 10*3/UL

## 2021-06-06 PROCEDURE — 70450 CT HEAD/BRAIN W/O DYE: CPT

## 2021-06-06 PROCEDURE — 99285 EMERGENCY DEPT VISIT HI MDM: CPT

## 2021-06-06 PROCEDURE — 80307 DRUG TEST PRSMV CHEM ANLYZR: CPT

## 2021-06-06 PROCEDURE — 80076 HEPATIC FUNCTION PANEL: CPT

## 2021-06-06 PROCEDURE — 84443 ASSAY THYROID STIM HORMONE: CPT

## 2021-06-06 PROCEDURE — 85025 COMPLETE CBC W/AUTO DIFF WBC: CPT

## 2021-06-06 PROCEDURE — 6370000000 HC RX 637 (ALT 250 FOR IP): Performed by: PSYCHIATRY & NEUROLOGY

## 2021-06-06 PROCEDURE — 83605 ASSAY OF LACTIC ACID: CPT

## 2021-06-06 PROCEDURE — 93005 ELECTROCARDIOGRAM TRACING: CPT | Performed by: EMERGENCY MEDICINE

## 2021-06-06 PROCEDURE — 81003 URINALYSIS AUTO W/O SCOPE: CPT

## 2021-06-06 PROCEDURE — 80048 BASIC METABOLIC PNL TOTAL CA: CPT

## 2021-06-06 PROCEDURE — 84484 ASSAY OF TROPONIN QUANT: CPT

## 2021-06-06 PROCEDURE — 2060000000 HC ICU INTERMEDIATE R&B

## 2021-06-06 RX ORDER — ONDANSETRON 2 MG/ML
4 INJECTION INTRAMUSCULAR; INTRAVENOUS EVERY 6 HOURS PRN
Status: DISCONTINUED | OUTPATIENT
Start: 2021-06-06 | End: 2021-06-06 | Stop reason: SDUPTHER

## 2021-06-06 RX ORDER — SODIUM CHLORIDE 9 MG/ML
25 INJECTION, SOLUTION INTRAVENOUS PRN
Status: DISCONTINUED | OUTPATIENT
Start: 2021-06-06 | End: 2021-06-16 | Stop reason: HOSPADM

## 2021-06-06 RX ORDER — ACETAMINOPHEN 325 MG/1
650 TABLET ORAL EVERY 4 HOURS PRN
Status: DISCONTINUED | OUTPATIENT
Start: 2021-06-06 | End: 2021-06-06 | Stop reason: DRUGHIGH

## 2021-06-06 RX ORDER — SODIUM CHLORIDE 0.9 % (FLUSH) 0.9 %
5-40 SYRINGE (ML) INJECTION EVERY 12 HOURS SCHEDULED
Status: DISCONTINUED | OUTPATIENT
Start: 2021-06-06 | End: 2021-06-16 | Stop reason: HOSPADM

## 2021-06-06 RX ORDER — ACETAMINOPHEN 325 MG/1
650 TABLET ORAL EVERY 6 HOURS PRN
Status: DISCONTINUED | OUTPATIENT
Start: 2021-06-06 | End: 2021-06-16 | Stop reason: HOSPADM

## 2021-06-06 RX ORDER — TRAZODONE HYDROCHLORIDE 50 MG/1
50 TABLET ORAL NIGHTLY
Status: DISCONTINUED | OUTPATIENT
Start: 2021-06-06 | End: 2021-06-16 | Stop reason: HOSPADM

## 2021-06-06 RX ORDER — SODIUM CHLORIDE 9 MG/ML
25 INJECTION, SOLUTION INTRAVENOUS PRN
Status: DISCONTINUED | OUTPATIENT
Start: 2021-06-06 | End: 2021-06-06 | Stop reason: SDUPTHER

## 2021-06-06 RX ORDER — SODIUM CHLORIDE 0.9 % (FLUSH) 0.9 %
5-40 SYRINGE (ML) INJECTION PRN
Status: DISCONTINUED | OUTPATIENT
Start: 2021-06-06 | End: 2021-06-16 | Stop reason: HOSPADM

## 2021-06-06 RX ORDER — ONDANSETRON 4 MG/1
4 TABLET, ORALLY DISINTEGRATING ORAL EVERY 8 HOURS PRN
Status: DISCONTINUED | OUTPATIENT
Start: 2021-06-06 | End: 2021-06-16 | Stop reason: HOSPADM

## 2021-06-06 RX ORDER — POLYETHYLENE GLYCOL 3350 17 G/17G
17 POWDER, FOR SOLUTION ORAL DAILY PRN
Status: DISCONTINUED | OUTPATIENT
Start: 2021-06-06 | End: 2021-06-16 | Stop reason: HOSPADM

## 2021-06-06 RX ORDER — ALPRAZOLAM 0.25 MG/1
0.25 TABLET ORAL 3 TIMES DAILY
Status: DISCONTINUED | OUTPATIENT
Start: 2021-06-06 | End: 2021-06-07

## 2021-06-06 RX ORDER — BACLOFEN 10 MG/1
10 TABLET ORAL 3 TIMES DAILY
Status: DISCONTINUED | OUTPATIENT
Start: 2021-06-06 | End: 2021-06-11

## 2021-06-06 RX ORDER — ALBUTEROL SULFATE 90 UG/1
2 AEROSOL, METERED RESPIRATORY (INHALATION) EVERY 4 HOURS PRN
Status: DISCONTINUED | OUTPATIENT
Start: 2021-06-06 | End: 2021-06-16 | Stop reason: HOSPADM

## 2021-06-06 RX ORDER — ACETAMINOPHEN 650 MG/1
650 SUPPOSITORY RECTAL EVERY 6 HOURS PRN
Status: DISCONTINUED | OUTPATIENT
Start: 2021-06-06 | End: 2021-06-16 | Stop reason: HOSPADM

## 2021-06-06 RX ORDER — SODIUM CHLORIDE 0.9 % (FLUSH) 0.9 %
5-40 SYRINGE (ML) INJECTION EVERY 12 HOURS SCHEDULED
Status: DISCONTINUED | OUTPATIENT
Start: 2021-06-06 | End: 2021-06-06 | Stop reason: SDUPTHER

## 2021-06-06 RX ORDER — ONDANSETRON 4 MG/1
4 TABLET, ORALLY DISINTEGRATING ORAL EVERY 8 HOURS PRN
Status: DISCONTINUED | OUTPATIENT
Start: 2021-06-06 | End: 2021-06-06 | Stop reason: SDUPTHER

## 2021-06-06 RX ORDER — SODIUM CHLORIDE 0.9 % (FLUSH) 0.9 %
5-40 SYRINGE (ML) INJECTION PRN
Status: DISCONTINUED | OUTPATIENT
Start: 2021-06-06 | End: 2021-06-06 | Stop reason: SDUPTHER

## 2021-06-06 RX ORDER — ONDANSETRON 2 MG/ML
4 INJECTION INTRAMUSCULAR; INTRAVENOUS EVERY 6 HOURS PRN
Status: DISCONTINUED | OUTPATIENT
Start: 2021-06-06 | End: 2021-06-16 | Stop reason: HOSPADM

## 2021-06-06 RX ADMIN — CARBIDOPA AND LEVODOPA 1 TABLET: 25; 100 TABLET ORAL at 17:30

## 2021-06-06 ASSESSMENT — PAIN SCALES - PAIN ASSESSMENT IN ADVANCED DEMENTIA (PAINAD)
BODYLANGUAGE: 0
NEGVOCALIZATION: 0
BREATHING: 0
FACIALEXPRESSION: 0
TOTALSCORE: 0
CONSOLABILITY: 0

## 2021-06-06 NOTE — ED NOTES
Attempted to give med in apple sauce patient not attempting to swallow. Made physician aware. Patient's family members verbalizing patient's wishes of no heroic measures [facility] made Dr Rich Bhat aware.       Aniceto Drummond RN  06/06/21 9467

## 2021-06-06 NOTE — ED NOTES
Patient continues to be non-verbal and not follow any commands. Physicians are contributing to Parkinsons and not taken meds.       Annemarie Regan RN  06/06/21 8928

## 2021-06-06 NOTE — ED PROVIDER NOTES
(ONE-A-DAY WOMENS PO) Take 1 tablet by mouth daily       ! ! - Potential duplicate medications found. Please discuss with provider. ALLERGIES     has No Known Allergies. FAMILY HISTORY     She indicated that her mother is . She indicated that her father is . She indicated that the status of her brother is unknown. SOCIAL HISTORY       Social History     Tobacco Use    Smoking status: Former Smoker     Quit date: 1996     Years since quittin.4    Smokeless tobacco: Never Used   Substance Use Topics    Alcohol use: No     Alcohol/week: 0.0 standard drinks    Drug use: No     PHYSICAL EXAM     INITIAL VITALS: BP (!) 154/80   Pulse 78   Temp 97.5 °F (36.4 °C) (Axillary)   Resp 16   Ht 5' (1.524 m)   Wt 100 lb 2 oz (45.4 kg)   SpO2 98%   BMI 19.55 kg/m²    Physical Exam  Constitutional:       General: She is not in acute distress. Appearance: She is well-developed. HENT:      Head: Normocephalic. Eyes:      Pupils: Pupils are equal, round, and reactive to light. Cardiovascular:      Rate and Rhythm: Normal rate and regular rhythm. Heart sounds: Normal heart sounds. Pulmonary:      Effort: Pulmonary effort is normal. No respiratory distress. Breath sounds: Normal breath sounds. Abdominal:      General: Bowel sounds are normal.      Palpations: Abdomen is soft. Tenderness: There is no abdominal tenderness. Musculoskeletal:         General: Normal range of motion. Skin:     General: Skin is warm and dry. Neurological:      Mental Status: She is alert. Motor: Tremor present. MEDICAL DECISION MAKIN-year-old female presenting to the emergency room with altered mental status increased rigidity likely secondary to Parkinson's disease. Patient's vitals are stable here in the ED. Blood work shows no major abnormalities suggesting significant electrolyte imbalance acute renal injury liver failure obvious infectious process.   CT imaging of the head is negative for acute bleed or trauma. Patient will be admitted to medicine with neurology consult. ED Course as of Yeison 10 0743   Maira Luna Jun 06, 2021 1642 Case discussed with Dr. Gianni Guadalupe on for Dr. Maldonado Woods he states that the is freezing up is likely due to being off the levodopa carbidopa. Patient ok to stay at [facility]     [EG]   1642 Case discussed with Dr. Glory Gonzalez; he would like me to discuss with neuro    [EG]      ED Course User Index  [EG] Александр Ho MD       CRITICAL CARE:       PROCEDURES:    Procedures    DIAGNOSTIC RESULTS   EKG:All EKG's are interpreted by the Emergency Department Physician who either signs or Co-signs this chart in the absence of a cardiologist.    EKG-sinus rate 73  Motion artifact interpretation affected    QTc 476  Nonspecific EKG    RADIOLOGY:All plain film, CT, MRI, and formal ultrasound images (except ED bedside ultrasound) are read by the radiologist, see reports below, unless otherwisenoted in MDM or here. XR CHEST 1 VIEW   Final Result   Findings suggest mild pulmonary venous congestion without localized   consolidation or rebeca effusion. CT Head WO Contrast   Final Result   Chronic findings in the brain without acute CT abnormality identified. LABS: All lab results were reviewed by myself, and all abnormals are listed below.   Labs Reviewed   CBC WITH AUTO DIFFERENTIAL - Abnormal; Notable for the following components:       Result Value    RBC 3.86 (*)     Hemoglobin 11.6 (*)     .5 (*)     MCHC 28.0 (*)     Seg Neutrophils 77 (*)     Lymphocytes 12 (*)     Monocytes 8 (*)     All other components within normal limits   BASIC METABOLIC PANEL W/ REFLEX TO MG FOR LOW K - Abnormal; Notable for the following components:    CREATININE 0.45 (*)     Bun/Cre Ratio 33 (*)     All other components within normal limits   HEPATIC FUNCTION PANEL - Abnormal; Notable for the following components:    ALT <5 (*)     Total Bilirubin 0.29 (*)     Total Protein 6.1 (*)     All other components within normal limits   TROPONIN - Abnormal; Notable for the following components:    Troponin, High Sensitivity 33 (*)     All other components within normal limits   COMPREHENSIVE METABOLIC PANEL W/ REFLEX TO MG FOR LOW K - Abnormal; Notable for the following components:    Glucose 115 (*)     Bun/Cre Ratio 35 (*)     Sodium 146 (*)     Chloride 108 (*)     ALT <5 (*)     All other components within normal limits   CBC WITH AUTO DIFFERENTIAL - Abnormal; Notable for the following components:    WBC 14.8 (*)     Seg Neutrophils 81 (*)     Lymphocytes 5 (*)     Immature Granulocytes 1 (*)     Segs Absolute 11.99 (*)     Absolute Lymph # 0.74 (*)     Absolute Eos # 0.59 (*)     All other components within normal limits   BASIC METABOLIC PANEL - Abnormal; Notable for the following components:    CREATININE 0.41 (*)     Bun/Cre Ratio 39 (*)     All other components within normal limits   CBC WITH AUTO DIFFERENTIAL - Abnormal; Notable for the following components:    RBC 3.79 (*)     Hemoglobin 11.6 (*)     .7 (*)     Seg Neutrophils 77 (*)     Lymphocytes 11 (*)     All other components within normal limits   CBC WITH AUTO DIFFERENTIAL - Abnormal; Notable for the following components:    WBC 12.3 (*)     RBC 3.87 (*)     Hemoglobin 11.7 (*)     Seg Neutrophils 83 (*)     Lymphocytes 7 (*)     Immature Granulocytes 1 (*)     Segs Absolute 10.11 (*)     Absolute Lymph # 0.85 (*)     All other components within normal limits   BASIC METABOLIC PANEL - Abnormal; Notable for the following components:    Glucose 118 (*)     CREATININE <0.40 (*)     Potassium 3.4 (*)     All other components within normal limits   BASIC METABOLIC PANEL - Abnormal; Notable for the following components:    Glucose 102 (*)     CREATININE 0.43 (*)     Bun/Cre Ratio 26 (*)     Potassium 3.5 (*)     All other components within normal limits   LACTIC ACID   URINALYSIS   TSH WITH REFLEX URINE DRUG SCREEN   CBC WITH AUTO DIFFERENTIAL       EMERGENCY DEPARTMENTCOURSE:         Vitals:    Vitals:    06/09/21 1620 06/09/21 1950 06/10/21 0017 06/10/21 0447   BP: (!) 146/77 127/74 (!) 160/83 (!) 154/80   Pulse: 80 81 73 78   Resp: 16 16 14 16   Temp: 97.7 °F (36.5 °C) 97.9 °F (36.6 °C) 97.3 °F (36.3 °C) 97.5 °F (36.4 °C)   TempSrc: Oral Axillary Axillary Axillary   SpO2: 99% 98% 98% 98%   Weight:       Height:           The patient was given the following medications while in the emergency department:  Orders Placed This Encounter   Medications    carbidopa-levodopa (SINEMET)  MG per tablet 1 tablet    DISCONTD: sodium chloride flush 0.9 % injection 5-40 mL    DISCONTD: sodium chloride flush 0.9 % injection 5-40 mL    DISCONTD: 0.9 % sodium chloride infusion    DISCONTD: enoxaparin (LOVENOX) injection 40 mg    DISCONTD: acetaminophen (TYLENOL) tablet 650 mg    DISCONTD: ondansetron (ZOFRAN-ODT) disintegrating tablet 4 mg    DISCONTD: ondansetron (ZOFRAN) injection 4 mg    DISCONTD: carbidopa-levodopa (SINEMET)  MG per tablet 1 tablet    albuterol sulfate  (90 Base) MCG/ACT inhaler 2 puff     Order Specific Question:   Initiate RT Bronchodilator Protocol     Answer:    Yes    DISCONTD: ALPRAZolam (XANAX) tablet 0.25 mg    baclofen (LIORESAL) tablet 10 mg    traZODone (DESYREL) tablet 50 mg    sodium chloride flush 0.9 % injection 5-40 mL    sodium chloride flush 0.9 % injection 5-40 mL    0.9 % sodium chloride infusion    enoxaparin (LOVENOX) injection 30 mg    OR Linked Order Group     ondansetron (ZOFRAN-ODT) disintegrating tablet 4 mg     ondansetron (ZOFRAN) injection 4 mg    polyethylene glycol (GLYCOLAX) packet 17 g    OR Linked Order Group     acetaminophen (TYLENOL) tablet 650 mg     acetaminophen (TYLENOL) suppository 650 mg    DISCONTD: hydrALAZINE (APRESOLINE) injection 5 mg    escitalopram (LEXAPRO) tablet 20 mg    LORazepam (ATIVAN) tablet 0.5 mg  DISCONTD: Carbidopa-Levodopa ER 23.75-95 MG CPCR 195 mg    DISCONTD: RYTARY ER 48.  capsule      Family to bring in medication    DISCONTD: RYTARY ER 36./ capsule    Carbidopa-Levodopa ER 36. MG CPCR 1 capsule    Carbidopa-Levodopa ER 48. MG CPCR 1 capsule    DISCONTD: albuterol (PROVENTIL) nebulizer solution 2.5 mg     Order Specific Question:   Initiate RT Bronchodilator Protocol     Answer: Yes    albuterol (PROVENTIL) nebulizer solution 2.5 mg     Order Specific Question:   Initiate RT Bronchodilator Protocol     Answer: Yes    DISCONTD: LORazepam (ATIVAN) injection 0.5 mg    potassium chloride (MICRO-K) extended release capsule 10 mEq    oxyCODONE-acetaminophen (PERCOCET) 5-325 MG per tablet 1 tablet    Carbidopa-Levodopa ER 48. MG CPCR 1 capsule    Carbidopa-Levodopa ER 36. MG CPCR 1 capsule    LORazepam (ATIVAN) tablet 0.5 mg    hydrALAZINE (APRESOLINE) tablet 10 mg     CONSULTS:  IP CONSULT TO INTERNAL MEDICINE  IP CONSULT TO NEUROLOGY  IP CONSULT TO PALLIATIVE CARE  IP CONSULT TO SOCIAL WORK  IP CONSULT TO HOSPICE    FINAL IMPRESSION      1.  Parkinson's disease [facility])          [address] [facility] Admitted 06/06/2021 05:42:26 PM      PATIENT REFERRED TO:  Cesario Borges MD  [address] [place] [address]  [phone]    Schedule an appointment as soon as possible for a visit in 1 week  for hospital follow-up    Josie Barakat MD  [address] [place] [address]  [phone]    Schedule an appointment as soon as possible for a visit in 1 week  for hospital follow-up    DISCHARGE MEDICATIONS:  Current Discharge Medication List        Josephine Espinosa MD  Attending Emergency Physician                 Long Garcia MD  06/10/21 5914

## 2021-06-06 NOTE — ED NOTES
Into change brief and patient acknowledged conversation and said \"ok\" family at bedside and aware.       Thalia Arvizu RN  06/06/21 2470

## 2021-06-06 NOTE — ED NOTES
Patient brought in by EMS family called verbalizing patient has been sleeping a lot. Patient has hx of Parkinsons and family hasn't been giving meds. Patient non-verbal squinted eyes when named called and responds to pain. When attempted to draw blood pulling arm back. Patient's vitals stable. Cleansed patient from a brief full of urine. Patient's family concerned she is dying.       Hermes Foster RN  06/06/21 5771

## 2021-06-07 ENCOUNTER — APPOINTMENT (OUTPATIENT)
Dept: GENERAL RADIOLOGY | Age: 76
DRG: 057 | End: 2021-06-07
Payer: MEDICARE

## 2021-06-07 LAB
ABSOLUTE EOS #: 0.59 K/UL (ref 0–0.44)
ABSOLUTE IMMATURE GRANULOCYTE: 0.15 K/UL (ref 0–0.3)
ABSOLUTE LYMPH #: 0.74 K/UL (ref 1.1–3.7)
ABSOLUTE MONO #: 1.18 K/UL (ref 0.1–1.2)
ALBUMIN SERPL-MCNC: 3.8 G/DL (ref 3.5–5.2)
ALBUMIN/GLOBULIN RATIO: ABNORMAL (ref 1–2.5)
ALP BLD-CCNC: 79 U/L (ref 35–104)
ALT SERPL-CCNC: <5 U/L (ref 5–33)
ANION GAP SERPL CALCULATED.3IONS-SCNC: 16 MMOL/L (ref 9–17)
AST SERPL-CCNC: 10 U/L
BASOPHILS # BLD: 1 % (ref 0–2)
BASOPHILS ABSOLUTE: 0.15 K/UL (ref 0–0.2)
BILIRUB SERPL-MCNC: 0.35 MG/DL (ref 0.3–1.2)
BUN BLDV-MCNC: 19 MG/DL (ref 8–23)
BUN/CREAT BLD: 35 (ref 9–20)
CALCIUM SERPL-MCNC: 9.6 MG/DL (ref 8.6–10.4)
CHLORIDE BLD-SCNC: 108 MMOL/L (ref 98–107)
CO2: 22 MMOL/L (ref 20–31)
CREAT SERPL-MCNC: 0.55 MG/DL (ref 0.5–0.9)
DIFFERENTIAL TYPE: ABNORMAL
EKG ATRIAL RATE: 73 BPM
EKG P AXIS: 64 DEGREES
EKG P-R INTERVAL: 126 MS
EKG Q-T INTERVAL: 406 MS
EKG QRS DURATION: 62 MS
EKG QTC CALCULATION (BAZETT): 447 MS
EKG R AXIS: 20 DEGREES
EKG T AXIS: 49 DEGREES
EKG VENTRICULAR RATE: 73 BPM
EOSINOPHILS RELATIVE PERCENT: 4 % (ref 1–4)
GFR AFRICAN AMERICAN: >60 ML/MIN
GFR NON-AFRICAN AMERICAN: >60 ML/MIN
GFR SERPL CREATININE-BSD FRML MDRD: ABNORMAL ML/MIN/{1.73_M2}
GFR SERPL CREATININE-BSD FRML MDRD: ABNORMAL ML/MIN/{1.73_M2}
GLUCOSE BLD-MCNC: 115 MG/DL (ref 70–99)
HCT VFR BLD CALC: 38.7 % (ref 36.3–47.1)
HEMOGLOBIN: 12 G/DL (ref 11.9–15.1)
IMMATURE GRANULOCYTES: 1 %
LYMPHOCYTES # BLD: 5 % (ref 24–43)
MCH RBC QN AUTO: 30.4 PG (ref 25.2–33.5)
MCHC RBC AUTO-ENTMCNC: 31 G/DL (ref 28.4–34.8)
MCV RBC AUTO: 98 FL (ref 82.6–102.9)
MONOCYTES # BLD: 8 % (ref 3–12)
NRBC AUTOMATED: 0 PER 100 WBC
PDW BLD-RTO: 14.3 % (ref 11.8–14.4)
PLATELET # BLD: 349 K/UL (ref 138–453)
PLATELET ESTIMATE: ABNORMAL
PMV BLD AUTO: 9.4 FL (ref 8.1–13.5)
POTASSIUM SERPL-SCNC: 3.7 MMOL/L (ref 3.7–5.3)
RBC # BLD: 3.95 M/UL (ref 3.95–5.11)
RBC # BLD: ABNORMAL 10*6/UL
SEG NEUTROPHILS: 81 % (ref 36–65)
SEGMENTED NEUTROPHILS ABSOLUTE COUNT: 11.99 K/UL (ref 1.5–8.1)
SODIUM BLD-SCNC: 146 MMOL/L (ref 135–144)
TOTAL PROTEIN: 6.4 G/DL (ref 6.4–8.3)
WBC # BLD: 14.8 K/UL (ref 3.5–11.3)
WBC # BLD: ABNORMAL 10*3/UL

## 2021-06-07 PROCEDURE — 6370000000 HC RX 637 (ALT 250 FOR IP): Performed by: PSYCHIATRY & NEUROLOGY

## 2021-06-07 PROCEDURE — 85025 COMPLETE CBC W/AUTO DIFF WBC: CPT

## 2021-06-07 PROCEDURE — 80053 COMPREHEN METABOLIC PANEL: CPT

## 2021-06-07 PROCEDURE — 99223 1ST HOSP IP/OBS HIGH 75: CPT | Performed by: PSYCHIATRY & NEUROLOGY

## 2021-06-07 PROCEDURE — 6360000002 HC RX W HCPCS: Performed by: INTERNAL MEDICINE

## 2021-06-07 PROCEDURE — 97535 SELF CARE MNGMENT TRAINING: CPT

## 2021-06-07 PROCEDURE — 71045 X-RAY EXAM CHEST 1 VIEW: CPT

## 2021-06-07 PROCEDURE — 92610 EVALUATE SWALLOWING FUNCTION: CPT

## 2021-06-07 PROCEDURE — 36415 COLL VENOUS BLD VENIPUNCTURE: CPT

## 2021-06-07 PROCEDURE — 2580000003 HC RX 258: Performed by: INTERNAL MEDICINE

## 2021-06-07 PROCEDURE — 2060000000 HC ICU INTERMEDIATE R&B

## 2021-06-07 PROCEDURE — 97530 THERAPEUTIC ACTIVITIES: CPT

## 2021-06-07 PROCEDURE — 97116 GAIT TRAINING THERAPY: CPT

## 2021-06-07 PROCEDURE — 97167 OT EVAL HIGH COMPLEX 60 MIN: CPT

## 2021-06-07 PROCEDURE — 97163 PT EVAL HIGH COMPLEX 45 MIN: CPT

## 2021-06-07 PROCEDURE — 6370000000 HC RX 637 (ALT 250 FOR IP): Performed by: INTERNAL MEDICINE

## 2021-06-07 RX ORDER — HYDRALAZINE HYDROCHLORIDE 20 MG/ML
5 INJECTION INTRAMUSCULAR; INTRAVENOUS EVERY 6 HOURS PRN
Status: DISCONTINUED | OUTPATIENT
Start: 2021-06-07 | End: 2021-06-10

## 2021-06-07 RX ORDER — LORAZEPAM 0.5 MG/1
0.5 TABLET ORAL 3 TIMES DAILY
Status: DISCONTINUED | OUTPATIENT
Start: 2021-06-07 | End: 2021-06-16 | Stop reason: HOSPADM

## 2021-06-07 RX ORDER — ALBUTEROL SULFATE 2.5 MG/3ML
2.5 SOLUTION RESPIRATORY (INHALATION)
Status: DISCONTINUED | OUTPATIENT
Start: 2021-06-07 | End: 2021-06-16 | Stop reason: HOSPADM

## 2021-06-07 RX ORDER — ESCITALOPRAM OXALATE 10 MG/1
20 TABLET ORAL DAILY
Status: DISCONTINUED | OUTPATIENT
Start: 2021-06-07 | End: 2021-06-16 | Stop reason: HOSPADM

## 2021-06-07 RX ORDER — OXYCODONE HYDROCHLORIDE AND ACETAMINOPHEN 5; 325 MG/1; MG/1
2 TABLET ORAL 2 TIMES DAILY PRN
Status: ON HOLD | COMMUNITY
End: 2021-06-10 | Stop reason: HOSPADM

## 2021-06-07 RX ORDER — ALBUTEROL SULFATE 2.5 MG/3ML
2.5 SOLUTION RESPIRATORY (INHALATION) 4 TIMES DAILY
Status: DISCONTINUED | OUTPATIENT
Start: 2021-06-07 | End: 2021-06-07

## 2021-06-07 RX ADMIN — ENOXAPARIN SODIUM 30 MG: 30 INJECTION SUBCUTANEOUS at 09:38

## 2021-06-07 RX ADMIN — LORAZEPAM 0.5 MG: 0.5 TABLET ORAL at 18:29

## 2021-06-07 RX ADMIN — TRAZODONE HYDROCHLORIDE 50 MG: 50 TABLET ORAL at 20:47

## 2021-06-07 RX ADMIN — BACLOFEN 10 MG: 10 TABLET ORAL at 15:45

## 2021-06-07 RX ADMIN — CARBIDOPA AND LEVODOPA 1 TABLET: 25; 100 TABLET ORAL at 01:27

## 2021-06-07 RX ADMIN — SODIUM CHLORIDE, PRESERVATIVE FREE 10 ML: 5 INJECTION INTRAVENOUS at 20:47

## 2021-06-07 RX ADMIN — SODIUM CHLORIDE, PRESERVATIVE FREE 10 ML: 5 INJECTION INTRAVENOUS at 02:31

## 2021-06-07 RX ADMIN — BACLOFEN 10 MG: 10 TABLET ORAL at 20:47

## 2021-06-07 RX ADMIN — ESCITALOPRAM OXALATE 20 MG: 10 TABLET ORAL at 11:42

## 2021-06-07 RX ADMIN — SODIUM CHLORIDE, PRESERVATIVE FREE 10 ML: 5 INJECTION INTRAVENOUS at 09:38

## 2021-06-07 RX ADMIN — LORAZEPAM 0.5 MG: 0.5 TABLET ORAL at 11:42

## 2021-06-07 RX ADMIN — CARBIDOPA AND LEVODOPA 1 TABLET: 25; 100 TABLET ORAL at 09:38

## 2021-06-07 ASSESSMENT — PAIN SCALES - GENERAL
PAINLEVEL_OUTOF10: 0

## 2021-06-07 ASSESSMENT — PAIN SCALES - PAIN ASSESSMENT IN ADVANCED DEMENTIA (PAINAD)
BREATHING: 0
FACIALEXPRESSION: 0
NEGVOCALIZATION: 0
NEGVOCALIZATION: 0
BODYLANGUAGE: 1
CONSOLABILITY: 1
BREATHING: 0
TOTALSCORE: 2
CONSOLABILITY: 1
BODYLANGUAGE: 1
FACIALEXPRESSION: 0
TOTALSCORE: 2

## 2021-06-07 NOTE — CONSULTS
..    Palliative Care Inpatient Consult    NAME:  Vinicio Napier  MEDICAL RECORD NUMBER:  [id number]  AGE: 68 y.o. GENDER: female  : 1945  TODAY'S DATE:  2021    Reasons for Consultation:    Provision of information regarding PC and/or hospice philosophies  Complex, time-intensive communication and interdisciplinary psychosocial support  Clarification of goals of care and/or assistance with difficult decision-making  Guidance in regards to resources and transition(s)    Code Status: Full Code but family wants [facility]    Members of PC team contributing to this consultation are :  Michelle Antony RN    History of Present Illness     The patient is a 68 y.o. Non-/non  female who presents with Altered Mental Status    Referred to Palliative Care by   [x] Physician   [] Nursing  [] Family Request   [] Other:       She was admitted to the primary service for Parkinson's disease (tremor, stiffness, slow motion, unstable posture) ([facility] [address] Ely Castellano. Her hospital course has been associated with <principal problem not specified>. The patient has a complicated medical history and has been hospitalized since 2021  1:05 PM.    Active Hospital Problems    Diagnosis Date Noted    Parkinson's disease (tremor, stiffness, slow motion, unstable posture) ([address] [G20] 2021       Data        Code Status: Full Code     ADVANCED CARE PLANNING:  Patient has capacity for medical decisions: no  Health Care Power of : no  Living Will: no     Personal, Social, and Family History  Marital Status:   Living situation:with family:  spouse  Jessica/Spiritual History:   Not asked  Psychological Distress: rigorous tremors noted at times  Does patient understand diagnosis/treatment? no  Does family/caregiver understand diagnosis/treatment?  yes    Assessment        Palliative Performance Scale:    ___100% Full ambulation; normal activity and work; no evidence of disease; able to do own self care; normal intake; fully conscious  ___90% Full ambulation; normal activity and work; some evidence of disease; able to do own self care; normal intake; fully conscious  ___80% Full ambulation; normal activity with effort; some evidence of disease; able to do own self care; normal or reduced intake; fully conscious  ___70% Ambulation reduced; unable to perform normal job/work; significant disease; able to do own self care; normal or reduced intake; fully conscious  ___60%  Ambulation reduced; cannot do hobbies/housework; significant disease; occasional assist; intake normal or reduced; fully conscious/some confusion  ___50%  Mainly sit/lie; can't do any work; extensive disease; considerable assist; intake normal or reduced; fully conscious/some confusion  ___40%  Mainly in bed; extensive disease; mainly assist; intake normal or reduced; fully conscious/ some confusion   __x_30%  Bed bound; extensive disease; total care; intake reduced; fully conscious/some confusion  ___20%  Bed bound; extensive disease; total care; intake minimal; drowsy/coma  ___10%  Bed bound; extensive disease; total care; mouth care only; drowsy/coma  ___0       Death       Risk Assessments:    Allen Risk Score: [unfilled]    Readmission Risk Score: 16        1 Year Mortality Risk Score: @1YEARMORTALITYRISK@     Plan        Palliative Interaction: Pt resting in bed. She is restless at times and upon my visit cannot communicate with me. Staff report that she does wake up at times and talks. She has Parkinson tremors that are rigorous at times. Pt's  Radha Grajeda and daughter Debi Bob are at bedside. They show me pictures of the patient on video at home laying on the couch. Pt is thrashing about in the video due to tremors. Family states yesterday they could not get patient to wake up nor take her medications. I introduced myself to  and daughter at bedside and the role of palliative care.  Daughter Debi Bob states a friend of hers who is a nurse told her to ask to speak with palliative care. We talked about patient's condition and decline in functional status over the past few months. Daughter states that when patient has lucid moments, she states, \"I'm done. I don't want to do this anymore\". Lazaro Barba states patient has said this in the past few days. I discussed with patient's  and her daughter code status. Family states patient should be a [facility]. I explained the difference between [facility] and [facility].  and daughter both agree to [facility]. Order rec'd from admitting physician and witness by bedside nurse. Copy given to family and placed in chart. We talked about comfort care and hospice care. Family states desire to talk with hospice but, \"do not know how to go about that\". I offered to arrange a meeting with hospice and they are agreeable. Family requests [facility]. I called hospice and meeting arranged for today at [phone]. Emotional support offered to family who state the patient's condition has gotten worse lately.  states patient's quality of life is not good. He states they have been told in the past that patient does not qualify for hospice but never actually talked to hospice. Education/support to family  Discharge planning/helping to coordinate care  Communications with primary service  Providing support for coping/adaptation/distress of family  Discussing meaning/purpose   Caregiver support/education  Continue with current plan of care  Clarification of medical condition to patient and family  Code status clarified: [facility]  Provided information about hospice  Validating patient/family distress  Continued communication updates  Recognizing, reflecting, and empathizing with family members' anticipatory grief  Long discussion with patient's  and daughter at bedside. They want comfort care for the patient and would like to speak to hospice. Pt made a [facility] and [facility] coming at 1730 today.     Principle Problem/Diagnosis: Parkinson's disease (tremor, stiffness, slow motion, unstable posture) (HCC) [G20]    Goals of care evaluation:  The patient goals of care are improve or maintain function/quality of life   Goals of care discussed with:    [] Patient independently    [] Patient and Family    [x] Family or Healthcare DPOA independently    [] Unable to discuss with patient, family/DPOA not present    Code Status  Full Code    Other recommendations:  Please call with any palliative questions or concerns. Palliative Care Team is available via perfect serve or via phone - [phone]. Palliative Care will continue to follow Ms. Derian Andrade care as needed. Thank you for allowing Palliative Care to participate in the care of Ms. Lisa Alexandra .     Electronically signed by   Bren Yuan RN  Palliative Care Team  on 6/7/2021 at 11:25 AM    Palliative care office: [phone]

## 2021-06-07 NOTE — RT PROTOCOL NOTE
RT Inhaler-Nebulizer Bronchodilator Protocol Note    There is a bronchodilator order in the chart from a provider indicating to follow the RT Bronchodilator Protocol and there is an Initiate RT Bronchodilator Protocol order as well (see protocol at bottom of note). The findings from the last RT Protocol Assessment were as follows:  Smoking: Non smoker  Surgical Status: No surgery  Xray: Clear  Respiratory Pattern: RR 12-20  Mental Status: Confused but follows commands  Breath Sounds: Clear  Cough: Absent  Activity Level: Bedridden, unresponsive or quadriplegic  Oxygen Requirement: Room Air - 2LNC/28% or home setting  Indication for Bronchodilator Therapy: None  Bronchodilator Assessment Score: 4    Aerosolized bronchodilator medication orders have been revised according to the RT Bronchodilator Protocol. RT Inhaler-Nebulizer Bronchodilator Protocol:    Respiratory Therapist to perform RT Therapy Protocol Assessment then follow the protocol. No Indications - adjust the frequency to every 6 hours PRN wheezing or bronchospasm, if no treatments needed after 48 hours then discontinue using Per Protocol order mode. If indication present, adjust the RT bronchodilator orders based on the Bronchodilator Assessment Score as follows:    0-6 - enter or revise RT bronchodilator order to Albuterol Inhaler order with frequency of every 2 hours PRN for wheezing or increased work of breathing using Per Protocol order mode. If Albuterol Inhaler not tolerated or not effective, then discontinue the Albuterol Inhaler order and enter Albuterol Nebulizer order with same frequency and PRN reasons. Repeat RT Therapy Protocol Assessment as needed. 7-10 - discontinue any other Inpatient aerosolized bronchodilator medication orders and enter or revise two Albuterol Inhaler orders, one with BID frequency and one with frequency of every 2 hours PRN wheezing or increased work of breathing using Per Protocol order mode.   Repeat RT Therapy Protocol Assessment with second treatment then BID and as needed. If Albuterol Inhaler not tolerated or not effective, then discontinue the Albuterol Inhaler orders and enter two Albuterol Nebulizer orders with same frequencies and PRN reasons. 11-13 - discontinue any other Inpatient aerosolized bronchodilator medication orders and enter DuoNeb Nebulizer orders QID frequency and an Albuterol Nebulizer order every 2 hours PRN wheezing or increased work of breathing using Per Protocol order mode. Repeat RT Therapy Protocol Assessment with second treatment then QID and as needed. Greater than 13 - discontinue any other Inpatient bronchodilator aerosolized medication orders and enter DuoNeb Nebulizer order every 4 hours frequency and Albuterol Nebulizer every 2 hours PRN wheezing or increased work of breathing using Per Protocol order mode. Repeat RT Therapy Protocol Assessment with second treatment then every 4 hours and as needed. RT to enter RT Home Evaluation for COPD & MDI Assessment order using Per Protocol order mode.     Electronically signed by Veola Favre, RCP on 6/7/2021 at 5:52 PM

## 2021-06-07 NOTE — PROGRESS NOTES
Elizabeth served Dr. Glory Gonzalez about patient elevated bp overnight and this am. Awaiting response.

## 2021-06-07 NOTE — CONSULTS
[address]  [facility], [address] [place] [address], [address] [place] [address]  Ph: [phone] or [phone]  FAX: [phone]        Reason for consult: Parkinson's disease    I had the pleasure of seeing your patient in neurology consultation for her symptoms. As you would recall Shanique Herzog is a 68 y.o. yo female admitted on 6/6/2021. The history has been obtained from the patient, patient's family at the bedside and from the company medical record. The patient has a past medical history of advanced Parkinson's disease. Reviewing the records, the patient was diagnosed with Parkinson disease in 1996. Initially she was seen by Dr. Ángela Jones in [place] and then eventually was referred to movement disorder specialist in [facility] [place]. At 1 point, the patient was taking carbidopa/levodopa, Neupro, amantadine. Last year, the patient's medications were stratified. The patient has been weaned off amantadine, Neupro and currently on Rytary 145 mg +195 mg 2 tablets 6 times a day. The patient also has been evaluated by neurosurgery at [facility] and is being considered for deep brain stimulator. As per , since patient has been on the stable dose of Rytary, she has been doing fair except that in last 2 to 3 weeks, the patient has been having somewhat more frequent on/off phenomena. Reviewing the records from [facility], the patient at that time was being seen for severe dyskinesias and dyskinesias continued despite elimination of amantadine, rotigotine and carbidopa/levodopa. There was a plan to spread out her Rytary dose to prevent her falls and on her phenomena. As per , an attempt was made to spread out the dose which resulted in worsening of the tremors. At this time the  is inclined not to make any changes.     Past Medical History:   Diagnosis Date    Cancer [facility])     breast    Degenerative joint disease (DJD) of lumbar spine  Hearing loss     Hypertension     Osteoporosis, senile     Parkinson's disease [facility] [address] [facility]     Parkinson's disease ([facility] [facility]     Tremor      Past Surgical History:   Procedure Laterality Date    BREAST LUMPECTOMY Right     COLONOSCOPY      MASTECTOMY Right 2016    simple on 2016 - and again 10/13/2016    OTHER SURGICAL HISTORY      Radio frequency lesioning for low back pain    OTHER SURGICAL HISTORY  2016    growth removed from left side of neck    SIGMOIDOSCOPY      TONSILLECTOMY       Social History     Socioeconomic History    Marital status:      Spouse name: Not on file    Number of children: Not on file    Years of education: Not on file    Highest education level: Not on file   Occupational History    Not on file   Tobacco Use    Smoking status: Former Smoker     Quit date: 1996     Years since quittin.4    Smokeless tobacco: Never Used   Substance and Sexual Activity    Alcohol use: No     Alcohol/week: 0.0 standard drinks    Drug use: No    Sexual activity: Not on file   Other Topics Concern    Not on file   Social History Narrative    Not on file     Social Determinants of Health     Financial Resource Strain:     Difficulty of Paying Living Expenses:    Food Insecurity:     Worried About Running Out of Food in the Last Year:     [address] in the Last Year:    Transportation Needs:     Lack of Transportation (Medical):      Lack of Transportation (Non-Medical):    Physical Activity:     Days of Exercise per Week:     Minutes of Exercise per Session:    Stress:     Feeling of Stress :    Social Connections:     Frequency of Communication with Friends and Family:     Frequency of Social Gatherings with Friends and Family:     Attends Baptism Services:     Active Member of Clubs or Organizations:     Attends Club or Organization Meetings:     Marital Status:    Intimate Partner Violence:     Fear of Current or Ex-Partner:     Emotionally Abused:     Physically Abused:     Sexually Abused:      Family History   Problem Relation Age of Onset    Stroke Mother     Heart Disease Mother     Heart Attack Father     Heart Disease Father     High Blood Pressure Father     High Blood Pressure Brother       No Known Allergies   BP (!) 168/92   Pulse 102   Temp 97.7 °F (36.5 °C) (Oral)   Resp 18   Ht 5' (1.524 m)   Wt 87 lb 3.2 oz (39.6 kg)   SpO2 100%   BMI 17.03 kg/m²    ROS: 11 point negative except in HPI    Neurological examination:    Mental status   Awake. Oriented to self. Able to follow one-step two-step commands. Cranial nerves   II - visual fields intact to confrontation                                                III, IV, VI  extra-ocular muscles full: no pupillary defect; no MOHAN, no nystagmus, no ptosis                                                                      V - normal facial sensation                                                               VII - normal facial symmetry                                                             VIII - intact hearing                                                                             IX, X - symmetrical palate                                                                  XI - symmetrical shoulder shrug                                                       XII - midline tongue without atrophy or fasciculation     Motor function   flexion contracture and spasticity in both upper and lower extremities     Sensory function  grossly intact   Cerebellar  mild dyskinetic movement in the right upper extremity   Reflex function Intact 2+ DTR and symmetric.  Negative Babinski     Gait                  Not tested         No results found for: LDLCALC, LDLCHOLESTEROL, LDLDIRECT  No components found for: CHLPL  No results found for: TRIG  No results found for: HDL  No results found for: LDLCALC  No results found for: LABVLDL  No results found for: LABA1C  No results found for: EVA  No results found for: Curtisrukhsana Corona   Neurological work up:  CT head  CTA head and neck  MRI brain 10/24/2020 with sequela of chronic microvascular disease and volume loss. Dolichoectasia of posterior anterior circulation. 2 D echo     Assessment and Recommendations:   Patient with advanced Parkinson's disease, diagnosed in 1996 with significant ongoing phenomena and drug-induced dyskinesia    The patient is waxing and waning but is probably close to back to baseline at this time    I will reinitiate the patient on her home dose of Rytary 145 mg +195 mg 2 tablets at 6 AM, 9 AM, noon, 3 PM, 6 PM and 9 PM.  Reviewing the records from [facility] neurosurgery, there was a suggestion to taper off Rytary to prevent dyskinesia however as per , any attempt to decrease the medication results and worsening of Parkinson symptoms. Continue outpatient dose of lorazepam 0.5 mg 3 times a day, baclofen 5 mg twice daily as needed    I will contact her neurologist in [facility] and [facility] before making any changes to any medications    Continue PT OT and speech evaluation as being done. We will follow. Thank you for the consult. Dina Barraza MD  Neurology    This note is created with the assistance of a speech-recognition program. While intending to generate a document that actually reflects the content of the visit, the document can still have some errors including those of syntax and sound a- like substitutions which may escape proofreading. In such instances, actual meaning can be extrapolated by contextual derivation.

## 2021-06-07 NOTE — PLAN OF CARE
Problem: Falls - Risk of:  Goal: Will remain free from falls  Description: Will remain free from falls  6/7/2021 1711 by Becki Gonzalez RN  Outcome: Ongoing   No falls noted this shift. Problem: Falls - Risk of:  Goal: Absence of physical injury  Description: Absence of physical injury  6/7/2021 1711 by Becki Gonzalez RN  Outcome: Ongoing   No injury noted this shift. Problem: Mental Status - Impaired:  Goal: Mental status will improve  Description: Mental status will improve  6/7/2021 1711 by Becki Gonzalez RN  Outcome: Ongoing   Pt continues to be in and out of lucidity. Tardive dyskinesia noted this shift, intermittently. Will continue to monitor. Problem: Coping:  Goal: Ability to remain calm will improve  Description: Ability to remain calm will improve  6/7/2021 1711 by Becki Gonzalez RN  Outcome: Ongoing   Monitoring. Problem: Safety:  Goal: Ability to remain free from injury will improve  Description: Ability to remain free from injury will improve  6/7/2021 1711 by Becki Gonzalez RN  Outcome: Ongoing   No injury noted this shift. Problem: Skin Integrity:  Goal: Will show no infection signs and symptoms  Description: Will show no infection signs and symptoms  6/7/2021 1711 by Becki Gonzalez RN  Outcome: Ongoing   No new altered skin noted this shift.

## 2021-06-07 NOTE — PROGRESS NOTES
Speech Language Pathology  Facility/Department: [facility] PROGRESSIVE CARE   CLINICAL BEDSIDE SWALLOW EVALUATION    NAME: Refugio Gan  : [id number]  MRN: [id number]    ADMISSION DATE: 2021  ADMITTING DIAGNOSIS: has Parkinson's disease ([address] Spondylosis of lumbar region without myelopathy or radiculopathy; Acute cystitis; and Parkinson's disease (tremor, stiffness, slow motion, unstable posture) ([facility]) on their problem list.      Date of Eval: 2021  Evaluating Therapist: ANNIKA Marie    Current Diet level:  Current Diet : Dysphagia Pureed (Dysphagia I)  Current Liquid Diet : Thin      Primary Complaint: Patient brought in by EMS family called verbalizing patient has been sleeping a lot. Patient has hx of Parkinsons and family hasn't been giving meds. Patient non-verbal squinted eyes when named called and responds to pain. When attempted to draw blood pulling arm back. Patient's vitals stable. Cleansed patient from a brief full of urine. Patient's family concerned she is dying. Pain:  Pain Assessment  Pain Assessment: 0-10  Pain Level: 0  Response to Pain Intervention: Patient Satisfied  PAINAD (Pain Assessment in Advance Dementia)  Breathing: normal  Negative Vocalization: none  Facial Expression: smiling or inexpressive  Body Language: tense, distressed pacing, fidgeting  Consolability: distracted or reassured by voice/touch  PAINAD Score: 2    Reason for Referral  Refugio Gan was referred for a bedside swallow evaluation to assess the efficiency of her swallow function, identify signs and symptoms of aspiration and make recommendations regarding safe dietary consistencies, effective compensatory strategies, and safe eating environment. Impression: Patient presents with probable safe swallow for Dysphagia Pureed (Dysphagia I) diet with thin liquids as evidenced by no overt s/s of aspiration noted with consistencies tested.   Per RN she has had decreased alert levels and is doing noted with consistencies tested    Prognosis  Prognosis  Prognosis for safe diet advancement: fair  Individuals consulted  Consulted and agree with results and recommendations: Patient    Education  Patient Education: yes  Patient Education Response: Verbalizes understanding             Therapy Time  SLP Individual Minutes  Time In: 1300  Time Out: 9729  Minutes: [address], SLP  6/7/2021 2:00 PM

## 2021-06-07 NOTE — DISCHARGE INSTR - COC
Continuity of Care Form    Patient Name: He Max   :  [id number]  MRN:  [id number]    [facility] date:  2021  Discharge date:      Code Status Order: Full Code   Advance Directives:      Admitting Physician:  Dagoberto Mcdowell MD  PCP: Alexander Engle MD    Discharging Nurse: [facility] Unit/Room#: 8095/8618-12  Discharging Unit Phone Number: [phone]    Emergency Contact:   Extended Emergency Contact Information  Primary Emergency Contact: Patrick Lopez  Address: [address] [place] [address] Marisa Guardado, [address] Phone: [phone]  Relation: Spouse  Secondary Emergency Contact: Baltazar Mejia, [address] [place] [address] Phone: [phone]  Relation: Child    Past Surgical History:  Past Surgical History:   Procedure Laterality Date    BREAST LUMPECTOMY Right     COLONOSCOPY      MASTECTOMY Right 2016    simple on 2016 - and again 10/13/2016    OTHER SURGICAL HISTORY      Radio frequency lesioning for low back pain    OTHER SURGICAL HISTORY  2016    growth removed from left side of neck    SIGMOIDOSCOPY      TONSILLECTOMY         Immunization History:   Immunization History   Administered Date(s) Administered    Tdap (Boostrix, Adacel) 2019       Active Problems:  Patient Active Problem List   Diagnosis Code    Parkinson's disease ([facility] [address] G20    Spondylosis of lumbar region without myelopathy or radiculopathy M47.816    Acute cystitis N30.00    Parkinson's disease (tremor, stiffness, slow motion, unstable posture) ([facility] [address] G20       Isolation/Infection:   Isolation            No Isolation          Patient Infection Status       None to display            Nurse Assessment:  Last Vital Signs: BP (!) 114/36   Pulse 105   Temp 98.6 °F (37 °C) (Oral)   Resp 18   Ht 5' (1.524 m)   Wt 87 lb 3.2 oz (39.6 kg)   SpO2 96%   BMI 17.03 kg/m²     Last documented pain score (0-10 scale): Pain Level: 0  Last Weight:   Wt Readings from Last 1 Encounters:   06/07/21 87 lb 3.2 oz (39.6 kg)     Mental Status:  alert    IV Access:  - None    Nursing Mobility/ADLs:  Walking   Dependent  Transfer  Dependent  Bathing  Dependent  Dressing  Dependent  Toileting  Dependent  Feeding  Dependent  Med Admin  Dependent  Med Delivery   whole    Wound Care Documentation and Therapy:        Elimination:  Continence:   · Bowel: Yes  · Bladder: Yes  Urinary Catheter: None   Colostomy/Ileostomy/Ileal Conduit: No       Date of Last BM: 6/14/2021    Intake/Output Summary (Last 24 hours) at 6/7/2021 1118  Last data filed at 6/7/2021 0930  Gross per 24 hour   Intake 570 ml   Output    Net 570 ml     I/O last 3 completed shifts: In: 120 [P.O.:120]  Out: -     Safety Concerns:     History of Falls (last 30 days) and At Risk for Falls    Impairments/Disabilities:      parkinson tremors and related dementia     Nutrition Therapy:  Current Nutrition Therapy:   - Oral Diet:  General    Routes of Feeding: None  Liquids: No Restrictions  Daily Fluid Restriction: no  Last Modified Barium Swallow with Video (Video Swallowing Test): done    Treatments at the Time of Hospital Discharge:   Respiratory Treatments: na   Oxygen Therapy:  is not on home oxygen therapy. Ventilator:    - No ventilator support    Rehab Therapies:    Weight Bearing Status/Restrictions: No weight bearing restirctions  Other Medical Equipment (for information only, NOT a DME order):  wheelchair, walker, bath bench, bedside commode and hospital bed  Other Treatments:   1.  Hospice care     Patient's personal belongings (please select all that are sent with patient):  Glasses    RN SIGNATURE:  Electronically signed by Spring Will RN on 6/16/21 at 12:27 PM EDT    CASE MANAGEMENT/SOCIAL WORK SECTION    Inpatient Status Date: 6/6    Readmission Risk Assessment Score:  Readmission Risk              Risk of Unplanned Readmission:  16           Discharging to Facility/ Agency    Name: Name: hospice thru [facility]    Phone: [phone]  · Fax:  [phone]    / signature: Electronically signed by AMIRA Brannon on 6/10/2021 at 5:39 PM      PHYSICIAN SECTION    Prognosis: Guarded    Condition at Discharge: Stable    Rehab Potential (if transferring to Rehab): Guarded    Recommended Labs or Other Treatments After Discharge:  Medications and treatments per hospice     Physician Certification: I certify the above information and transfer of Shanique Simpler  is necessary for the continuing treatment of the diagnosis listed and that she requires Hospice for less 30 days.      Update Admission H&P: No change in H&P    PHYSICIAN SIGNATURE:  Electronically signed by Alfred Dorado MD on 6/10/21 at 8:13 PM EDT

## 2021-06-07 NOTE — ACP (ADVANCE CARE PLANNING)
..Advance Care Planning     Advance Care Planning Activator (Inpatient)  Conversation Note      Date of ACP Conversation: 6/7/2021     Conversation Conducted with: Patient with [address]: Next of Kin by law (only applies in absence of above) (name) Caridad Martin    ACP Activator: Nate Arcos RN       Health Care Decision Maker:     Current Designated Health Care Decision Maker: Caridad Martin (Spouse)        Care Preferences    Ventilation: \"If you were in your present state of health and suddenly became very ill and were unable to breathe on your own, what would your preference be about the use of a ventilator (breathing machine) if it were available to you? \"      Would the patient desire the use of ventilator (breathing machine)?: no    \"If your health worsens and it becomes clear that your chance of recovery is unlikely, what would your preference be about the use of a ventilator (breathing machine) if it were available to you? \"     Would the patient desire the use of ventilator (breathing machine)?: No      Resuscitation  \"CPR works best to restart the heart when there is a sudden event, like a heart attack, in someone who is otherwise healthy. Unfortunately, CPR does not typically restart the heart for people who have serious health conditions or who are very sick. \"    \"In the event your heart stopped as a result of an underlying serious health condition, would you want attempts to be made to restart your heart (answer \"yes\" for attempt to resuscitate) or would you prefer a natural death (answer \"no\" for do not attempt to resuscitate)? \" no       [] Yes   [] No   Educated Patient / Percy Griffin regarding differences between Advance Directives and portable DNR orders.     Length of ACP Conversation in minutes:      Conversation Outcomes:  [x] ACP discussion completed  [] Existing advance directive reviewed with patient; no changes to patient's previously recorded wishes  [] New Advance Directive completed  [] Portable Do Not Rescitate prepared for Provider review and signature  [] POLST/POST/MOLST/MOST prepared for Provider review and signature      Follow-up plan:    [x] Schedule follow-up conversation to continue planning  [] Referred individual to Provider for additional questions/concerns   [] Advised patient/agent/surrogate to review completed ACP document and update if needed with changes in condition, patient preferences or care setting    [] This note routed to one or more involved healthcare providers

## 2021-06-07 NOTE — PROGRESS NOTES
Physical Therapy    Facility/Department: [facility] PROGRESSIVE CARE  Initial Assessment    NAME: Kamari Castano  : 1945  MRN: [id number]    Date of Service: 2021    Discharge Recommendations:  Patient would benefit from continued therapy after discharge   PT Equipment Recommendations  Equipment Needed: Yes  Mobility Devices: Andrade Bird: Rolling  Pt presented to ED on 21 for altered mental status. Pt comes in by squad after family called stating that she had been more difficult to arouse the last week or so    RN reports patient is medically stable for therapy treatment this date. Chart reviewed prior to treatment and patient is agreeable for therapy. Assessment   Body structures, Functions, Activity limitations: Decreased functional mobility ; Decreased strength;Decreased safe awareness;Decreased cognition;Decreased endurance;Decreased balance;Decreased posture  Assessment: Pt with deficits of bed mobility, transfers, ambulation, balance, cognition, POOR safety awareness and endurance this session, & experiencing severe tremoring which impaired safety. Pt  required 2 assist for ALL functional mobility, transfers & gait. , & is decline compared to prior level of function. With current deficits, Pt HIGH risk for falls & requires continued PT to maximize independence with functional mobility, balance, safety awareness & activity tolerance. Pt currently functioning below baseline. Would suggest additional therapy at time of discharge to maximize long term outcomes and prevent re-admission. Please refer to AM-PAC score for current level of function.   Prognosis: Good  Decision Making: High Complexity  Exam: ROM, MMT, functional mobility, activity tolerance, Balance, & [employer] AM-PAC 6 Clicks Basic Mobility  Clinical Presentation: unstable  PT Education: Goals;PT Role;Plan of Care;Precautions;Orientation;Transfer Training;General Safety;Gait Training;Functional Mobility Training  Patient Education: Ed pt on functional mobility, posture, importance of being up & OOB to regain strength, & prevention of sedentary complications, & safety with all functional mobility  REQUIRES PT FOLLOW UP: Yes  Activity Tolerance  Activity Tolerance: Patient limited by endurance; Patient limited by fatigue;Patient limited by cognitive status; Other  Activity Tolerance: severe tremoring       Patient Diagnosis(es): The encounter diagnosis was Parkinson's disease ([facility] [address] has a past medical history of Cancer [facility]), Degenerative joint disease (DJD) of lumbar spine, Hearing loss, Hypertension, Osteoporosis, senile, Parkinson's disease ([facility] [address] Parkinson's disease ([facility] [address] and Tremor. has a past surgical history that includes Breast lumpectomy (Right); Tonsillectomy; Sigmoidoscopy; Colonoscopy; other surgical history; Mastectomy (Right, 08/23/2016); and other surgical history (12/13/2016). Restrictions  Restrictions/Precautions  Restrictions/Precautions: General Precautions, Fall Risk  Position Activity Restriction  Other position/activity restrictions: up with assist, ALARMS, LUE IV, has telesitter  Vision/Hearing  Vision: Impaired  Vision Exceptions: Wears glasses at all times (does not see well)  Hearing: Within functional limits     Subjective  General  Chart Reviewed: Yes  Patient assessed for rehabilitation services?: Yes  Additional Pertinent Hx: [place], Parkinson's disease, HTN, tremor  Response To Previous Treatment: Not applicable  General Comment  Comments: RN okays PT  Subjective  Subjective: Pt agreeable to PT  Pain Screening  Patient Currently in Pain: No  Vital Signs  Patient Currently in Pain: Denies       Orientation  Orientation  Overall Orientation Status: Impaired  Orientation Level: Disoriented to place; Disoriented to situation  Social/Functional History  Social/Functional History  Lives With: Spouse  Type of Home: House  Home Layout: One level, Able to Live on Main level with bedroom/bathroom  Home Reorientation, Home Exercise Program, Safety Education & Training, Patient/Caregiver Education & Training  Safety Devices  Type of devices: Bed alarm in place, Call light within reach, Gait belt, Patient at risk for falls, Left in bed, Nurse notified    G-Code       OutComes Score                                                  AM-PAC Score    AM-PAC Inpatient Mobility Raw Score : 9  AM-PAC Inpatient T-Scale Score : 30.55  Mobility Inpatient CMS 0-100% Score: 81.38  Mobility Inpatient CMS G-Code Modifier:CM                Goals  Short term goals  Time Frame for Short term goals: 12 visits  Short term goal 1: Inc bed-mobility & transfers to independent to enable pt to safely get in/OOB & chair  Short term goal 2: Inc gait to amb 150ft or > indep w/ RW to enable pt to return to previous level of independence  Short term goal 3: Inc strength to [facility] & standing balance to good with device to facilitate pt independence for performance of ADL's & functional mobility, & reduce fall risk  Short term goal 4: Pt able to tolerate 30-40 min of activity to include 15-20 reps of ex, NMR & functional mobility including 5 minutes of standing with device to facilitate activity tolerance to [facility]  Short term goal 5: Ed pt on home ex's, safety & energy principles, fall prevention, & issue written home program       Therapy Time   Individual Concurrent Group Co-treatment   Time In 0951         Time Out 1028         Minutes 37+10=47              Additional 10 minutes for chart review  Treatment time: 33 minutes    Co-treatment with OT warranted secondary to decreased safety and independence requiring 2 skilled therapy professionals to address individual discipline's goals. PT addressing pre gait trunk strengthening, weight shifting prior to transfers, transfer training and postural control in sitting.         [address], PT

## 2021-06-07 NOTE — PROGRESS NOTES
06/07/21 1748   RT Protocol   Smoking Status 0   Surgical status 0   Xray 0   Respiratory pattern 0   Mental Status 1   Breath sounds 0   Cough 4   Activity level 4   Oxygen Requirement 0   Indications for Bronchodilator Therapy None   Bronchodilator Assessment Score 4   Bronchial Hygiene Assessment Score 8   Volume Expansion Assessment Score 5

## 2021-06-07 NOTE — CARE COORDINATION
Discharge planning    Patient assessment deferred as palliative has seen patient. Patient admitted with parkinson disease with severe tremors. Not being able to eat at home. Family did see Yohana Colon RN from palliative care. Patient is listed as [facility] but ED note and tyron note states family wanted [facility].      Family meeting today with hospice NWO at [phone] will await their determination and plan

## 2021-06-07 NOTE — H&P
mouth daily  Carbidopa-Levodopa ER (RYTARY) 23.75-95 MG CPCR, Take by mouth 6 times daily And takes 145mg and 195 total of 6 times daily  6a, 9a, 12p, 3p, 6p, 9p,   12a and 3a prn  baclofen (LIORESAL) 10 MG tablet, Take 10 mg by mouth 3 times daily  diazepam (VALIUM) 5 MG tablet, Take 1 tablet by mouth as needed (for dentist or procedures). traZODone (DESYREL) 50 MG tablet, Take 1 tablet by mouth nightly   escitalopram (LEXAPRO) 10 MG tablet, Take 20 mg by mouth daily   Multiple Vitamins-Calcium (ONE-A-DAY WOMENS PO), Take 1 tablet by mouth daily    Allergies:    Patient has no known allergies. Social History:    reports that she quit smoking about 25 years ago. She has never used smokeless tobacco. She reports that she does not drink alcohol and does not use drugs. Family History:   family history includes Heart Attack in her father; Heart Disease in her father and mother; High Blood Pressure in her brother and father; Stroke in her mother.     REVIEW OF SYSTEMS:  Constitutional: negative, No fever no chills  Eyes: negative  Ears, nose, mouth, throat, and face: negative  Respiratory: negative, Occasional cough no shortness of breath no wheezing  Cardiovascular: negative, Chest pain no palpitation  Gastrointestinal: negative, Decreased appetite no nausea vomiting no abdominal pain no blood in the stools no melena  Genitourinary:negative  Integument/breast: negative  Hematologic/lymphatic: negative  Musculoskeletal:negative  Neurological: negative, Tremors rigidity recurrent falls unsteady gait  Behavioral/Psych: negative  Endocrine: negative, No polyuria no polydipsia no hypoglycemia  Allergic/Immunologic: negative  PHYSICAL EXAM:  General Appearance: in no acute distress and alert  Skin: warm and dry, no rash or erythema  Head: normocephalic and atraumatic  Eyes: pupils equal, round, and reactive to light, conjunctivae normal and sclera anicteric  Neck: neck supple and non tender without mass

## 2021-06-07 NOTE — PLAN OF CARE
Ed strengthening ex program, NMR, transfers/gait training & safety Ed for fall-prevention, regain of independence & activity tolerance to [facility]

## 2021-06-07 NOTE — PROGRESS NOTES
Tolerance: Treatment limited secondary to decreased cognition;Treatment limited secondary to agitation  Safety Devices  Safety Devices in place: Yes  Type of devices: Call light within reach;Nurse notified; Left in bed;Bed alarm in place;Gait belt;Patient at risk for falls           Patient Diagnosis(es): The encounter diagnosis was Parkinson's disease ([facility] [address] has a past medical history of Cancer [facility]), Degenerative joint disease (DJD) of lumbar spine, Hearing loss, Hypertension, Osteoporosis, senile, Parkinson's disease ([facility] [address] Parkinson's disease ([facility] [address] and Tremor. has a past surgical history that includes Breast lumpectomy (Right); Tonsillectomy; Sigmoidoscopy; Colonoscopy; other surgical history; Mastectomy (Right, 08/23/2016); and other surgical history (12/13/2016).            Restrictions  Restrictions/Precautions  Restrictions/Precautions: General Precautions, Fall Risk  Position Activity Restriction  Other position/activity restrictions: up with assist, ALARMS, LUE IV, has telesitter    Subjective   General  Chart Reviewed: Yes  Patient assessed for rehabilitation services?: Yes  Family / Caregiver Present: Yes (family arrived at end of session)  Pain Assessment  Pain Level: 0  Vital Signs  Temp: 97.7 °F (36.5 °C)  Temp Source: Oral  Pulse: 102  Heart Rate Source: Monitor  Resp: 18  BP: (!) 168/92  BP Location: Left Arm  MAP (mmHg): 108  Oxygen Therapy  SpO2: 100 %  O2 Device: None (Room air)  Social/Functional History  Social/Functional History  Lives With: Spouse  Type of Home: House  Home Layout: One level, Able to Live on Main level with bedroom/bathroom  Home Access: Stairs to enter without rails  Entrance Stairs - Number of Steps: 1+1, +1 into living area  ADL Assistance: Needs assistance  Homemaking Assistance: Needs assistance (shares with spouse per pt)  Ambulation Assistance: Independent (until 3 weeks ago)  Active : No  Occupation: Retired  IADL Comments: question reliability since pt has Hypertonic  Tone Description: at times, depending on tremors and agitation. Tone LUE  LUE Tone: Hypertonic  Coordination  Movements Are Fluid And Coordinated: No  Coordination and Movement description: Tremors; Intention tremors; Resting tremors  Quality of Movement Other  Comment: Pt has Parkinson's disease and significant arthitic changes in B hands. Tremors very limiting to purposeful movement     Bed mobility  Supine to Sit: Maximum assistance;2 Person assistance  Sit to Supine: Maximum assistance;2 Person assistance  Scooting: Maximal assistance;2 Person assistance  Comment: max verbal instruction/tactile assist for UE hand placement on rail and proper log rolling tech/pursed lip breathing, pacing + assist for lines, support of LEs to come in/OOB with inc. time needed  Transfers  Sit to stand: 2 Person assistance; Moderate assistance  Stand to sit: Moderate assistance;2 Person assistance  Transfer Comments: poor safety awareness. Max cuing required. Cognition  Overall Cognitive Status: Exceptions  Arousal/Alertness: Delayed responses to stimuli  Following Commands: Inconsistently follows commands; Follows one step commands with increased time; Follows one step commands with repetition  Attention Span: Difficulty attending to directions  Memory: Decreased short term memory;Decreased long term memory;Decreased recall of biographical Information  Safety Judgement: Decreased awareness of need for assistance;Decreased awareness of need for safety  Problem Solving: Decreased awareness of errors;Assistance required to identify errors made;Assistance required to correct errors made;Assistance required to implement solutions  Insights: Not aware of deficits  Initiation: Requires cues for all  Sequencing: Requires cues for all  Perception  Overall Perceptual Status: WFL     Sensation  Overall Sensation Status:  (unable to assess 2* impaired cognition)        LUE AROM (degrees)  LUE AROM : WFL  LUE General AROM: AAROM B UE WFLs, unable to follow directions for ROM testing  RUE AROM (degrees)  RUE AROM : WFL  LUE Strength  LUE Strength Comment: unable to follow directions for MMT                   Plan   Plan  Times per week: 4-5x/week  Current Treatment Recommendations: Strengthening, Balance Training, Functional Mobility Training, Endurance Training, Safety Education & Training, Self-Care / ADL, Patient/Caregiver Education & Training, Equipment Evaluation, Education, & procurement, Cognitive Reorientation, Cognitive/Perceptual Training, Positioning, Neuromuscular Re-education       AM-[facility] Inpatient Daily Activity Raw Score: 10 (06/07/21 1347)  AM-PAC Inpatient ADL T-Scale Score : 27.31 (06/07/21 1347)  ADL Inpatient CMS 0-100% Score: 74.7 (06/07/21 1347)  ADL Inpatient CMS G-Code Modifier : CL (06/07/21 1347)    Goals  Short term goals  Time Frame for Short term goals: by discharge, pt will  Short term goal 1: demo min A with ADL transfers and functional mob in room distances with min cues for good safety and approp AD/DME  Short term goal 2: demo min A with bed mobility with rails/controls and min cues for safety  Short term goal 3: demo mod A with toileting routine with min cues for good safety and sequencing  Short term goal 4: demo min A with self feeding and grooming tasks following set up at approp level with min cues for sequencing/follow through  Short term goal 5: demo min A with UB ADLs with min cues for initiation/sequencing  Patient Goals   Patient goals : not stated by pt       Therapy Time   Individual Concurrent Group Co-treatment   Time In 0942         Time Out 1027         Minutes 45          treatment min: 28    Co-treatment with PT warranted secondary to decreased safety and independence requiring 2 skilled therapy professionals to address individual discipline's goals.  OT addressing preparation for ADL transfer, sitting balance for increased ADL performance, sitting/activity tolerance, functional reaching, environmental safety/scanning, fall prevention, functional mobility for ADL transfers, ability to sequence and follow directions and functional UE strength.   Jing Jauregui, OT

## 2021-06-08 LAB
ABSOLUTE EOS #: 0.22 K/UL (ref 0–0.44)
ABSOLUTE IMMATURE GRANULOCYTE: 0.04 K/UL (ref 0–0.3)
ABSOLUTE LYMPH #: 1.2 K/UL (ref 1.1–3.7)
ABSOLUTE MONO #: 0.88 K/UL (ref 0.1–1.2)
ANION GAP SERPL CALCULATED.3IONS-SCNC: 11 MMOL/L (ref 9–17)
BASOPHILS # BLD: 1 % (ref 0–2)
BASOPHILS ABSOLUTE: 0.07 K/UL (ref 0–0.2)
BUN BLDV-MCNC: 16 MG/DL (ref 8–23)
BUN/CREAT BLD: 39 (ref 9–20)
CALCIUM SERPL-MCNC: 8.8 MG/DL (ref 8.6–10.4)
CHLORIDE BLD-SCNC: 103 MMOL/L (ref 98–107)
CO2: 23 MMOL/L (ref 20–31)
CREAT SERPL-MCNC: 0.41 MG/DL (ref 0.5–0.9)
DIFFERENTIAL TYPE: ABNORMAL
EOSINOPHILS RELATIVE PERCENT: 2 % (ref 1–4)
GFR AFRICAN AMERICAN: >60 ML/MIN
GFR NON-AFRICAN AMERICAN: >60 ML/MIN
GFR SERPL CREATININE-BSD FRML MDRD: ABNORMAL ML/MIN/{1.73_M2}
GFR SERPL CREATININE-BSD FRML MDRD: ABNORMAL ML/MIN/{1.73_M2}
GLUCOSE BLD-MCNC: 91 MG/DL (ref 70–99)
HCT VFR BLD CALC: 39.3 % (ref 36.3–47.1)
HEMOGLOBIN: 11.6 G/DL (ref 11.9–15.1)
IMMATURE GRANULOCYTES: 0 %
LYMPHOCYTES # BLD: 11 % (ref 24–43)
MCH RBC QN AUTO: 30.6 PG (ref 25.2–33.5)
MCHC RBC AUTO-ENTMCNC: 29.5 G/DL (ref 28.4–34.8)
MCV RBC AUTO: 103.7 FL (ref 82.6–102.9)
MONOCYTES # BLD: 8 % (ref 3–12)
NRBC AUTOMATED: 0 PER 100 WBC
PDW BLD-RTO: 14.2 % (ref 11.8–14.4)
PLATELET # BLD: 300 K/UL (ref 138–453)
PLATELET ESTIMATE: ABNORMAL
PMV BLD AUTO: 9.5 FL (ref 8.1–13.5)
POTASSIUM SERPL-SCNC: 4.4 MMOL/L (ref 3.7–5.3)
RBC # BLD: 3.79 M/UL (ref 3.95–5.11)
RBC # BLD: ABNORMAL 10*6/UL
SEG NEUTROPHILS: 77 % (ref 36–65)
SEGMENTED NEUTROPHILS ABSOLUTE COUNT: 8.09 K/UL (ref 1.5–8.1)
SODIUM BLD-SCNC: 137 MMOL/L (ref 135–144)
WBC # BLD: 10.5 K/UL (ref 3.5–11.3)
WBC # BLD: ABNORMAL 10*3/UL

## 2021-06-08 PROCEDURE — 85025 COMPLETE CBC W/AUTO DIFF WBC: CPT

## 2021-06-08 PROCEDURE — 97116 GAIT TRAINING THERAPY: CPT

## 2021-06-08 PROCEDURE — 2060000000 HC ICU INTERMEDIATE R&B

## 2021-06-08 PROCEDURE — 99222 1ST HOSP IP/OBS MODERATE 55: CPT | Performed by: NURSE PRACTITIONER

## 2021-06-08 PROCEDURE — 97112 NEUROMUSCULAR REEDUCATION: CPT

## 2021-06-08 PROCEDURE — 97530 THERAPEUTIC ACTIVITIES: CPT

## 2021-06-08 PROCEDURE — 36415 COLL VENOUS BLD VENIPUNCTURE: CPT

## 2021-06-08 PROCEDURE — 6370000000 HC RX 637 (ALT 250 FOR IP): Performed by: PSYCHIATRY & NEUROLOGY

## 2021-06-08 PROCEDURE — 2580000003 HC RX 258: Performed by: INTERNAL MEDICINE

## 2021-06-08 PROCEDURE — 97535 SELF CARE MNGMENT TRAINING: CPT

## 2021-06-08 PROCEDURE — 92523 SPEECH SOUND LANG COMPREHEN: CPT

## 2021-06-08 PROCEDURE — 6360000002 HC RX W HCPCS: Performed by: INTERNAL MEDICINE

## 2021-06-08 PROCEDURE — 99232 SBSQ HOSP IP/OBS MODERATE 35: CPT | Performed by: PSYCHIATRY & NEUROLOGY

## 2021-06-08 PROCEDURE — 6370000000 HC RX 637 (ALT 250 FOR IP): Performed by: INTERNAL MEDICINE

## 2021-06-08 PROCEDURE — 80048 BASIC METABOLIC PNL TOTAL CA: CPT

## 2021-06-08 RX ORDER — LEVODOPA AND CARBIDOPA 195; 48.75 MG/1; MG/1
1 CAPSULE, EXTENDED RELEASE ORAL PRN
Status: ON HOLD | COMMUNITY
End: 2021-06-10 | Stop reason: HOSPADM

## 2021-06-08 RX ADMIN — SODIUM CHLORIDE, PRESERVATIVE FREE 10 ML: 5 INJECTION INTRAVENOUS at 08:55

## 2021-06-08 RX ADMIN — ENOXAPARIN SODIUM 30 MG: 30 INJECTION SUBCUTANEOUS at 08:47

## 2021-06-08 RX ADMIN — LORAZEPAM 0.5 MG: 0.5 TABLET ORAL at 18:03

## 2021-06-08 RX ADMIN — LORAZEPAM 0.5 MG: 0.5 TABLET ORAL at 04:55

## 2021-06-08 RX ADMIN — SODIUM CHLORIDE, PRESERVATIVE FREE 10 ML: 5 INJECTION INTRAVENOUS at 21:00

## 2021-06-08 RX ADMIN — BACLOFEN 10 MG: 10 TABLET ORAL at 20:52

## 2021-06-08 RX ADMIN — BACLOFEN 10 MG: 10 TABLET ORAL at 15:12

## 2021-06-08 RX ADMIN — BACLOFEN 10 MG: 10 TABLET ORAL at 08:46

## 2021-06-08 RX ADMIN — ESCITALOPRAM OXALATE 20 MG: 10 TABLET ORAL at 08:46

## 2021-06-08 RX ADMIN — LORAZEPAM 0.5 MG: 0.5 TABLET ORAL at 12:10

## 2021-06-08 ASSESSMENT — PAIN SCALES - GENERAL
PAINLEVEL_OUTOF10: 0
PAINLEVEL_OUTOF10: 4

## 2021-06-08 NOTE — PROGRESS NOTES
Subjective:     Follow-up Parkinson's  Patient was restarted on her Rytary still thrashing in bed with unpurposeful movements    ROS  No fever no chills no GI/ complaints no cardiopulmonary complaints she had swallow eval seen by speech therapy, no TIA no bleeding no headache no sore throat no skin lesions no polyuria no polydipsia  physical exam  General Appearance: in no acute distress and alert  Skin: warm and dry, no rash or erythema  Head: normocephalic and atraumatic  Eyes: pupils equal, round, and reactive to light, conjunctivae normal and sclera anicteric  Neck: neck supple and non tender without mass   Pulmonary/Chest: clear to auscultation bilaterally- no wheezes, rales or rhonchi, normal air movement, no respiratory distress  Cardiovascular: normal rate, regular rhythm, normal S1 and S2, no gallops, intact distal pulses and no carotid bruits  Abdomen: soft, non-tender, non-distended, normal bowel sounds, no masses or organomegaly  Extremities: no edema and good pulses no Homans' sign    Neurologic: Alert oriented x3+ tremors positive rigidity    BP (!) 166/89   Pulse 97   Temp 98.1 °F (36.7 °C) (Oral)   Resp 18   Ht 5' (1.524 m)   Wt 100 lb 2 oz (45.4 kg)   SpO2 96%   BMI 19.55 kg/m²     CBC:   Lab Results   Component Value Date    WBC 10.5 06/08/2021    RBC 3.79 06/08/2021    HGB 11.6 06/08/2021    HCT 39.3 06/08/2021    .7 06/08/2021    MCH 30.6 06/08/2021    MCHC 29.5 06/08/2021    RDW 14.2 06/08/2021     06/08/2021    MPV 9.5 06/08/2021     BMP:    Lab Results   Component Value Date     06/08/2021    K 4.4 06/08/2021     06/08/2021    CO2 23 06/08/2021    BUN 16 06/08/2021    LABALBU 3.8 06/07/2021    CREATININE 0.41 06/08/2021    CALCIUM 8.8 06/08/2021    GFRAA >60 06/08/2021    LABGLOM >60 06/08/2021    GLUCOSE 91 06/08/2021        Assessment:  Patient Active Problem List   Diagnosis    Parkinson's disease ([facility] [address]    Spondylosis of lumbar region without myelopathy

## 2021-06-08 NOTE — PROGRESS NOTES
movement in the right upper extremity   Reflex function Intact 2+ DTR and symmetric. Negative Babinski     Gait                  Not tested         No results found for: LDLCALC, LDLCHOLESTEROL, LDLDIRECT  No components found for: CHLPL  No results found for: TRIG  No results found for: HDL  No results found for: LDLCALC  No results found for: LABVLDL  No results found for: LABA1C  No results found for: EAG  No results found for: [id number]   Neurological work up:  CT head  CTA head and neck  MRI brain 10/24/2020 with sequela of chronic microvascular disease and volume loss. Dolichoectasia of posterior anterior circulation. 2 D echo     Assessment and Recommendations:   Patient with advanced Parkinson's disease, diagnosed in 1996 with significant ongoing phenomena and drug-induced dyskinesia. The patient is somewhat more awake today. She has been having dyskinetic movements. At this time, she is close to back to baseline. I will continue patient on her home dose of Rytary 145 mg +195 mg 2 tablets at 6 AM, 9 AM, noon, 3 PM, 6 PM and 9 PM.  As noted before, previous attempts to wean off Pearlean Naas has resulted in worsening Parkinson symptoms. I was unable to connect with patient's outpatient neurologist in [facility]. We will try to reach again today    Okay to transfer to skilled nursing facility from neurological standpoint. We will follow. Beltran Kasper MD  Neurology    This note is created with the assistance of a speech-recognition program. While intending to generate a document that actually reflects the content of the visit, the document can still have some errors including those of syntax and sound a- like substitutions which may escape proofreading. In such instances, actual meaning can be extrapolated by contextual derivation.

## 2021-06-08 NOTE — CARE COORDINATION
Social work: Spoke with [facility], they will need to do onsite visit prior to accepting. Writer also spoke with [facility], they are also reviewing referral at this time. Jackie Dover will do onsite visit Wednesday morning.

## 2021-06-08 NOTE — PROGRESS NOTES
Occupational Therapy  Facility/Department: [facility] PROGRESSIVE CARE  Daily Treatment Note  NAME: Rafal Lund  : 1945  MRN: [id number]    Date of Service: 2021    Discharge Recommendations:  Patient would benefit from continued therapy after discharge     Pt currently functioning below baseline. Would suggest additional therapy at time of discharge to maximize long term outcomes and prevent re-admission. Please refer to AM-PAC score for current level of function. Assessment   Performance deficits / Impairments: Decreased functional mobility ; Decreased ADL status; Decreased strength;Decreased safe awareness;Decreased cognition;Decreased endurance;Decreased posture;Decreased balance;Decreased coordination  Prognosis: Fair  OT Education: OT Role;Plan of Care;Home Exercise Program;Precautions; ADL Adaptive Strategies;Transfer Training;Energy Conservation; Family Education  REQUIRES OT FOLLOW UP: Yes  Activity Tolerance  Activity Tolerance: Patient limited by fatigue;Treatment limited secondary to decreased cognition  Safety Devices  Safety Devices in place: Yes  Type of devices: Call light within reach;Nurse notified;Gait belt;Patient at risk for falls; Left in chair;Chair alarm in place         Patient Diagnosis(es): The encounter diagnosis was Parkinson's disease ([facility] [address] has a past medical history of Cancer [facility]), Degenerative joint disease (DJD) of lumbar spine, Hearing loss, Hypertension, Osteoporosis, senile, Parkinson's disease ([facility] [address] Parkinson's disease ([facility] [address] and Tremor. has a past surgical history that includes Breast lumpectomy (Right); Tonsillectomy; Sigmoidoscopy; Colonoscopy; other surgical history; Mastectomy (Right, 2016); and other surgical history (2016).     Restrictions  Restrictions/Precautions  Restrictions/Precautions: General Precautions, Fall Risk  Position Activity Restriction  Other position/activity restrictions: up with assist, ALARMS, LUE IV, has telesitter Subjective   General  Chart Reviewed: Yes  Patient assessed for rehabilitation services?: Yes  Response to previous treatment: Patient with no complaints from previous session  Family / Caregiver Present: Yes ()  Subjective  Subjective: Patient and  pleasant and agreeable to OT treatment session. Orientation  Orientation  Overall Orientation Status: Impaired  Orientation Level: Oriented to person;Disoriented to situation;Disoriented to place; Disoriented to time  Objective    ADL  LE Dressing: Dependent/Total  Toileting: Dependent/Total  Additional Comments: assist x2 for brief change in stance        Balance  Sitting Balance: Moderate assistance  Standing Balance: Maximum assistance (Mod-Max x2, pt unpredictable)    Functional Mobility  Functional - Mobility Device: Rolling Walker  Activity: Other  Assist Level: Maximum assistance (MOd-Max A x2 for safety/balance, and pt unpredictable with movements/tremors)  Functional Mobility Comments: Patient completed functional mobility, small [place] around room with Mod-Max A x2 for safety/balance and pt unpredictable with tremors/movements. Tactile and verbal cues provided for BLE movement, hand over hand assist for moving RW, cues for staying inside RW. Bed mobility  Comment: Patient up in recliner upon arrival    Transfers  Sit to stand: Minimal assistance;2 Person assistance  Stand to sit: Minimal assistance;2 Person assistance      Cognition  Overall Cognitive Status: Exceptions  Arousal/Alertness: Delayed responses to stimuli  Following Commands: Inconsistently follows commands; Follows one step commands with increased time; Follows one step commands with repetition  Attention Span: Difficulty attending to directions  Memory: Decreased short term memory;Decreased long term memory;Decreased recall of biographical Information  Safety Judgement: Decreased awareness of need for assistance;Decreased awareness of need for safety  Problem Solving: Decreased awareness of errors;Assistance required to identify errors made;Assistance required to correct errors made;Assistance required to implement solutions  Insights: Not aware of deficits  Initiation: Requires cues for all  Sequencing: Requires cues for all      Plan   Plan  Times per week: 4-5x/week  Times per day: Daily  Current Treatment Recommendations: Strengthening, Balance Training, Functional Mobility Training, Endurance Training, Safety Education & Training, Self-Care / ADL, Patient/Caregiver Education & Training, Equipment Evaluation, Education, & procurement, Cognitive Reorientation, Cognitive/Perceptual Training, Positioning, Neuromuscular Re-education    AM-PAC Score        AM-[facility] Inpatient Daily Activity Raw Score: 11 (06/08/21 1227)  AM-PAC Inpatient ADL T-Scale Score : 29.04 (06/08/21 1227)  ADL Inpatient CMS 0-100% Score: 70.42 (06/08/21 1227)  ADL Inpatient CMS G-Code Modifier : CL (06/08/21 1227)    Goals  Short term goals  Time Frame for Short term goals: by discharge, pt will  Short term goal 1: demo min A with ADL transfers and functional mob in room distances with min cues for good safety and approp AD/DME  Short term goal 2: demo min A with bed mobility with rails/controls and min cues for safety  Short term goal 3: demo mod A with toileting routine with min cues for good safety and sequencing  Short term goal 4: demo min A with self feeding and grooming tasks following set up at approp level with min cues for sequencing/follow through  Short term goal 5: demo min A with UB ADLs with min cues for initiation/sequencing  Patient Goals   Patient goals : not stated by pt       Therapy Time   Individual Concurrent Group Co-treatment   Time In       0946   Time Out       1014   Minutes       28        Co-treatment with PT warranted secondary to decreased safety and independence requiring 2 skilled therapy professionals to address individual discipline's goals.  OT addressing preparation for ADL transfer, sitting balance for increased ADL performance, sitting/activity tolerance, functional reaching, environmental safety/scanning, fall prevention, functional mobility for ADL transfers, ability to sequence and follow directions and functional UE strength. Upon writer exit, call light within reach, pt retired to chair. All lines intact and patient positioned comfortably. Chair alarm in place. All patient needs addressed prior to ending therapy session. Chart reviewed prior to treatment and patient is agreeable for therapy. RN reports patient is medically stable for therapy treatment this date.     MERCY Ribera/L

## 2021-06-08 NOTE — PLAN OF CARE
Problem: Falls - Risk of:  Goal: Will remain free from falls  Description: Will remain free from falls  Outcome: Ongoing   Pt is a high fall risk per falls risk assessment. Remains free from falls and injuries, call light at reach and utilized appropriately. Bed in lowest position with wheels locked and alarm armed.  Up to chair, Unsteady d/t extreme tremors, family at bedside most of time, tele-sitter, falling star program and hourly rounding implemented, will continue to monitor and educate as needed     Problem: Mental Status - Impaired:  Goal: Mental status will improve  Description: Mental status will improve  Outcome: Ongoing   Pt returned to baseline, will continue to monitor and report changes

## 2021-06-08 NOTE — CARE COORDINATION
Social work:  Writer along with Carson/DANIELLE and Shandra/HERMANN RN met with spouse, dtr and son present to discuss plans. Spouse is overwhelmed with pt's care and they are interested in SNF placement. Family is interested in [facility] or [facility]. Referral faxed to both. Pt is going to [facility] on 6/24 for pre-op testing then is having 1st part of neuro sx on 7/8-9 and second part 7/22. HENS started.

## 2021-06-08 NOTE — PROGRESS NOTES
Speech Language Pathology  Facility/Department: [facility] PROGRESSIVE CARE  Initial Speech/Language/Cognitive Assessment    NAME: Jordon Davis  : [birth year]   MRN: [id number]  ADMISSION DATE: 2021  ADMITTING DIAGNOSIS: has Parkinson's disease ([address] Spondylosis of lumbar region without myelopathy or radiculopathy; Acute cystitis; and Parkinson's disease (tremor, stiffness, slow motion, unstable posture) ([address] on their problem list.    Date of Eval: 2021   Evaluating Therapist: ANNIKA Read    RECENT RESULTS  CT OF HEAD/MRI: 2021    Impression   Chronic findings in the brain without acute CT abnormality identified. Primary Complaint: Patient with known Parkinson's disease has stopped her medication for a week unable to move around recurrent falls tremors and rigidity decreased oral intake denies any nausea vomiting no abdominal pain no fever no chills occasional cough mild shortness of breath    Pain:  Pain Assessment  Pain Assessment: 0-10  Pain Level: 0    Assessment:  Pt presents with moderate cognitive-linguistic deficits characterized by difficulty recalling biographical information (time and situation), answering moderate-complex yes/no questions, and completing responsive naming, and divergent naming tasks. No dysarthria, no OM deficits noted. Pt and pt's  reports pt is close to baseline cognitive skills. ST to follow up and provide treatment to address noted deficits. Verbal education provided. Recommendations:  Requires SLP Intervention: Yes  Duration/Frequency of Treatment: 2-3xp er week  D/C Recommendations: Further therapy recommended at discharge. Plan:   Goals:  Short-term Goals  Goal 1: Pt will recall orientation information with 90% accuracy  Goal 2: Pt will answer moderate yes/no questions with 90% accuracy  Goal 3: Pt will complete naming (e.g. responsive, divergent) tasks with 90% accuracy  Goal 4: ST will further assess cognition as appropriate. Patient/family involved in developing goals and treatment plan: yes    Subjective:   Previous level of function and limitations:   General  Chart Reviewed: Yes  Family / Caregiver Present: Yes     Vision  Vision: Within Functional Limits  Hearing  Hearing: Within functional limits     Objective:  Oral/Motor  Oral Motor: Within functional limits    Auditory Comprehension  Comprehension: Exceptions  Yes/No Questions: Moderate (3/5)  One Step Basic Commands:  (5/5)  Two Step Basic Commands: 3/3    Expression  Primary Mode of Expression: Verbal    Verbal Expression  Verbal Expression: Exceptions to functional limits  Automatic Speech:  (2/3)  Confrontation:  (3/3)  Divergent: Moderate (2 units named in 30 seconds)  Responsive: Moderate (2/4)    Motor Speech  Motor Speech: Within Functional Limits    Cognition:   Orientation  Overall Orientation Status: Impaired (Pt oriented to person and place only, disoriented to time and situation)    Prognosis:  Speech Therapy Prognosis  Prognosis: Good  Individuals consulted  Consulted and agree with results and recommendations: Patient; Family member  Family member consulted: Pt's     Education:  Patient Education: yes  Patient Education Response: Verbalizes understanding          Therapy Time:   Individual Concurrent Group Co-treatment   Time In 0827         Time Out 0904         Minutes 19 [place] [place]  6/8/2021 8:39 AM

## 2021-06-08 NOTE — CONSULTS
Palliative Care Inpatient Consult    NAME:  Carolynn Bravo  MEDICAL RECORD NUMBER:  [id number]  AGE: 68 y.o. GENDER: female  : 1945  TODAY'S DATE:  2021    Reasons for Consultation:    Symptom and/or pain management  Provision of information regarding PC and/or hospice philosophies  Complex, time-intensive communication and interdisciplinary psychosocial support  Clarification of goals of care and/or assistance with difficult decision-making  Guidance in regards to resources and transition(s)    Members of PC team contributing to this consultation are :  Janki Fowler CNP  Palliative care   History of Present Illness     The patient is a 68 y.o. Non-/non  female who presents with Altered Mental Status      Referred to Palliative Care by   [x] Physician   [] Nursing  [] Family Request   [] Other:       She was admitted to the primary care  service for Parkinson's disease (tremor, stiffness, slow motion, unstable posture) ([facility] [address] [G20]. Her hospital course has been associated with <principal problem not specified>. The patient has a complicated medical history and has been hospitalized since 2021  1:05 PM.Patient came to hospital due to exacerbation of her Parkinson's disease. She had stopped her medications due to her somnolence and was unable to move around, had recurrent falls, decreased oral intake. Patient has medical history of Parkinson disease, breast cancer, Hypertension, DJD, and osteoporosis. Her labs from today include WBC 10.5, RBC 3.79, hemoglobin 11.6, hematocrit 39.3, platelets 432, glucose 91, BUN 16, creatinine 0.41, calcium 8.8, sodium 137, potassium 4.4. Patient has had the following scans during hospitalization CT head and chest x-ray and results are listed below. Patient has the following consults palliative care for goals of care, CODE STATUS discussion, and family support, and neurology due to Parkinson's disease.   Patient's family states she is scheduled at [facility] for an insert of a neurostimulator to reduce her symptoms of Parkinson's disease in the next month. Palliative care will follow by patient and provide emotional support and updates to family as needed.     Active Hospital Problems    Diagnosis Date Noted    Parkinson's disease (tremor, stiffness, slow motion, unstable posture) ([address] [G20] 2021       PAST MEDICAL HISTORY      Diagnosis Date    Cancer [facility])     breast    Degenerative joint disease (DJD) of lumbar spine     Hearing loss     Hypertension     Osteoporosis, senile     Parkinson's disease ([address]     Parkinson's disease ([address]     Tremor        PAST SURGICAL HISTORY  Past Surgical History:   Procedure Laterality Date    BREAST LUMPECTOMY Right     COLONOSCOPY      MASTECTOMY Right 2016    simple on 2016 - and again 10/13/2016    OTHER SURGICAL HISTORY      Radio frequency lesioning for low back pain    OTHER SURGICAL HISTORY  2016    growth removed from left side of neck    SIGMOIDOSCOPY      TONSILLECTOMY         SOCIAL HISTORY  Social History     Tobacco Use    Smoking status: Former Smoker     Quit date: 1996     Years since quittin.4    Smokeless tobacco: Never Used   Substance Use Topics    Alcohol use: No     Alcohol/week: 0.0 standard drinks    Drug use: No       ALLERGIES  No Known Allergies      MEDICATIONS  Current Medications    escitalopram  20 mg Oral Daily    LORazepam  0.5 mg Oral TID    Carbidopa-Levodopa ER  195 mg Oral 6x Daily    Carbidopa-Levodopa ER  1 capsule Oral 6x Daily    baclofen  10 mg Oral TID    traZODone  50 mg Oral Nightly    sodium chloride flush  5-40 mL Intravenous 2 times per day    enoxaparin  30 mg Subcutaneous Daily     hydrALAZINE, Carbidopa-Levodopa ER, Carbidopa-Levodopa ER, albuterol, albuterol sulfate HFA, sodium chloride flush, sodium chloride, ondansetron **OR** ondansetron, polyethylene glycol, acetaminophen **OR** acetaminophen  IV Drips/Infusions   sodium chloride       Home Medications  No current facility-administered medications on file prior to encounter. Current Outpatient Medications on File Prior to Encounter   Medication Sig Dispense Refill    Simethicone (GAS-X PO) Take 1 tablet by mouth 2 times daily as needed      oxyCODONE-acetaminophen (PERCOCET) 5-325 MG per tablet Take 2 tablets by mouth 2 times daily as needed for Pain.  NONFORMULARY Take 2 tablets by mouth daily Adult gummie calcium 500 mg each, two each morning      Psyllium (DAILY FIBER PO) Take 2 capsules by mouth daily      Carbidopa-Levodopa ER 36. MG CPCR Take 1 capsule by mouth 6 times daily 6a, 9a, 12p, 3p, 6p, 9p,     12a and 3a prn      Carbidopa-Levodopa ER 36. MG CPCR Take 1 capsule by mouth as needed Indications: at midnight and 3am if needed      Carbidopa-Levodopa ER (RYTARY) 48. MG CPCR Take 1 capsule by mouth as needed Indications: at midnight and 3am if needed      albuterol sulfate HFA (PROVENTIL HFA) 108 (90 Base) MCG/ACT inhaler Inhale 2 puffs into the lungs every 4 hours as needed for Wheezing or Shortness of Breath (Space out to every 6 hours as symptoms improve) Space out to every 6 hours as symptoms improve. 1 Inhaler 0    LORazepam (ATIVAN) 0.5 MG tablet Take 0.5 mg by mouth three times daily. 6a, 12p, 6p      Cholecalciferol (VITAMIN D3 PO) Take 4,000 Units by mouth daily       Magnesium 400 MG CAPS Take 2 capsules by mouth daily       Carbidopa-Levodopa ER (RYTARY) 48. MG CPCR Take 1 capsule by mouth 6 times daily Indications: 6a, 9a, 12p, 3p, 6p, 9p, Takes 145mg and 195 total of 6 times daily    6a, 9a, 12p, 3p, 6p, 9p,     12a and 3a prn      baclofen (LIORESAL) 10 MG tablet Take 10 mg by mouth 3 times daily as needed       diazepam (VALIUM) 5 MG tablet Take 1 tablet by mouth as needed (for dentist or procedures).        traZODone (DESYREL) 50 MG tablet Take 1 tablet by mouth nightly       Cardiovascular: no chest pain or pressure, no palpitations, no diaphoresis   Gastrointestinal: no nausea, vomiting,abdominal pain, diarrhea or constipation, no melena Decreased appetite   Genitourinary: no dysuria, frequency, hematuria, or nocturia   Musculoskeletal: generalized weakness and tremors   Skin: no rashes or sores   Neurological: no focal weakness, numbness, tingling or headache, no seizures    PHYSICAL ASSESSMENT:  Constitutional: Alert and oriented to person,follows some commands tremors    Head: Normocephalic and atraumatic. Eyes: EOM are normal. Pupils are equal, round. Neck: Normal range of motion. Neck supple. No tracheal deviation present. Cardiovascular: Normal rate and regular rhythm, S1, S2, no murmur. Pulmonary/Chest: Effort normal and breath sounds normal. No rales or wheezes. Abdomen: Soft. No tenderness, not distended, no ascites, no organomegaly. Musculoskeletal: generalized weakness and tremors   Neurological:Alert and oriented to person,follows some commands tremors     Skin: Normal turgor, no bleeding, no bruising. Palliative Performance Scale:  ___60%  Ambulation reduced; Significant disease; Can't do hobbies/housework; intake normal or reduced; occasional assist; LOC full/confusion  ___50%  Mainly sit/lie; Extensive disease; Can't do any work; Considerable assist; intake normal or reduced; LOC full/confusion  _x__40%  Mainly in bed; Extensive disease; Mainly assist; intake normal or reduced; LOC full/confusion   ___30%  Bed Bound; Extensive disease; Total care; intake reduced; LOC full/confusion  ___20%  Bed Bound; Extensive disease; Total care; intake minimal; Drowsy/coma  ___10%  Bed Bound; Extensive disease; Total care; Mouth care only; Drowsy/coma  ___0       Death      Plan      Palliative Interaction:  Spoke with patient  Nasreen Powers and patient daughter Sara Huang at bedside and introduced myself and my palliative care role.      We discussed patient condition and also Family    [x] Family or Healthcare DPOA independently    [] Unable to discuss with patient, family/DPOA not present    3- Code Status  DNR-CC    4- Other recommendations   - We will continue to provide comfort and support to the patient and the family    Palliative Care will continue to follow Ms. Cj Zambrano care as needed. Thank you for allowing Palliative Care to participate in the care of Ms. Cuate Carter . This note has been dictated by dragon, typing errors may be a possibility. The total time I spent in seeing the patient, discussing goals of care, advanced directives, code status, greater than 50% time in counseling and other major issues was more than 60 minutes      Electronically signed by   SHERICE Villaseñor NP  Palliative Care Team  on 6/8/2021 at 12:12 PM    Palliative care office: [phone]    Please call with any palliative questions or concerns. Palliative Care Team is available via perfect serve or via phone.

## 2021-06-08 NOTE — PLAN OF CARE
Problem: Falls - Risk of:  Goal: Will remain free from falls  Description: Will remain free from falls  6/7/2021 2056 by Hesham Akers RN  Outcome: Ongoing     Problem: Mental Status - Impaired:  Goal: Mental status will improve  Description: Mental status will improve  6/7/2021 2056 by Hesham Akers RN  Outcome: Ongoing

## 2021-06-08 NOTE — PROGRESS NOTES
Physical Therapy  Facility/Department: [facility]GRESSIVE CARE  Daily Treatment Note  NAME: Kamari Castano  : 1945  MRN: [id number]    Date of Service: 2021    Discharge Recommendations:  Patient would benefit from continued therapy after discharge   Pt currently functioning below baseline. Would suggest additional therapy at time of discharge to maximize long term outcomes and prevent re-admission. Please refer to AM-PAC score for current level of function. PT Equipment Recommendations  Equipment Needed: Yes  Mobility Devices: [place]rade Clan: Rolling    Assessment   Body structures, Functions, Activity limitations: Decreased functional mobility ; Decreased strength;Decreased safe awareness;Decreased cognition;Decreased endurance;Decreased balance;Decreased posture  Assessment: Pt with deficits of bed mobility, transfers, ambulation, balance, cognition, POOR safety awareness and endurance this session, & experiencing severe tremoring which impaired safety. Pt  required 2 assist for ALL functional mobility, transfers & gait. , & is decline compared to prior level of function. With current deficits, Pt HIGH risk for falls & requires continued PT to maximize independence with functional mobility, balance, safety awareness & activity tolerance. Pt currently functioning below baseline. Would suggest additional therapy at time of discharge to maximize long term outcomes and prevent re-admission. Please refer to AM-PAC score for current level of function. Prognosis: Good  PT Education: Goals;PT Role;Plan of Care;Precautions;Orientation;Transfer Training;General Safety;Gait Training;Functional Mobility Training  Patient Education: Ed pt on functional mobility, posture, importance of being up & OOB to regain strength, & prevention of sedentary complications, & safety with all functional mobility  REQUIRES PT FOLLOW UP: Yes  Activity Tolerance  Activity Tolerance: Patient limited by endurance; Patient limited by fatigue;Patient limited by cognitive status; Other  Activity Tolerance: severe tremoring     Patient Diagnosis(es): The encounter diagnosis was Parkinson's disease ([facility] [address] has a past medical history of Cancer [facility]), Degenerative joint disease (DJD) of lumbar spine, Hearing loss, Hypertension, Osteoporosis, senile, Parkinson's disease ([facility] [address] Parkinson's disease ([facility] [address] and Tremor. has a past surgical history that includes Breast lumpectomy (Right); Tonsillectomy; Sigmoidoscopy; Colonoscopy; other surgical history; Mastectomy (Right, 08/23/2016); and other surgical history (12/13/2016). Restrictions  Restrictions/Precautions  Restrictions/Precautions: General Precautions, Fall Risk  Required Braces or Orthoses?: No  Position Activity Restriction  Other position/activity restrictions: up with assist, ALARMS, LUE IV, has telesitter  Subjective   General  Chart Reviewed: Yes  Additional Pertinent Hx: [place], Parkinson's disease, HTN, tremor  Response To Previous Treatment: Patient with no complaints from previous session. Subjective  Subjective: Pt agreeable to PT  General Comment  Comments: RN okays PT  Pain Screening  Patient Currently in Pain: No  Vital Signs  Patient Currently in Pain: No  Cognition   Cognition  Overall Cognitive Status: Exceptions  Arousal/Alertness: Delayed responses to stimuli  Following Commands: Inconsistently follows commands; Follows one step commands with increased time; Follows one step commands with repetition  Attention Span: Difficulty attending to directions  Memory: Decreased short term memory;Decreased long term memory;Decreased recall of biographical Information  Safety Judgement: Decreased awareness of need for assistance;Decreased awareness of need for safety  Problem Solving: Decreased awareness of errors;Assistance required to identify errors made;Assistance required to correct errors made;Assistance required to implement solutions  Insights: Not aware of deficits  Initiation: Requires cues for all  Sequencing: Requires cues for all  Objective   Bed mobility  Comment: Upon writer's arrival, patient is up in the recliner with  and PCT present. Sharon calles was used to transport from the bed to chair. Transfers  Sit to Stand: Moderate Assistance;2 Person Assistance  Stand to sit: Moderate Assistance;2 Person Assistance  Comment: Max Ed + tactile assist for hand placement upper body for safe sit/stand, having severe tremors that impair ability. Writer held down [facility] as patient used it to pull herself. Max x 2 A for transfer with extensive VCs and Max assist for AD managment. Ambulation  Ambulation?: Yes  Ambulation 1  Surface: level tile  Device: Rolling Walker  Assistance: Maximum assistance;2 Person assistance  Quality of Gait: step to & choppy pattern, required almost 100% cueing to intiate steps (each steps small and shuffled, LT>RT to keep walker close, amb inside base of walker & for manuevering of device with turns  Distance: 10' x 1  Stairs/Curb  Stairs?: No  Neuromuscular Education  NDT Treatment: Lower extremity; Sitting;Standing  Neuromuscular Comments: Unsupported seated balance activity with exercises with foucs on postural control and to promoted mms fiber coordination. VCs req for proper breathing checo (pursed lip breathing) during functional mobility. Tactile and VCs req for postural control during sit<>stands & amb to promote abdominal and erector spinae mm facilitation for increased stability and balance, decreasing kyphosis of the spine. Pt req VCs to correct for anterior translation of femur with squatting in addition to pressing firmly into ground with feet, to promote the appropriate body mechanics for sit<>stand transfers. Returned to issues and reviewed seated and supine HEP with patient and spouse. Access Code: [email or web address]: [email or web address]  Exercises    Seated Scapular Retraction - 1 x daily - 7 x weekly - 10 reps - 3 sets    Seated Ankle Pumps - 1 x daily - 7 x weekly - 10 reps - 3 sets    Seated Gluteal Sets - 1 x daily - 7 x weekly - 10 reps - 3 sets    Seated Long Arc Quad - 1 x daily - 7 x weekly - 10 reps - 3 sets    Seated March - 1 x daily - 7 x weekly - 10 reps - 3 sets    Seated Hip Abduction - 1 x daily - 7 x weekly - 10 reps - 3 sets    Seated Pursed Lip Breathing - 1 x daily - 7 x weekly - 10 reps - 3 sets  Access Code: [email or web address]  Exercises    Supine Ankle Pumps - 1 x daily - 7 x weekly - 10 reps - 3 sets    Supine Quad Set - 1 x daily - 7 x weekly - 10 reps - 3 sets    Supine Heel Slide - 1 x daily - 7 x weekly - 10 reps - 3 sets    Supine Active Straight Leg Raise - 1 x daily - 7 x weekly - 10 reps - 3 sets    Supine Hip Abduction AROM - 1 x daily - 7 x weekly - 10 reps - 3 sets    Supine Bridge - 1 x daily - 7 x weekly - 10 reps - 3 sets    Supine Gluteal Sets - 1 x daily - 7 x weekly - 10 reps - 3 sets    Balance  Sitting - Static: Good;-  Sitting - Dynamic: Fair;+  Standing - Static: Fair;-  Standing - Dynamic: Poor;+  Comments: w/ Rw for standing balance    AM-PAC Score  AM-PAC Inpatient Mobility Raw Score : 9 (06/08/21 1327)  AM-PAC Inpatient T-Scale Score : 30.55 (06/08/21 1327)  Mobility Inpatient CMS 0-100% Score: 81.38 (06/08/21 1327)  Mobility Inpatient CMS G-Code Modifier : CM (06/08/21 1327)          Goals  Short term goals  Time Frame for Short term goals: 12 visits  Short term goal 1: Inc bed-mobility & transfers to independent to enable pt to safely get in/OOB & chair  Short term goal 2: Inc gait to amb 150ft or > indep w/ RW to enable pt to return to previous level of independence  Short term goal 3:  Inc strength to [facility] & standing balance to good with device to facilitate pt independence for performance of ADL's & functional mobility, & reduce fall risk  Short term goal 4: Pt able to tolerate 30-40 min of activity to include 15-20 reps of ex, NMR & functional mobility including 5

## 2021-06-08 NOTE — CARE COORDINATION
Discharge planning    Patient chart reviewed. Patient assessment deferred yesterday due to hospice [facility] meeting. Per hospice notes in chart and per notes family still wants to pursue surgery at [facility]. They were given information regarding palliative program [facility]. [facility] will follow up with spouse in one week. Neurology is seeing. He notes advanced parkinsons with significant ongoing phenomena and drug-induced dyskinesia    NEURO 6/7  The patient is waxing and waning but is probably close to back to baseline at this time     I will reinitiate the patient on her home dose of Rytary. .Reviewing the records from [facility] neurosurgery, there was a suggestion to taper off Rytary to prevent dyskinesia however as per , any attempt to decrease the medication results and worsening of Parkinson symptoms     I will contact her neurologist in [facility] before making any changes to any medications    Assessment will need to be completed today with family. Will have to discuss nutrition. Patient meals were 1-25% per epic. Per attending notes family declining tube feedings per g tube. ST completed swallow study yesterday. Recommending:   Recommended Diet and Intervention  Diet Solids Recommendation: Dysphagia Pureed (Dysphagia I)  Liquid Consistency Recommendation:  Thin  Recommended Form of Meds: Meds in puree

## 2021-06-08 NOTE — FLOWSHEET NOTE
Spoke with DR Jv Park, per family request, re: changing diet to regular, updated on patient and change in code status, objects to upgrade r/t risk for aspiration, states that adhering to speech therapist recommendation is most beneficial to patient, in to update family, dgtr at bedside assisting patient with eating a hamburger from [employer], updated on physician's response to request and educated on aspiration risk

## 2021-06-09 LAB
ABSOLUTE EOS #: 0.14 K/UL (ref 0–0.44)
ABSOLUTE IMMATURE GRANULOCYTE: 0.07 K/UL (ref 0–0.3)
ABSOLUTE LYMPH #: 0.85 K/UL (ref 1.1–3.7)
ABSOLUTE MONO #: 1.01 K/UL (ref 0.1–1.2)
ANION GAP SERPL CALCULATED.3IONS-SCNC: 13 MMOL/L (ref 9–17)
BASOPHILS # BLD: 1 % (ref 0–2)
BASOPHILS ABSOLUTE: 0.07 K/UL (ref 0–0.2)
BUN BLDV-MCNC: 15 MG/DL (ref 8–23)
BUN/CREAT BLD: ABNORMAL (ref 9–20)
CALCIUM SERPL-MCNC: 9 MG/DL (ref 8.6–10.4)
CHLORIDE BLD-SCNC: 103 MMOL/L (ref 98–107)
CO2: 24 MMOL/L (ref 20–31)
CREAT SERPL-MCNC: <0.4 MG/DL (ref 0.5–0.9)
DIFFERENTIAL TYPE: ABNORMAL
EOSINOPHILS RELATIVE PERCENT: 1 % (ref 1–4)
GFR AFRICAN AMERICAN: ABNORMAL ML/MIN
GFR NON-AFRICAN AMERICAN: ABNORMAL ML/MIN
GFR SERPL CREATININE-BSD FRML MDRD: ABNORMAL ML/MIN/{1.73_M2}
GFR SERPL CREATININE-BSD FRML MDRD: ABNORMAL ML/MIN/{1.73_M2}
GLUCOSE BLD-MCNC: 118 MG/DL (ref 70–99)
HCT VFR BLD CALC: 37.1 % (ref 36.3–47.1)
HEMOGLOBIN: 11.7 G/DL (ref 11.9–15.1)
IMMATURE GRANULOCYTES: 1 %
LYMPHOCYTES # BLD: 7 % (ref 24–43)
MCH RBC QN AUTO: 30.2 PG (ref 25.2–33.5)
MCHC RBC AUTO-ENTMCNC: 31.5 G/DL (ref 28.4–34.8)
MCV RBC AUTO: 95.9 FL (ref 82.6–102.9)
MONOCYTES # BLD: 8 % (ref 3–12)
NRBC AUTOMATED: 0 PER 100 WBC
PDW BLD-RTO: 13.8 % (ref 11.8–14.4)
PLATELET # BLD: 321 K/UL (ref 138–453)
PLATELET ESTIMATE: ABNORMAL
PMV BLD AUTO: 9.2 FL (ref 8.1–13.5)
POTASSIUM SERPL-SCNC: 3.4 MMOL/L (ref 3.7–5.3)
RBC # BLD: 3.87 M/UL (ref 3.95–5.11)
RBC # BLD: ABNORMAL 10*6/UL
SEG NEUTROPHILS: 83 % (ref 36–65)
SEGMENTED NEUTROPHILS ABSOLUTE COUNT: 10.11 K/UL (ref 1.5–8.1)
SODIUM BLD-SCNC: 140 MMOL/L (ref 135–144)
WBC # BLD: 12.3 K/UL (ref 3.5–11.3)
WBC # BLD: ABNORMAL 10*3/UL

## 2021-06-09 PROCEDURE — 6370000000 HC RX 637 (ALT 250 FOR IP): Performed by: INTERNAL MEDICINE

## 2021-06-09 PROCEDURE — 97535 SELF CARE MNGMENT TRAINING: CPT

## 2021-06-09 PROCEDURE — 2060000000 HC ICU INTERMEDIATE R&B

## 2021-06-09 PROCEDURE — 80048 BASIC METABOLIC PNL TOTAL CA: CPT

## 2021-06-09 PROCEDURE — 36415 COLL VENOUS BLD VENIPUNCTURE: CPT

## 2021-06-09 PROCEDURE — 85025 COMPLETE CBC W/AUTO DIFF WBC: CPT

## 2021-06-09 PROCEDURE — 6360000002 HC RX W HCPCS: Performed by: INTERNAL MEDICINE

## 2021-06-09 PROCEDURE — 97116 GAIT TRAINING THERAPY: CPT

## 2021-06-09 PROCEDURE — 6370000000 HC RX 637 (ALT 250 FOR IP): Performed by: PSYCHIATRY & NEUROLOGY

## 2021-06-09 PROCEDURE — 97530 THERAPEUTIC ACTIVITIES: CPT

## 2021-06-09 PROCEDURE — 99232 SBSQ HOSP IP/OBS MODERATE 35: CPT | Performed by: PSYCHIATRY & NEUROLOGY

## 2021-06-09 PROCEDURE — 97112 NEUROMUSCULAR REEDUCATION: CPT

## 2021-06-09 RX ORDER — POTASSIUM CHLORIDE 750 MG/1
10 CAPSULE, EXTENDED RELEASE ORAL ONCE
Status: COMPLETED | OUTPATIENT
Start: 2021-06-09 | End: 2021-06-09

## 2021-06-09 RX ORDER — LORAZEPAM 2 MG/ML
0.5 INJECTION INTRAMUSCULAR EVERY 6 HOURS PRN
Status: DISCONTINUED | OUTPATIENT
Start: 2021-06-09 | End: 2021-06-10

## 2021-06-09 RX ORDER — OXYCODONE HYDROCHLORIDE AND ACETAMINOPHEN 5; 325 MG/1; MG/1
1 TABLET ORAL EVERY 6 HOURS PRN
Status: DISCONTINUED | OUTPATIENT
Start: 2021-06-09 | End: 2021-06-16 | Stop reason: HOSPADM

## 2021-06-09 RX ADMIN — BACLOFEN 10 MG: 10 TABLET ORAL at 20:51

## 2021-06-09 RX ADMIN — TRAZODONE HYDROCHLORIDE 50 MG: 50 TABLET ORAL at 20:51

## 2021-06-09 RX ADMIN — ENOXAPARIN SODIUM 30 MG: 30 INJECTION SUBCUTANEOUS at 08:42

## 2021-06-09 RX ADMIN — BACLOFEN 10 MG: 10 TABLET ORAL at 08:42

## 2021-06-09 RX ADMIN — LORAZEPAM 0.5 MG: 0.5 TABLET ORAL at 17:53

## 2021-06-09 RX ADMIN — ESCITALOPRAM OXALATE 20 MG: 10 TABLET ORAL at 08:42

## 2021-06-09 RX ADMIN — ACETAMINOPHEN 650 MG: 325 TABLET ORAL at 08:42

## 2021-06-09 RX ADMIN — BACLOFEN 10 MG: 10 TABLET ORAL at 15:00

## 2021-06-09 RX ADMIN — POTASSIUM CHLORIDE 10 MEQ: 10 CAPSULE, COATED, EXTENDED RELEASE ORAL at 08:42

## 2021-06-09 RX ADMIN — HYDRALAZINE HYDROCHLORIDE 5 MG: 20 INJECTION INTRAMUSCULAR; INTRAVENOUS at 00:50

## 2021-06-09 RX ADMIN — LORAZEPAM 0.5 MG: 0.5 TABLET ORAL at 11:37

## 2021-06-09 RX ADMIN — LORAZEPAM 0.5 MG: 0.5 TABLET ORAL at 05:22

## 2021-06-09 ASSESSMENT — PAIN SCALES - GENERAL
PAINLEVEL_OUTOF10: 3
PAINLEVEL_OUTOF10: 0
PAINLEVEL_OUTOF10: 0

## 2021-06-09 NOTE — PROGRESS NOTES
Subjective: Follow-up Parkinson    About the same she had.   Agitation yesterday better today more cooperative alert oriented x3  ROS  No fever no chills no GI/ complaints no cardiopulmonary complaints no TIA no bleeding no headache no sore throat no skin lesion no polyuria no clubbing  physical exam  General Appearance: in no acute distress and alert  Skin: warm and dry, no rash or erythema  Head: normocephalic and atraumatic  Eyes: pupils equal, round, and reactive to light, conjunctivae normal and sclera anicteric    Neck: neck supple and non tender without mass   Pulmonary/Chest: clear to auscultation bilaterally- no wheezes, rales or rhonchi, normal air movement, no respiratory distress  Cardiovascular: normal rate, regular rhythm, normal S1 and S2, no gallops, intact distal pulses and no carotid bruits  Abdomen: soft, non-tender, non-distended, normal bowel sounds, no masses or organomegaly  Extremities: no edema and good pulses no Homans' sign    Neurologic: Alert oriented x3+ tremors positive rigidity    BP (!) 146/77   Pulse 80   Temp 97.7 °F (36.5 °C) (Oral)   Resp 16   Ht 5' (1.524 m)   Wt 100 lb 2 oz (45.4 kg)   SpO2 99%   BMI 19.55 kg/m²     CBC:   Lab Results   Component Value Date    WBC 12.3 06/09/2021    RBC 3.87 06/09/2021    HGB 11.7 06/09/2021    HCT 37.1 06/09/2021    MCV 95.9 06/09/2021    MCH 30.2 06/09/2021    MCHC 31.5 06/09/2021    RDW 13.8 06/09/2021     06/09/2021    MPV 9.2 06/09/2021     BMP:    Lab Results   Component Value Date     06/09/2021    K 3.4 06/09/2021     06/09/2021    CO2 24 06/09/2021    BUN 15 06/09/2021    LABALBU 3.8 06/07/2021    CREATININE <0.40 06/09/2021    CALCIUM 9.0 06/09/2021    GFRAA CANNOT BE CALCULATED 06/09/2021    LABGLOM CANNOT BE CALCULATED 06/09/2021    GLUCOSE 118 06/09/2021        Assessment:  Patient Active Problem List   Diagnosis    [facility]'s disease ([facility] [address]    Spondylosis of lumbar region without myelopathy or radiculopathy    Acute cystitis    Parkinson's disease (tremor, stiffness, slow motion, unstable posture) (HCC)     Parkinson's disease  Hypokalemia  Plan:    Meds Meds labs reviewed continue with present treatment physical therapy occupational therapy patient medically ready for discharge await pre-CERT for skilled nursing facility      Anabella Bynum MD MD  7:32 PM

## 2021-06-09 NOTE — PROGRESS NOTES
Occupational Therapy  Facility/Department: [facility] PROGRESSIVE CARE  Daily Treatment Note  NAME: Laurie Ramirez  : 1945  MRN: [id number]    Date of Service: 2021    Discharge Recommendations:  Patient would benefit from continued therapy after discharge     Pt currently functioning below baseline. Would suggest additional therapy at time of discharge to maximize long term outcomes and prevent re-admission. Please refer to AM-PAC score for current level of function. Assessment   Performance deficits / Impairments: Decreased functional mobility ; Decreased ADL status; Decreased strength;Decreased safe awareness;Decreased cognition;Decreased endurance;Decreased posture;Decreased balance;Decreased coordination  Prognosis: Fair  OT Education: OT Role;Plan of Care;Precautions; ADL Adaptive Strategies;Transfer Training;Energy Conservation; Family Education  REQUIRES OT FOLLOW UP: Yes  Activity Tolerance  Activity Tolerance: Patient limited by fatigue;Treatment limited secondary to decreased cognition  Safety Devices  Safety Devices in place: Yes  Type of devices: Call light within reach;Nurse notified;Gait belt;Patient at risk for falls; Left in chair;Chair alarm in place         Patient Diagnosis(es): The encounter diagnosis was Parkinson's disease ([facility] [address] has a past medical history of Cancer [facility]), Degenerative joint disease (DJD) of lumbar spine, Hearing loss, Hypertension, Osteoporosis, senile, Parkinson's disease ([facility] [address] Parkinson's disease ([facility] [address] and Tremor. has a past surgical history that includes Breast lumpectomy (Right); Tonsillectomy; Sigmoidoscopy; Colonoscopy; other surgical history; Mastectomy (Right, 2016); and other surgical history (2016).     Restrictions  Restrictions/Precautions  Restrictions/Precautions: General Precautions, Fall Risk  Required Braces or Orthoses?: No  Position Activity Restriction  Other position/activity restrictions: up with assist, ALARMS, LUE IV, has telesitter  Subjective   General  Chart Reviewed: Yes  Patient assessed for rehabilitation services?: Yes  Response to previous treatment: Patient with no complaints from previous session  Family / Caregiver Present: Yes ( and daughter)  Subjective  Subjective: Patient and  pleasant and agreeable to OT treatment session. Orientation  Orientation  Overall Orientation Status: Impaired  Orientation Level: Oriented to person;Disoriented to situation;Disoriented to place; Disoriented to time  Objective    ADL  Grooming: Moderate assistance (Patient requires assist to place tooth paste onto tooth brush, patient able to brush her own teeth, would not let writer assist with throuoghness. increased tremors and increased time to complete)  UE Dressing: Maximum assistance  LE Dressing: Maximum assistance  Toileting: Maximum assistance   Patient incontinent with stool. Max A x2 for clean up in stance to don clean brief and change gown. Balance  Sitting Balance: Contact guard assistance    Standing Balance: Moderate assistance (slight posterior lean at times)  Standing Balance  Time: standing balance ~5 minutes  Activity: grooming at sink  Comment: min-mod A for balance at sink    Functional Mobility  Functional - Mobility Device: Rolling Walker  Activity: Other  Assist Level: Moderate assistance (Min-Mod A x2 for safety/balance)  Functional Mobility Comments: Patient completed functional mobility, small [place] around room with Min-Mod A x2 for safety/balance and pt unpredictable with tremors/movements. Tactile and verbal cues provided for BLE movement, hand over hand assist for moving RW, cues for staying inside RW. Bed mobility  Comment: patient up in recliner    Transfers  Sit to stand: Minimal assistance  Stand to sit: Minimal assistance  Transfer Comments: poor safety awareness. Max cuing required.       Cognition  Overall Cognitive Status: Exceptions  Arousal/Alertness: Delayed responses to stimuli;Inconsistent responses to stimuli  Following Commands: Inconsistently follows commands; Follows one step commands with increased time; Follows one step commands with repetition  Attention Span: Difficulty attending to directions  Memory: Decreased short term memory;Decreased long term memory;Decreased recall of biographical Information  Safety Judgement: Decreased awareness of need for assistance;Decreased awareness of need for safety  Problem Solving: Decreased awareness of errors;Assistance required to identify errors made;Assistance required to correct errors made;Assistance required to implement solutions  Insights: Not aware of deficits  Initiation: Requires cues for all  Sequencing: Requires cues for all      Plan   Plan  Times per week: 4-5x/week  Times per day: Daily  Current Treatment Recommendations: Strengthening, Balance Training, Functional Mobility Training, Endurance Training, Safety Education & Training, Self-Care / ADL, Patient/Caregiver Education & Training, Equipment Evaluation, Education, & procurement, Cognitive Reorientation, Cognitive/Perceptual Training, Positioning, Neuromuscular Re-education    AM-PAC Score        AM-[facility] Inpatient Daily Activity Raw Score: 11 (06/09/21 1459)  AM-PAC Inpatient ADL T-Scale Score : 29.04 (06/09/21 1459)  ADL Inpatient CMS 0-100% Score: 70.42 (06/09/21 1459)  ADL Inpatient CMS G-Code Modifier : CL (06/09/21 1459)    Goals  Short term goals  Time Frame for Short term goals: by discharge, pt will  Short term goal 1: demo min A with ADL transfers and functional mob in room distances with min cues for good safety and approp AD/DME  Short term goal 2: demo min A with bed mobility with rails/controls and min cues for safety  Short term goal 3: demo mod A with toileting routine with min cues for good safety and sequencing  Short term goal 4: demo min A with self feeding and grooming tasks following set up at approp level with min cues for sequencing/follow through  Short term goal 5: demo min A with UB ADLs with min cues for initiation/sequencing  Patient Goals   Patient goals : not stated by pt       Therapy Time   Individual Concurrent Group Co-treatment   Time In       1050   Time Out       18   Minutes       35     Co-treatment with PT warranted secondary to decreased safety and independence requiring 2 skilled therapy professionals to address individual discipline's goals. OT addressing preparation for ADL transfer, sitting balance for increased ADL performance, sitting/activity tolerance, functional reaching, environmental safety/scanning, fall prevention, functional mobility for ADL transfers, ability to sequence and follow directions and functional UE strength. Upon writer exit, call light within reach, pt retired to chair. All lines intact and patient positioned comfortably. Chair alarm in place. All patient needs addressed prior to ending therapy session. Chart reviewed prior to treatment and patient is agreeable for therapy. RN reports patient is medically stable for therapy treatment this date.     MERCY Barkley/WILLIAM

## 2021-06-09 NOTE — PROGRESS NOTES
Physical Therapy  NAME: Helio Doty  : 1945  MRN: [id number]     Date of Service: 2021     Discharge Recommendations:  Patient would benefit from continued therapy after discharge   Pt currently functioning below baseline. Would suggest additional therapy at time of discharge to maximize long term outcomes and prevent re-admission. Please refer to AM-PAC score for current level of function. PT Equipment Recommendations  Equipment Needed: Yes  Mobility Devices: Redell Furlong: Rolling     Assessment   Body structures, Functions, Activity limitations: Decreased functional mobility ; Decreased strength;Decreased safe awareness;Decreased cognition;Decreased endurance;Decreased balance;Decreased posture  Assessment: Pt with deficits of bed mobility, transfers, ambulation, balance, cognition, POOR safety awareness and endurance this session, & experiencing severe tremoring which impaired safety. Pt  required 2 assist for ALL functional mobility, transfers & gait. , & is decline compared to prior level of function. With current deficits, Pt HIGH risk for falls & requires continued PT to maximize independence with functional mobility, balance, safety awareness & activity tolerance. Pt currently functioning below baseline. Would suggest additional therapy at time of discharge to maximize long term outcomes and prevent re-admission. Please refer to AM-PAC score for current level of function. Prognosis: Good  PT Education: Goals;PT Role;Plan of Care;Precautions;Orientation;Transfer Training;General Safety;Gait Training;Functional Mobility Training  Patient Education: Ed pt on functional mobility, posture, importance of being up & OOB to regain strength, & prevention of sedentary complications, & safety with all functional mobility  REQUIRES PT FOLLOW UP: Yes  Activity Tolerance  Activity Tolerance: Patient limited by endurance; Patient limited by fatigue;Patient limited by cognitive status; Other  Activity Tolerance: severe tremoring      Patient Diagnosis(es): The encounter diagnosis was Parkinson's disease ([facility] [address]     has a past medical history of Cancer ([facility] [address] Degenerative joint disease (DJD) of lumbar spine, Hearing loss, Hypertension, Osteoporosis, senile, Parkinson's disease ([facility] [address] Parkinson's disease ([facility] [address] and Tremor. has a past surgical history that includes Breast lumpectomy (Right); Tonsillectomy; Sigmoidoscopy; Colonoscopy; other surgical history; Mastectomy (Right, 08/23/2016); and other surgical history (12/13/2016).    Restrictions  Restrictions/Precautions  Restrictions/Precautions: General Precautions, Fall Risk  Required Braces or Orthoses?: No  Position Activity Restriction  Other position/activity restrictions: up with assist, ALARMS, LUE IV, has telesitter  Subjective   General  Chart Reviewed: Yes  Additional Pertinent Hx: [place], Parkinson's disease, HTN, tremor  Response To Previous Treatment: Patient with no complaints from previous session. Subjective  Subjective: Pt agreeable to PT  General Comment  Comments: RN okays PT  Pain Screening  Patient Currently in Pain: No  Vital Signs  Patient Currently in Pain: No  Cognition   Cognition  Overall Cognitive Status: Exceptions  Arousal/Alertness: Delayed responses to stimuli  Following Commands: Inconsistently follows commands; Follows one step commands with increased time; Follows one step commands with repetition  Attention Span: Difficulty attending to directions  Memory: Decreased short term memory;Decreased long term memory;Decreased recall of biographical Information  Safety Judgement: Decreased awareness of need for assistance;Decreased awareness of need for safety  Problem Solving: Decreased awareness of errors;Assistance required to identify errors made;Assistance required to correct errors made;Assistance required to implement solutions  Insights: Not aware of deficits  Initiation: Requires cues for all  Sequencing: Requires cues for all Objective   Bed mobility  Comment: Upon writer's arrival, patient is up in the recliner with  and PCT present. TERESE calles was used to transport from the bed to chair. Transfers  Sit to Stand: Moderate Assistance;2 Person Assistance  Stand to sit: Moderate Assistance;2 Person Assistance  Comment: Max Ed + tactile assist for hand placement upper body for safe sit/stand, having severe tremors that impair ability. Writer held down [employer] as patient used it to pull herself. Max x 2 A for transfer with extensive VCs and Max assist for AD managment. Ambulation  Ambulation?: Yes  Ambulation 1  Surface: level tile  Device: Rolling Walker  Assistance: Maximum assistance;2 Person assistance  Quality of Gait: step to & choppy pattern, required almost 100% cueing to intiate steps (each steps small and shuffled, LT>RT to keep walker close, amb inside base of walker & for manuevering of device with turns  Distance: 10' x 1  Stairs/Curb  Stairs?: No  Neuromuscular Education  NDT Treatment: Lower extremity; Sitting;Standing  Neuromuscular Comments: Unsupported seated balance activity with exercises with foucs on postural control and to promoted mms fiber coordination. VCs req for proper breathing checo (pursed lip breathing) during functional mobility. Tactile and VCs req for postural control during sit<>stands & amb to promote abdominal and erector spinae mm facilitation for increased stability and balance, decreasing kyphosis of the spine. Pt req VCs to correct for anterior translation of femur with squatting in addition to pressing firmly into ground with feet, to promote the appropriate body mechanics for sit<>stand transfers.      Balance  Sitting - Static: Good;-  Sitting - Dynamic: Fair;+  Standing - Static: Fair;-  Standing - Dynamic: Poor;+  Comments: w/ Rw for standing balance     AM-PAC Score  AM-PAC Inpatient Mobility Raw Score : 9 (06/08/21 1327)  AM-PAC Inpatient T-Scale Score : 30.55 (06/08/21 1327)  Mobility Inpatient CMS 0-100% Score: 81.38 (06/08/21 1327)  Mobility Inpatient CMS G-Code Modifier : CM (06/08/21 1327)     Goals  Short term goals  Time Frame for Short term goals: 12 visits  Short term goal 1: Inc bed-mobility & transfers to independent to enable pt to safely get in/OOB & chair  Short term goal 2: Inc gait to amb 150ft or > indep w/ RW to enable pt to return to previous level of independence  Short term goal 3: Inc strength to [address] standing balance to good with device to facilitate pt independence for performance of ADL's & functional mobility, & reduce fall risk  Short term goal 4: Pt able to tolerate 30-40 min of activity to include 15-20 reps of ex, NMR & functional mobility including 5 minutes of standing with device to facilitate activity tolerance to [facility]  Short term goal 5: Ed pt on home ex's, safety & energy principles, fall prevention, & issue written home program     Plan    Plan  Times per week: 1-2x/d, 5-6D/week  Current Treatment Recommendations: Strengthening, Balance Training, Functional Mobility Training, Transfer Training, Endurance Training, Gait Training, Cognitive Reorientation, Home Exercise Program, Safety Education & Training, Patient/Caregiver Education & Training  Safety Devices  Type of devices: Bed alarm in place, Call light within reach, Gait belt, Patient at risk for falls, Left in bed, Nurse notified      Therapy Time    Individual Concurrent Group Co-treatment   Time In    0   Time Out    36   Minutes    36     Co-treatment with OT warranted secondary to decreased safety and independence requiring 2 skilled therapy professionals to address individual discipline's goals.  PT addressing pre gait trunk strengthening, weight shifting prior to transfers, transfer training and postural control in sitting/standing.     Rosey Mcneil, PTA

## 2021-06-09 NOTE — PROGRESS NOTES
Patient severely aggressive, combative and agitated. Writer reached out to Dr. Kate Benitez. Patient pulled her IV out. Multiple attempts to get IV in but patient is too agitated be still. Writer and three other staff members at bedside to help patient remain calm.

## 2021-06-09 NOTE — PROGRESS NOTES
[facility] ([facility]) Neurology Specialist  [address], [address] [facility]:  [phone] or [phone]  FAX:  [phone]            Brief history: Uzair Lazo is a 68 y.o. old female admitted on 6/6/2021 with encephalopathy     Subjective: The family the bedside. The patient is more awake and close to back to baseline as per family. She continues to have dyskinetic movements. Objective: BP (!) 146/77   Pulse 80   Temp 97.7 °F (36.5 °C) (Oral)   Resp 16   Ht 5' (1.524 m)   Wt 100 lb 2 oz (45.4 kg)   SpO2 99%   BMI 19.55 kg/m²       Medications:    escitalopram  20 mg Oral Daily    LORazepam  0.5 mg Oral TID    Carbidopa-Levodopa ER  195 mg Oral 6x Daily    Carbidopa-Levodopa ER  1 capsule Oral 6x Daily    baclofen  10 mg Oral TID    traZODone  50 mg Oral Nightly    sodium chloride flush  5-40 mL Intravenous 2 times per day    enoxaparin  30 mg Subcutaneous Daily        Neurological examination:    Mental status   Awake. Oriented to self. Able to follow one-step two-step commands.    Cranial nerves   II - visual fields intact to confrontation                                                III, IV, VI  extra-ocular muscles full: no pupillary defect; no MOHAN, no nystagmus, no ptosis                                                                      V - normal facial sensation                                                               VII - normal facial symmetry                                                             VIII - intact hearing                                                                             IX, X - symmetrical palate                                                                  XI - symmetrical shoulder shrug                                                       XII - midline tongue without atrophy or fasciculation     Motor function   flexion contracture and spasticity in both upper and lower extremities     Sensory function  grossly intact   Cerebellar  mild dyskinetic movement in the right upper extremity   Reflex function Intact 2+ DTR and symmetric. Negative Babinski     Gait                  Not tested         No results found for: LDLCALC, LDLCHOLESTEROL, LDLDIRECT  No components found for: CHLPL  No results found for: TRIG  No results found for: HDL  No results found for: LDLCALC  No results found for: LABVLDL  No results found for: LABA1C  No results found for: EAG  No results found for: [id number]   Neurological work up:  CT head  CTA head and neck  MRI brain 10/24/2020 with sequela of chronic microvascular disease and volume loss. Dolichoectasia of posterior anterior circulation. 2 D echo     Assessment and Recommendations:   Patient with advanced Parkinson's disease, diagnosed in 1996 with significant ongoing phenomena and drug-induced dyskinesia. The patient is now close to back to baseline. The family at the bedside confirms that patient is improved. She continues to have dyskinetic movements. It is agreed upon to cut down the frequency of the Rytary and change it from 6 times a day to 5 times a day, discontinuing 6 AM dose. If patient has worsening symptoms, please call neurology    Okay to discharge from neurological standpoint with outpatient follow-up with her neurologist and neurosurgeon as scheduled. The patient is scheduled for preop evaluation for deep brain stimulator later this month. We will follow. Dhruv Bermudez MD  Neurology    This note is created with the assistance of a speech-recognition program. While intending to generate a document that actually reflects the content of the visit, the document can still have some errors including those of syntax and sound a- like substitutions which may escape proofreading. In such instances, actual meaning can be extrapolated by contextual derivation.

## 2021-06-09 NOTE — CARE COORDINATION
Social work: [employer] denied. [facility] will follow patient over next 24-48 hours for improvement. SW met with daughter and spouse and informed them of above and requested more snf choices. Family gave more choices of [facility]. Referrals faxed to all facilities. As mentioned, first choice is still [facility] and they continue to follow.

## 2021-06-09 NOTE — PLAN OF CARE
Problem: Falls - Risk of:  Goal: Will remain free from falls  Description: Will remain free from falls  Outcome: Ongoing     Problem: Mental Status - Impaired:  Goal: Mental status will improve  Description: Mental status will improve  Outcome: Ongoing     Problem: Coping:  Goal: Ability to remain calm will improve  Description: Ability to remain calm will improve  Outcome: Ongoing     Problem: Safety:  Goal: Ability to remain free from injury will improve  Description: Ability to remain free from injury will improve  Outcome: Ongoing

## 2021-06-10 LAB
ABSOLUTE EOS #: 0.21 K/UL (ref 0–0.44)
ABSOLUTE IMMATURE GRANULOCYTE: 0.05 K/UL (ref 0–0.3)
ABSOLUTE LYMPH #: 0.84 K/UL (ref 1.1–3.7)
ABSOLUTE MONO #: 0.82 K/UL (ref 0.1–1.2)
ANION GAP SERPL CALCULATED.3IONS-SCNC: 11 MMOL/L (ref 9–17)
BASOPHILS # BLD: 1 % (ref 0–2)
BASOPHILS ABSOLUTE: 0.06 K/UL (ref 0–0.2)
BUN BLDV-MCNC: 11 MG/DL (ref 8–23)
BUN/CREAT BLD: 26 (ref 9–20)
CALCIUM SERPL-MCNC: 8.9 MG/DL (ref 8.6–10.4)
CHLORIDE BLD-SCNC: 103 MMOL/L (ref 98–107)
CO2: 24 MMOL/L (ref 20–31)
CREAT SERPL-MCNC: 0.43 MG/DL (ref 0.5–0.9)
DIFFERENTIAL TYPE: ABNORMAL
EOSINOPHILS RELATIVE PERCENT: 3 % (ref 1–4)
GFR AFRICAN AMERICAN: >60 ML/MIN
GFR NON-AFRICAN AMERICAN: >60 ML/MIN
GFR SERPL CREATININE-BSD FRML MDRD: ABNORMAL ML/MIN/{1.73_M2}
GFR SERPL CREATININE-BSD FRML MDRD: ABNORMAL ML/MIN/{1.73_M2}
GLUCOSE BLD-MCNC: 102 MG/DL (ref 70–99)
HCT VFR BLD CALC: 35.7 % (ref 36.3–47.1)
HEMOGLOBIN: 10.8 G/DL (ref 11.9–15.1)
IMMATURE GRANULOCYTES: 1 %
LYMPHOCYTES # BLD: 10 % (ref 24–43)
MCH RBC QN AUTO: 30.3 PG (ref 25.2–33.5)
MCHC RBC AUTO-ENTMCNC: 30.3 G/DL (ref 28.4–34.8)
MCV RBC AUTO: 100.3 FL (ref 82.6–102.9)
MONOCYTES # BLD: 10 % (ref 3–12)
NRBC AUTOMATED: 0 PER 100 WBC
PDW BLD-RTO: 14 % (ref 11.8–14.4)
PLATELET # BLD: 315 K/UL (ref 138–453)
PLATELET ESTIMATE: ABNORMAL
PMV BLD AUTO: 9.7 FL (ref 8.1–13.5)
POTASSIUM SERPL-SCNC: 3.5 MMOL/L (ref 3.7–5.3)
RBC # BLD: 3.56 M/UL (ref 3.95–5.11)
RBC # BLD: ABNORMAL 10*6/UL
SEG NEUTROPHILS: 75 % (ref 36–65)
SEGMENTED NEUTROPHILS ABSOLUTE COUNT: 6.25 K/UL (ref 1.5–8.1)
SODIUM BLD-SCNC: 138 MMOL/L (ref 135–144)
WBC # BLD: 8.2 K/UL (ref 3.5–11.3)
WBC # BLD: ABNORMAL 10*3/UL

## 2021-06-10 PROCEDURE — 97110 THERAPEUTIC EXERCISES: CPT

## 2021-06-10 PROCEDURE — 92526 ORAL FUNCTION THERAPY: CPT

## 2021-06-10 PROCEDURE — 97535 SELF CARE MNGMENT TRAINING: CPT

## 2021-06-10 PROCEDURE — 2060000000 HC ICU INTERMEDIATE R&B

## 2021-06-10 PROCEDURE — 6360000002 HC RX W HCPCS: Performed by: PSYCHIATRY & NEUROLOGY

## 2021-06-10 PROCEDURE — 99232 SBSQ HOSP IP/OBS MODERATE 35: CPT | Performed by: PSYCHIATRY & NEUROLOGY

## 2021-06-10 PROCEDURE — 6360000002 HC RX W HCPCS: Performed by: INTERNAL MEDICINE

## 2021-06-10 PROCEDURE — 6370000000 HC RX 637 (ALT 250 FOR IP): Performed by: INTERNAL MEDICINE

## 2021-06-10 PROCEDURE — 36415 COLL VENOUS BLD VENIPUNCTURE: CPT

## 2021-06-10 PROCEDURE — 97116 GAIT TRAINING THERAPY: CPT

## 2021-06-10 PROCEDURE — 97530 THERAPEUTIC ACTIVITIES: CPT

## 2021-06-10 PROCEDURE — 2580000003 HC RX 258: Performed by: INTERNAL MEDICINE

## 2021-06-10 PROCEDURE — 80048 BASIC METABOLIC PNL TOTAL CA: CPT

## 2021-06-10 PROCEDURE — 6370000000 HC RX 637 (ALT 250 FOR IP): Performed by: PSYCHIATRY & NEUROLOGY

## 2021-06-10 PROCEDURE — 85025 COMPLETE CBC W/AUTO DIFF WBC: CPT

## 2021-06-10 PROCEDURE — 2580000003 HC RX 258: Performed by: PSYCHIATRY & NEUROLOGY

## 2021-06-10 RX ORDER — HYDRALAZINE HYDROCHLORIDE 10 MG/1
10 TABLET, FILM COATED ORAL EVERY 12 HOURS PRN
Status: DISCONTINUED | OUTPATIENT
Start: 2021-06-10 | End: 2021-06-11

## 2021-06-10 RX ORDER — AMLODIPINE BESYLATE 2.5 MG/1
2.5 TABLET ORAL DAILY
Qty: 30 TABLET | Refills: 3 | DISCHARGE
Start: 2021-06-10 | End: 2021-06-16 | Stop reason: HOSPADM

## 2021-06-10 RX ORDER — OXYCODONE HYDROCHLORIDE AND ACETAMINOPHEN 5; 325 MG/1; MG/1
1 TABLET ORAL EVERY 6 HOURS PRN
Qty: 13 TABLET | Refills: 0 | Status: SHIPPED | OUTPATIENT
Start: 2021-06-10 | End: 2021-06-17

## 2021-06-10 RX ORDER — LEVODOPA AND CARBIDOPA 145; 36.25 MG/1; MG/1
1 CAPSULE, EXTENDED RELEASE ORAL 2 TIMES DAILY PRN
Qty: 90 CAPSULE | DISCHARGE
Start: 2021-06-10

## 2021-06-10 RX ORDER — AMLODIPINE BESYLATE 2.5 MG/1
2.5 TABLET ORAL DAILY
Status: DISCONTINUED | OUTPATIENT
Start: 2021-06-10 | End: 2021-06-13

## 2021-06-10 RX ORDER — ZIPRASIDONE MESYLATE 20 MG/ML
10 INJECTION, POWDER, LYOPHILIZED, FOR SOLUTION INTRAMUSCULAR ONCE
Status: COMPLETED | OUTPATIENT
Start: 2021-06-10 | End: 2021-06-10

## 2021-06-10 RX ORDER — LORAZEPAM 0.5 MG/1
0.5 TABLET ORAL EVERY 12 HOURS PRN
Status: DISCONTINUED | OUTPATIENT
Start: 2021-06-10 | End: 2021-06-16 | Stop reason: HOSPADM

## 2021-06-10 RX ORDER — ZIPRASIDONE MESYLATE 20 MG/ML
10 INJECTION, POWDER, LYOPHILIZED, FOR SOLUTION INTRAMUSCULAR EVERY 12 HOURS PRN
Status: DISCONTINUED | OUTPATIENT
Start: 2021-06-10 | End: 2021-06-11

## 2021-06-10 RX ORDER — LORAZEPAM 0.5 MG/1
0.5 TABLET ORAL 3 TIMES DAILY
Qty: 21 TABLET | Refills: 0 | Status: SHIPPED | OUTPATIENT
Start: 2021-06-10 | End: 2021-06-16 | Stop reason: HOSPADM

## 2021-06-10 RX ADMIN — LORAZEPAM 0.5 MG: 0.5 TABLET ORAL at 04:00

## 2021-06-10 RX ADMIN — WATER 1.2 ML: 1 INJECTION INTRAMUSCULAR; INTRAVENOUS; SUBCUTANEOUS at 20:45

## 2021-06-10 RX ADMIN — AMLODIPINE BESYLATE 2.5 MG: 2.5 TABLET ORAL at 22:21

## 2021-06-10 RX ADMIN — ZIPRASIDONE MESYLATE 10 MG: 20 INJECTION, POWDER, LYOPHILIZED, FOR SOLUTION INTRAMUSCULAR at 20:45

## 2021-06-10 RX ADMIN — LORAZEPAM 0.5 MG: 0.5 TABLET ORAL at 12:33

## 2021-06-10 RX ADMIN — LORAZEPAM 0.5 MG: 0.5 TABLET ORAL at 08:49

## 2021-06-10 RX ADMIN — SODIUM CHLORIDE, PRESERVATIVE FREE 10 ML: 5 INJECTION INTRAVENOUS at 08:50

## 2021-06-10 RX ADMIN — ESCITALOPRAM OXALATE 20 MG: 10 TABLET ORAL at 08:49

## 2021-06-10 RX ADMIN — BACLOFEN 10 MG: 10 TABLET ORAL at 15:06

## 2021-06-10 RX ADMIN — BACLOFEN 10 MG: 10 TABLET ORAL at 08:49

## 2021-06-10 RX ADMIN — ENOXAPARIN SODIUM 30 MG: 30 INJECTION SUBCUTANEOUS at 08:50

## 2021-06-10 RX ADMIN — LORAZEPAM 0.5 MG: 0.5 TABLET ORAL at 18:02

## 2021-06-10 RX ADMIN — BACLOFEN 10 MG: 10 TABLET ORAL at 22:18

## 2021-06-10 ASSESSMENT — PAIN SCALES - GENERAL
PAINLEVEL_OUTOF10: 0
PAINLEVEL_OUTOF10: 0

## 2021-06-10 NOTE — CARE COORDINATION
Social work:  Writer informed [address] patient has not had behaviors overnight, they would like to watch patient another night and potentially admit on Friday. Also spoke with [facility] and [facility], both are reviewing. [facility] did onsite visit, case will need to be reviewed by corporate before confirming they can accept. [facility] is considering, but location is too far for patient. Writer also sent referral to [facility] to review- however, concern is if patient could tolerate 3 hours of therapy at day.

## 2021-06-10 NOTE — PLAN OF CARE
Problem: Falls - Risk of:  Goal: Will remain free from falls  Description: Will remain free from falls  6/9/2021 2152 by Vitor Rivera RN  Outcome: Ongoing  6/9/2021 1015 by Tulio Winter RN  Outcome: Ongoing  Goal: Absence of physical injury  Description: Absence of physical injury  Outcome: Ongoing     Problem: Mental Status - Impaired:  Goal: Mental status will improve  Description: Mental status will improve  6/9/2021 2152 by Vitor Rivera RN  Outcome: Ongoing  6/9/2021 1015 by Tulio Winter RN  Outcome: Ongoing     Problem: Coping:  Goal: Ability to remain calm will improve  Description: Ability to remain calm will improve  6/9/2021 2152 by Vitor Rivera RN  Outcome: Ongoing  6/9/2021 1015 by Tulio Winter RN  Outcome: Ongoing     Problem: Safety:  Goal: Ability to remain free from injury will improve  Description: Ability to remain free from injury will improve  6/9/2021 2152 by Vitor Rivera RN  Outcome: Ongoing  6/9/2021 1015 by Tulio Winter RN  Outcome: Ongoing     Problem: Self-Care:  Goal: Ability to participate in self-care as condition permits will improve  Description: Ability to participate in self-care as condition permits will improve  Outcome: Ongoing     Problem: Skin Integrity:  Goal: Will show no infection signs and symptoms  Description: Will show no infection signs and symptoms  Outcome: Ongoing  Goal: Absence of new skin breakdown  Description: Absence of new skin breakdown  Outcome: Ongoing     Problem: IP BALANCE  Goal: LTG - patient will maintain standing balance to allow for completion of daily activities  Outcome: Ongoing  Goal: LTG - PATIENT WILL MAINTAIN SITTING POSITION WITH APPROPRIATE MID-LINE ORIENTATION  Outcome: Ongoing     Problem: IP MOBILITY  Goal: LTG - patient will ambulate household distance  Outcome: Ongoing  Goal: STG - Patient will ambulate  Outcome: Ongoing     Problem: SAFETY  Goal: LTG - Patient will demonstrate safety requirements appropriate to situation/environment  Outcome: Ongoing  Goal: LTG - patient will utilize safety techniques  Outcome: Ongoing     Problem: IP TRANSFERS  Goal: LTG - patient will transfer to commode  Outcome: Ongoing  Goal: STG - transfer from bed to chair  Outcome: Ongoing  Goal: STG - patient will perform bed mobility  Outcome: Ongoing

## 2021-06-10 NOTE — PROGRESS NOTES
lumpectomy (Right); Tonsillectomy; Sigmoidoscopy; Colonoscopy; other surgical history; Mastectomy (Right, 08/23/2016); and other surgical history (12/13/2016). Restrictions  Restrictions/Precautions  Restrictions/Precautions: General Precautions, Fall Risk  Required Braces or Orthoses?: No  Position Activity Restriction  Other position/activity restrictions: up with assist, ALARMS, LUE IV, has telesitter  Subjective   General  Chart Reviewed: Yes  Additional Pertinent Hx: [place], Parkinson's disease, HTN, tremor  Response To Previous Treatment: Patient with no complaints from previous session. Family / Caregiver Present: Yes  Subjective  Subjective: Pt agreeable to PT  General Comment  Comments: FLORENTIN Levine PT  Pain Screening  Patient Currently in Pain: Denies  Vital Signs  Patient Currently in Pain: Denies       Orientation  Orientation  Overall Orientation Status: Impaired  Orientation Level: Disoriented to place; Disoriented to situation  Cognition      Objective   Bed mobility  Comment: Patient up in recliner upon therapists arrival.  Patient was attempting to rise with family member at that time. Transfers  Sit to Stand: Moderate Assistance  Stand to sit: Moderate Assistance  Bed to Chair: Unable to assess  Stand Pivot Transfers: Moderate Assistance  Lateral Transfers: Maximum Assistance  Comment: Patient requires maximum verbal and tactile cues for for proper placement of assistive device RW. Patient attempts to pull self up using RW, therapist with education and hand held assist for proper positioning of hands on arms of recliner for safe transfer techniques. Patient with notable hand tremor. Ambulation  Ambulation?: Yes  Ambulation 1  Surface: level tile  Device: Rolling Walker  Assistance: Moderate assistance  Quality of Gait: Patient requires increased assist for proper use of assistive device to promote ambulation. Patient with slow shuffling gait patter and greater step length on L side noted. Patient requires increased assist for turn management with assistive device. Distance: 20 feet x2 to and from bathroom  Stairs/Curb  Stairs?: No  Neuromuscular Education  NDT Treatment: Lower extremity; Sitting;Standing  Neuromuscular Comments: Patient worked on seated BIG movements to promote seated postural balance and increased available ROM to assist with activities of daily living. Balance  Comments: w/ Rw for standing balance  Exercises  Comments: Ed pt on functional mobility, posture, importance of being up & OOB to regain strength, & prevention of sedentary complications, & safety with all functional mobility. Patient works on sit to stands to promote safety and stability in functional activities. Patient and caregiver (daughter) educated on the importance of tactle and verbal cues to promote proper sequencing in ambulation techniques. Patient worked on neuro-muscular re-education to promote balance and stability in mobility tasks. AM-PAC Score  AM-PAC Inpatient Mobility Raw Score : 11 (06/10/21 1158)  AM-PAC Inpatient T-Scale Score : 33.86 (06/10/21 1158)  Mobility Inpatient CMS 0-100% Score: 72.57 (06/10/21 1158)  Mobility Inpatient CMS G-Code Modifier : CL (06/10/21 1158)          Goals  Short term goals  Time Frame for Short term goals: 12 visits  Short term goal 1: Inc bed-mobility & transfers to independent to enable pt to safely get in/OOB & chair  Short term goal 2: Inc gait to amb 150ft or > indep w/ RW to enable pt to return to previous level of independence  Short term goal 3:  Inc strength to [facility] & standing balance to good with device to facilitate pt independence for performance of ADL's & functional mobility, & reduce fall risk  Short term goal 4: Pt able to tolerate 30-40 min of activity to include 15-20 reps of ex, NMR & functional mobility including 5 minutes of standing with device to facilitate activity tolerance to [facility]  Short term goal 5: Ed pt on home ex's, safety & energy

## 2021-06-10 NOTE — FLOWSHEET NOTE
Patient + daughter Julianna Brown was present. Patient and daughter shared as to the patient's medical problems years dealing with Parkinsons. Daughter states patient is to have a brain surgery for stimulation of the brain. Julianna Alert states about patient's many medical problems, states major rehab before able to have the brain surgery. Patient and Julianna Alert states about patient's  and his medical conditions. States worries, fears, frustrations. Shared a bit about her anger with God.  shared in presence, listening, prayer. Follow up as needed. 06/10/21 1251   Encounter Summary   Services provided to: Patient and family together   Referral/Consult From: Palliative Care;Rounding   Continue Visiting   (6-10-21)   Complexity of Encounter High   Length of Encounter 30 minutes   Spiritual Assessment Completed Yes   Routine   Type Follow up   Assessment Passive; Approachable; Anxious   Intervention Active listening;Explored feelings, thoughts, concerns;Prayer;Sustaining presence/ Ministry of presence; Discussed illness/injury and it's impact; Discussed belief system/Nondenominational practices/carmella   Outcome Expressed gratitude;Receptive;Engaged in conversation;Expressed feelings/needs/concerns

## 2021-06-10 NOTE — PLAN OF CARE
Problem: Falls - Risk of:  Goal: Will remain free from falls  Description: Will remain free from falls  6/10/2021 0859 by Yen Alvarez RN  Outcome: Ongoing  Pt fall risk, fall band present, falling star, safety alarm activated and in use as needed. Hourly rounding performed. Pt encouraged to use call light. See Ny Ruiz fall risk assessment.

## 2021-06-10 NOTE — PROGRESS NOTES
extremity   Reflex function Intact 2+ DTR and symmetric. Negative Babinski     Gait                  Not tested         No results found for: LDLCALC, LDLCHOLESTEROL, LDLDIRECT  No components found for: CHLPL  No results found for: TRIG  No results found for: HDL  No results found for: LDLCALC  No results found for: LABVLDL  No results found for: LABA1C  No results found for: EAG  No results found for: [id number]   Neurological work up:  CT head  CTA head and neck  MRI brain 10/24/2020 with sequela of chronic microvascular disease and volume loss. Dolichoectasia of posterior anterior circulation. 2 D echo     Assessment and Recommendations:   Patient with advanced Parkinson's disease, diagnosed in 1996 with significant ongoing phenomena and drug-induced dyskinesia. The patient is now back to the baseline. She is more awake. Recommend continued dose of Rytary 5 times a day    Okay to discharge from neurological standpoint outpatient follow-up with your neurologist and neurosurgeon as scheduled    No further recommendation at this time. We will sign off. Please call with questions. Thank you for the consult. Dina Barraza MD  Neurology    This note is created with the assistance of a speech-recognition program. While intending to generate a document that actually reflects the content of the visit, the document can still have some errors including those of syntax and sound a- like substitutions which may escape proofreading. In such instances, actual meaning can be extrapolated by contextual derivation.

## 2021-06-10 NOTE — CARE COORDINATION
Social work: [facility] completed on site visit, patient not appropriate at this time- would be more appropriate for ARU after surgery at [facility]. As previously charted, [facility] wants to watch pt overnight before making determination on acceptance. Also received call from [facility], pt denied.

## 2021-06-10 NOTE — PROGRESS NOTES
Occupational Therapy  Facility/Department: [facility] PROGRESSIVE CARE  Daily Treatment Note  NAME: Helio Doty  : 1945  MRN: [id number]    Date of Service: 6/10/2021    Discharge Recommendations:  Patient would benefit from continued therapy after discharge     Pt currently functioning below baseline. Would suggest additional therapy at time of discharge to maximize long term outcomes and prevent re-admission. Please refer to AM-PAC score for current level of function. Assessment   Performance deficits / Impairments: Decreased functional mobility ; Decreased ADL status; Decreased strength;Decreased safe awareness;Decreased cognition;Decreased endurance;Decreased posture;Decreased balance;Decreased coordination  Prognosis: Fair  OT Education: OT Role;Plan of Care;Precautions; ADL Adaptive Strategies;Transfer Training;Energy Conservation; Family Education  REQUIRES OT FOLLOW UP: Yes  Activity Tolerance  Activity Tolerance: Patient limited by fatigue;Patient Tolerated treatment well  Safety Devices  Safety Devices in place: Yes  Type of devices: Call light within reach;Nurse notified;Gait belt;Patient at risk for falls; Left in chair;Chair alarm in place         Patient Diagnosis(es): The encounter diagnosis was Parkinson's disease ([facility] [address] has a past medical history of Cancer [facility]), Degenerative joint disease (DJD) of lumbar spine, Hearing loss, Hypertension, Osteoporosis, senile, Parkinson's disease ([facility] [address] Parkinson's disease ([facility] [address] and Tremor. has a past surgical history that includes Breast lumpectomy (Right); Tonsillectomy; Sigmoidoscopy; Colonoscopy; other surgical history; Mastectomy (Right, 2016); and other surgical history (2016).     Restrictions  Restrictions/Precautions  Restrictions/Precautions: General Precautions, Fall Risk  Required Braces or Orthoses?: No  Position Activity Restriction  Other position/activity restrictions: up with assist, ALARMS, LUE IV  Subjective   General  Chart Reviewed: Yes  Patient assessed for rehabilitation services?: Yes  Response to previous treatment: Patient with no complaints from previous session  Family / Caregiver Present: Yes ( and daughter)  Subjective  Subjective: Patient and  pleasant and agreeable to OT treatment session. Orientation  Orientation  Orientation Level: Oriented to person;Oriented to situation;Oriented to place; Disoriented to time  Objective    ADL  Grooming: Setup;Contact guard assistance (CGA standing at sink for patient to complete oral hygiene. Patient able to set up task and complete with increased time. noted less tremors this date compared to 6/9's treatment session)  Toileting: Maximum assistance (Max A for breif change and BM hygiene)        Balance  Sitting Balance: Stand by assistance    Standing Balance: Contact guard assistance  Standing Balance  Time: standing balance ~8 minutes  Activity: grooming at sink, toileting routine/hygiene  Comment: with RW    Functional Mobility  Functional - Mobility Device: Rolling Walker  Activity: To/from bathroom; Other  Assist Level: Contact guard assistance  Functional Mobility Comments: Patient completed functional mobility around room and to/from bathroom with CGA and minimal cues for staying inside RW, pacing, posture, and environment scanning. Patient tolerated well. Toilet Transfers  Toilet - Technique: Ambulating  Equipment Used: Grab bars  Toilet Transfer: Contact guard assistance  Toilet Transfers Comments: verbal cues for use of grab bars    Bed mobility  Comment: Patient up in chair upon arrival    Transfers  Sit to stand: Contact guard assistance  Stand to sit: Contact guard assistance  Transfer Comments: verbal/tactile cues for hand placements, controlled sit<>stand      Cognition  Overall Cognitive Status: Exceptions  Arousal/Alertness: Appropriate responses to stimuli;Delayed responses to stimuli  Following Commands:  Follows multistep commands with repitition; Follows multistep commands with increased time  Attention Span: Attends with cues to redirect  Memory: Decreased recall of precautions  Safety Judgement: Decreased awareness of need for assistance;Decreased awareness of need for safety  Problem Solving: Decreased awareness of errors;Assistance required to identify errors made;Assistance required to correct errors made;Assistance required to implement solutions  Insights: Decreased awareness of deficits  Initiation: Requires cues for some  Sequencing: Requires cues for some  Cognition Comment: patient is cognitively doing much better this date, able to follow out commands without constant cues.  Patient needs burns direction given, very helpful when daughter and  are in room      Plan   Plan  Times per week: 4-5x/week  Times per day: Daily  Current Treatment Recommendations: Strengthening, Balance Training, Functional Mobility Training, Endurance Training, Safety Education & Training, Self-Care / ADL, Patient/Caregiver Education & Training, Equipment Evaluation, Education, & procurement, Cognitive Reorientation, Cognitive/Perceptual Training, Positioning, Neuromuscular Re-education  AM-PAC Score        AM-[facility] Inpatient Daily Activity Raw Score: 14 (06/10/21 1240)  AM-PAC Inpatient ADL T-Scale Score : 33.39 (06/10/21 1240)  ADL Inpatient CMS 0-100% Score: 59.67 (06/10/21 1240)  ADL Inpatient CMS G-Code Modifier : CK (06/10/21 1240)    Goals  Short term goals  Time Frame for Short term goals: by discharge, pt will  Short term goal 1: demo min A with ADL transfers and functional mob in room distances with min cues for good safety and approp AD/DME  Short term goal 2: demo min A with bed mobility with rails/controls and min cues for safety  Short term goal 3: demo mod A with toileting routine with min cues for good safety and sequencing  Short term goal 4: demo min A with self feeding and grooming tasks following set up at approp level with min cues for sequencing/follow through  Short term goal 5: demo min A with UB ADLs with min cues for initiation/sequencing  Patient Goals   Patient goals : not stated by pt       Therapy Time   Individual Concurrent Group Co-treatment   Time In 1200         Time Out 1228 (+10 minutes edcuation and gathering built up foam handles for patient)         Minutes 28           Upon writer exit, call light within reach, pt retired to chair. All lines intact and patient positioned comfortably. Chair alarm in place. All patient needs addressed prior to ending therapy session. Chart reviewed prior to treatment and patient is agreeable for therapy. RN reports patient is medically stable for therapy treatment this date.   MERCY Santillan/WILLIAM

## 2021-06-10 NOTE — PROGRESS NOTES
Speech Language Pathology  Speech Language Pathology  [address]    Dysphagia Treatment Note    Date: 6/10/2021  Patients Name: Coleen Rausch  MRN: [id number]  Diagnosis: dysphagia  Patient Active Problem List   Diagnosis Code    Parkinson's disease ([facility] [address] G20    Spondylosis of lumbar region without myelopathy or radiculopathy M47.816    Acute cystitis N30.00    Parkinson's disease (tremor, stiffness, slow motion, unstable posture) ([address] G20       Pain: 0/10    Dysphagia Treatment  Treatment [id number]    Subjective: [x] Alert [x] Cooperative     [] Confused     [] Agitated    [] Lethargic    Objective/Assessment:    Pt. Seen for follow up to assess for diet upgrade. Pt. Awake alert, sitting up in chair. Per patient and daughter, she has been eating Regular food brought in by family, however remains on Puree diet per recommendations. ST provided Regular solids and thin liquid trials for patient. Adequate mastication was noted with no oral loss and no oral stasis. Pt. With no overt s/s of aspiration noted with Regular and thin trials. Patient presents with probable safe swallow for Regular diet with thin liquids as evidenced by no overt s/s of aspiration noted with consistencies tested. Recommend small sips and bites, only feed when alert and awake and upright at 90 degrees for all PO intake. Recommend close monitoring for overt/clinical s/s of aspiration and D/C PO intake and complete Modified Barium Swallow Study should they occur. Results and recommendations reported to RN. Plan:  [x] Continue  services    [] Discharge from :        Discharge recommendations: [] Inpatient Rehab   [] [facility]   [] Outpatient Therapy  [] Follow up at trauma clinic   [x] Other: Further therapy recommended at discharge. Treatment completed by:  Irene Doyle, ANNIKA, M.S. [address] [facility]

## 2021-06-11 LAB
ABSOLUTE EOS #: 0.16 K/UL (ref 0–0.44)
ABSOLUTE IMMATURE GRANULOCYTE: 0.05 K/UL (ref 0–0.3)
ABSOLUTE LYMPH #: 1 K/UL (ref 1.1–3.7)
ABSOLUTE MONO #: 1.17 K/UL (ref 0.1–1.2)
ANION GAP SERPL CALCULATED.3IONS-SCNC: 11 MMOL/L (ref 9–17)
BASOPHILS # BLD: 1 % (ref 0–2)
BASOPHILS ABSOLUTE: 0.06 K/UL (ref 0–0.2)
BUN BLDV-MCNC: 17 MG/DL (ref 8–23)
BUN/CREAT BLD: 40 (ref 9–20)
CALCIUM SERPL-MCNC: 9.1 MG/DL (ref 8.6–10.4)
CHLORIDE BLD-SCNC: 106 MMOL/L (ref 98–107)
CO2: 26 MMOL/L (ref 20–31)
CREAT SERPL-MCNC: 0.43 MG/DL (ref 0.5–0.9)
DIFFERENTIAL TYPE: ABNORMAL
EOSINOPHILS RELATIVE PERCENT: 2 % (ref 1–4)
GFR AFRICAN AMERICAN: >60 ML/MIN
GFR NON-AFRICAN AMERICAN: >60 ML/MIN
GFR SERPL CREATININE-BSD FRML MDRD: ABNORMAL ML/MIN/{1.73_M2}
GFR SERPL CREATININE-BSD FRML MDRD: ABNORMAL ML/MIN/{1.73_M2}
GLUCOSE BLD-MCNC: 92 MG/DL (ref 70–99)
HCT VFR BLD CALC: 37.1 % (ref 36.3–47.1)
HEMOGLOBIN: 11.1 G/DL (ref 11.9–15.1)
IMMATURE GRANULOCYTES: 1 %
LYMPHOCYTES # BLD: 9 % (ref 24–43)
MCH RBC QN AUTO: 30.2 PG (ref 25.2–33.5)
MCHC RBC AUTO-ENTMCNC: 29.9 G/DL (ref 28.4–34.8)
MCV RBC AUTO: 101.1 FL (ref 82.6–102.9)
MONOCYTES # BLD: 11 % (ref 3–12)
NRBC AUTOMATED: 0 PER 100 WBC
PDW BLD-RTO: 14 % (ref 11.8–14.4)
PLATELET # BLD: 311 K/UL (ref 138–453)
PLATELET ESTIMATE: ABNORMAL
PMV BLD AUTO: 9.4 FL (ref 8.1–13.5)
POTASSIUM SERPL-SCNC: 3.5 MMOL/L (ref 3.7–5.3)
RBC # BLD: 3.67 M/UL (ref 3.95–5.11)
RBC # BLD: ABNORMAL 10*6/UL
SEG NEUTROPHILS: 76 % (ref 36–65)
SEGMENTED NEUTROPHILS ABSOLUTE COUNT: 8.21 K/UL (ref 1.5–8.1)
SODIUM BLD-SCNC: 143 MMOL/L (ref 135–144)
WBC # BLD: 10.7 K/UL (ref 3.5–11.3)
WBC # BLD: ABNORMAL 10*3/UL

## 2021-06-11 PROCEDURE — 80048 BASIC METABOLIC PNL TOTAL CA: CPT

## 2021-06-11 PROCEDURE — 6360000002 HC RX W HCPCS: Performed by: INTERNAL MEDICINE

## 2021-06-11 PROCEDURE — 85025 COMPLETE CBC W/AUTO DIFF WBC: CPT

## 2021-06-11 PROCEDURE — 2060000000 HC ICU INTERMEDIATE R&B

## 2021-06-11 PROCEDURE — 6360000002 HC RX W HCPCS: Performed by: PSYCHIATRY & NEUROLOGY

## 2021-06-11 PROCEDURE — 6370000000 HC RX 637 (ALT 250 FOR IP): Performed by: PSYCHIATRY & NEUROLOGY

## 2021-06-11 PROCEDURE — 2580000003 HC RX 258: Performed by: PSYCHIATRY & NEUROLOGY

## 2021-06-11 PROCEDURE — 2580000003 HC RX 258: Performed by: INTERNAL MEDICINE

## 2021-06-11 PROCEDURE — 99233 SBSQ HOSP IP/OBS HIGH 50: CPT | Performed by: PSYCHIATRY & NEUROLOGY

## 2021-06-11 PROCEDURE — 6370000000 HC RX 637 (ALT 250 FOR IP): Performed by: INTERNAL MEDICINE

## 2021-06-11 PROCEDURE — 36415 COLL VENOUS BLD VENIPUNCTURE: CPT

## 2021-06-11 RX ORDER — HYDRALAZINE HYDROCHLORIDE 20 MG/ML
5 INJECTION INTRAMUSCULAR; INTRAVENOUS EVERY 6 HOURS PRN
Status: DISCONTINUED | OUTPATIENT
Start: 2021-06-11 | End: 2021-06-16 | Stop reason: HOSPADM

## 2021-06-11 RX ORDER — BACLOFEN 10 MG/1
5 TABLET ORAL 2 TIMES DAILY
Status: DISCONTINUED | OUTPATIENT
Start: 2021-06-11 | End: 2021-06-16 | Stop reason: HOSPADM

## 2021-06-11 RX ORDER — LORAZEPAM 2 MG/ML
0.5 INJECTION INTRAMUSCULAR EVERY 12 HOURS PRN
Status: DISCONTINUED | OUTPATIENT
Start: 2021-06-11 | End: 2021-06-16 | Stop reason: HOSPADM

## 2021-06-11 RX ORDER — ZIPRASIDONE MESYLATE 20 MG/ML
10 INJECTION, POWDER, LYOPHILIZED, FOR SOLUTION INTRAMUSCULAR ONCE
Status: COMPLETED | OUTPATIENT
Start: 2021-06-11 | End: 2021-06-11

## 2021-06-11 RX ORDER — POTASSIUM CHLORIDE 7.45 MG/ML
10 INJECTION INTRAVENOUS
Status: COMPLETED | OUTPATIENT
Start: 2021-06-11 | End: 2021-06-11

## 2021-06-11 RX ORDER — QUETIAPINE FUMARATE 25 MG/1
25 TABLET, FILM COATED ORAL NIGHTLY
Status: DISCONTINUED | OUTPATIENT
Start: 2021-06-11 | End: 2021-06-16 | Stop reason: HOSPADM

## 2021-06-11 RX ADMIN — AMLODIPINE BESYLATE 2.5 MG: 2.5 TABLET ORAL at 10:21

## 2021-06-11 RX ADMIN — WATER 1.2 ML: 1 INJECTION INTRAMUSCULAR; INTRAVENOUS; SUBCUTANEOUS at 20:12

## 2021-06-11 RX ADMIN — ESCITALOPRAM OXALATE 20 MG: 10 TABLET ORAL at 10:24

## 2021-06-11 RX ADMIN — SODIUM CHLORIDE, PRESERVATIVE FREE 10 ML: 5 INJECTION INTRAVENOUS at 10:23

## 2021-06-11 RX ADMIN — LORAZEPAM 0.5 MG: 0.5 TABLET ORAL at 10:21

## 2021-06-11 RX ADMIN — LORAZEPAM 0.5 MG: 2 INJECTION, SOLUTION INTRAMUSCULAR; INTRAVENOUS at 01:57

## 2021-06-11 RX ADMIN — BACLOFEN 10 MG: 10 TABLET ORAL at 10:22

## 2021-06-11 RX ADMIN — POTASSIUM CHLORIDE 10 MEQ: 10 INJECTION, SOLUTION INTRAVENOUS at 22:41

## 2021-06-11 RX ADMIN — ENOXAPARIN SODIUM 30 MG: 30 INJECTION SUBCUTANEOUS at 10:22

## 2021-06-11 RX ADMIN — HYDRALAZINE HYDROCHLORIDE 5 MG: 20 INJECTION INTRAMUSCULAR; INTRAVENOUS at 16:14

## 2021-06-11 RX ADMIN — ZIPRASIDONE MESYLATE 10 MG: 20 INJECTION, POWDER, LYOPHILIZED, FOR SOLUTION INTRAMUSCULAR at 20:12

## 2021-06-11 RX ADMIN — ZIPRASIDONE MESYLATE 10 MG: 20 INJECTION, POWDER, LYOPHILIZED, FOR SOLUTION INTRAMUSCULAR at 13:15

## 2021-06-11 RX ADMIN — SODIUM CHLORIDE, PRESERVATIVE FREE 10 ML: 5 INJECTION INTRAVENOUS at 22:20

## 2021-06-11 RX ADMIN — HYDRALAZINE HYDROCHLORIDE 10 MG: 10 TABLET, FILM COATED ORAL at 04:54

## 2021-06-11 RX ADMIN — POTASSIUM CHLORIDE 10 MEQ: 10 INJECTION, SOLUTION INTRAVENOUS at 23:42

## 2021-06-11 RX ADMIN — LORAZEPAM 0.5 MG: 0.5 TABLET ORAL at 18:33

## 2021-06-11 ASSESSMENT — PAIN SCALES - GENERAL: PAINLEVEL_OUTOF10: 0

## 2021-06-11 NOTE — PROGRESS NOTES
Occupational Therapy  DATE: 2021    NAME: Rodrigo Cota  MRN: [id number]   : 1945  [facility]  Occupational Therapy Not Seen Note    Patient not available for Occupational Therapy due to:    [] Testing:    [] Hemodialysis    [] Cancelled by RN:    []Refusal by Patient:    [] Surgery:     [] Intubation:     [] Pain Medication:    [] Sedation:     [] Spine Precautions :    [] Medical Instability:    [x] Other: Patient combative with staff.     MERCY Busby/WILLIAM

## 2021-06-11 NOTE — PROGRESS NOTES
[facility] PALLIATIVE CARE NURSING ASSESSMENT    Patient: Yuliya Azevedo  Room: 1009/1009-01    Reason For Consult   Goals of care evaluation  Distress management  Guidance and support  Facilitate communications  Assistance in coordinating care    Code Status: [facility]      Impression: Yuliya Azevedo is a 68y.o. year old female  has a past medical history of Cancer ([address] Degenerative joint disease (DJD) of lumbar spine, Hearing loss, Hypertension, Osteoporosis, senile, Parkinson's disease ([address] Parkinson's disease ([address] and Tremor. .  Currently hospitalized for the management of Parkinsons Disease. The Palliative Care Team is following to assist with goals of care/family support. Vital Signs  Blood pressure (!) 179/87, pulse 91, temperature 97.3 °F (36.3 °C), temperature source Oral, resp. rate 18, height 5' (1.524 m), weight 90 lb 4 oz (40.9 kg), SpO2 99 %. Patient Active Problem List   Diagnosis    Parkinson's disease ([address]    Spondylosis of lumbar region without myelopathy or radiculopathy    Acute cystitis    Parkinson's disease (tremor, stiffness, slow motion, unstable posture) ([address]       Palliative Interaction: I visited patient today. She is confused and combative. Family has met with [facility] earlier this week and declined hospice services stating they would like to pursue treatment at [facility]. Family expressed interested in [address] at that time. I attempted to talk with patient and she yelled at me and trying to throw her blankets at me. She is having bad tremors presently. Pt's  Winsome Stevenson and daughter Melanie Neighbor showed up to the room. They state they cannot take her home to take care of her. They are interested in long term care and would like to speak to social work about this. Daughter states, Jeancarlos Santos hermilo, we can take her out and take her to appointments if needed\". I called social work and they will meet to discuss today. Updated discharge planner as well.      Emotional support offered to family. Will continue to follow for support. Goals/Plan of care  Education/support to family  Education/support to patient  Discharge planning/helping to coordinate care  Communications with primary service  Providing support for coping/adaptation/distress of family  Discussing meaning/purpose   Continue with current plan of care  Clarification of medical condition to patient and family  Code status clarified: [facility]  Validating patient/family distress  Continued communication updates  Recognizing, reflecting, and empathizing with family members' anticipatory grief  Pt confused and combative upon my visit. Family is opting for long term care for patient. Emotional support offered. Family meeting with social work today to discuss LTC.       Palliative Care Coordinator  [facility] FLORENTIN Carney, KAYDEN ZHU [facility] Office: [phone] Office: [phone]    For Symptom Management Clinic scheduling please call [phone]

## 2021-06-11 NOTE — PLAN OF CARE
Problem: Falls - Risk of:  Goal: Will remain free from falls  Description: Will remain free from falls  Note: Fall risk assessment completed. Patient instructed to use call light. Bed locked and in lowest position, side rails up 2/4, call light and bedside table within reach, clutter removed, and non-skid footwear on when pt out of bed. Hourly rounds will continue.

## 2021-06-11 NOTE — PROGRESS NOTES
Subjective:     Follow-up Parkinson  Doing fairly well about the same still with tremors no rigidity alert oriented  ROS  No fever no chills no GI/ complaints no cardiopulmonary complaints no TIA no bleeding no headache no sore throat no skin  physical exam  General Appearance: in no acute distress  Skin: warm and dry, no rash or erythema  Head: normocephalic and atraumatic  Eyes: pupils equal, round, and reactive to light and sclera anicteric     Neck: neck supple and non tender without mass   Pulmonary/Chest: clear to auscultation bilaterally- no wheezes, rales or rhonchi, normal air movement, no respiratory distress  Cardiovascular: normal rate, regular rhythm, normal S1 and S2, no gallops, intact distal pulses and no carotid bruits  Abdomen: soft, non-tender, non-distended, normal bowel sounds, no masses or organomegaly  Extremities: no edema and pulses no Homans    Neurologic: Awake alert x2+ tremors positive rigidity hallucinations and agitation    BP (!) 154/79   Pulse 104   Temp 98.3 °F (36.8 °C) (Oral)   Resp 18   Ht 5' (1.524 m)   Wt 100 lb 2 oz (45.4 kg)   SpO2 97%   BMI 19.55 kg/m²     CBC:   Lab Results   Component Value Date    WBC 8.2 06/10/2021    RBC 3.56 06/10/2021    HGB 10.8 06/10/2021    HCT 35.7 06/10/2021    .3 06/10/2021    MCH 30.3 06/10/2021    MCHC 30.3 06/10/2021    RDW 14.0 06/10/2021     06/10/2021    MPV 9.7 06/10/2021     BMP:    Lab Results   Component Value Date     06/10/2021    K 3.5 06/10/2021     06/10/2021    CO2 24 06/10/2021    BUN 11 06/10/2021    LABALBU 3.8 06/07/2021    CREATININE 0.43 06/10/2021    CALCIUM 8.9 06/10/2021    GFRAA >60 06/10/2021    LABGLOM >60 06/10/2021    GLUCOSE 102 06/10/2021        Assessment:  Parkinson's with encephalopathy and psychosis  Hypernatremia is much better    Plan:    Meds labs reviewed no evidence of any infection her urine analysis is completely normal medically ready for discharge await skilled nursing facility      Elver Bush MD MD  8:04 PM

## 2021-06-11 NOTE — PROGRESS NOTES
Patient pulled out IV. Writer attempted to place new IV twice but no success. Patient starting to get irritable again, will ask someone else to attempt after she calms down some more. Will monitor.

## 2021-06-11 NOTE — PROGRESS NOTES
Psych would like doctor to doctor consult. Dr. Oli Stanley updated and agreeable ( Dr. Nayeli Rojo phone number given to Dr. Oli Stanley)    Patient resting in bed with eyes closed at this time.

## 2021-06-11 NOTE — PLAN OF CARE
Problem: Falls - Risk of:  Goal: Will remain free from falls  Description: Will remain free from falls  6/11/2021 1055 by Misty Santillan RN  Outcome: Ongoing  Pt fall risk, fall band present, falling star, safety alarm activated and in use as needed. Hourly rounding performed. Pt encouraged to use call light. See Russ Herron fall risk assessment.

## 2021-06-11 NOTE — PROGRESS NOTES
Patient agitated, confused and combative towards staff at this time. Dr. Yanira Castillo rounding and states to give IM geodon. Geodon given with assistance of PCT and [facility] R.N. Son outside of room and updated.

## 2021-06-11 NOTE — PROGRESS NOTES
[facility] ([facility]) Neurology Specialist  [address], [address] [facility]:  [phone] or [phone]  FAX:  [phone]            Brief history: Manan Hassan is a 68 y.o. old female admitted on 6/6/2021 with encephalopathy     Subjective: The patient continues to be very restless and agitated, she is kicking the nursing staff. She does not want to be examined. Objective: BP (!) 178/95   Pulse 100   Temp 97.9 °F (36.6 °C) (Axillary)   Resp 18   Ht 5' (1.524 m)   Wt 90 lb 4 oz (40.9 kg)   SpO2 96%   BMI 17.63 kg/m²       Medications:    amLODIPine  2.5 mg Oral Daily    sterile water  1.2 mL Intramuscular Once    Carbidopa-Levodopa ER  1 capsule Oral 5x Daily    Carbidopa-Levodopa ER  1 capsule Oral 5x Daily    escitalopram  20 mg Oral Daily    LORazepam  0.5 mg Oral TID    baclofen  10 mg Oral TID    traZODone  50 mg Oral Nightly    sodium chloride flush  5-40 mL Intravenous 2 times per day    enoxaparin  30 mg Subcutaneous Daily        Neurological examination:        No results found for: LDLCALC, LDLCHOLESTEROL, LDLDIRECT  No components found for: CHLPL  No results found for: TRIG  No results found for: HDL  No results found for: LDLCALC  No results found for: LABVLDL  No results found for: LABA1C  No results found for: EAG  No results found for: [id number]   Neurological work up:  CT head  CTA head and neck  MRI brain 10/24/2020 with sequela of chronic microvascular disease and volume loss. Dolichoectasia of posterior anterior circulation. 2 D echo     Assessment and Recommendations:     Patient with advanced Parkinson's disease, diagnosed in 1996 with significant ongoing phenomena and drug-induced dyskinesia. Patient has symptoms of dementia which likely secondary to underlying Parkinson's disease. Overnight events are reviewed. The patient was agitated restless, requiring Geodon 10 mg for muscular x1.   I will continue Geodon 10 mg intramuscular twice daily

## 2021-06-11 NOTE — PROGRESS NOTES
Alerted to patient room by [email or web address]. Patient agitated, pacing room with walker,stating that  and staff \"are trying to kill me\", and physically abusive towards staff. Verbal redirection unsuccessful. Patient refusing to take PRN ativan. Dr. Gilberto Horne notified via SendMe and arrived at bedside. Writer to contact neurology per Dr. Gilberto Horne. Dr. Yara Ware notified via SendMe. New orders for IM geodon and psych consult. Patient sitting in chair, geodon 10mg administered at 2045. Patient transferred back to bed x2 assist at 2100. Head to toe assessment completed. Family at bedside, all questions answered. Will provide support and education as needed.

## 2021-06-11 NOTE — CARE COORDINATION
Discharge planning    Family meeting with social workers, daughter and spouse. Writer did join. Discussion regarding plan of care. SS having difficulty with placement due to behavioral. Did discuss with Dr Mckenna Moreland if patient has any underlying dementia related to parkinsons and he will assess and document if appropriate. Discussion per family regarding whether patient will will even be eligible for her surgery. . patient family gave Amandeep Wren NP at [facility] under neurosurgery phone number. Call to Collette Carmin NP at [phone] and updated on the admission. Will fax over the charting, labs and radiology to her at [phone] once neurology places notes in epic.

## 2021-06-11 NOTE — PROGRESS NOTES
Subjective: Follow-up Parkinson's  And getting.   Vegetation confusion currently sleeping after Geodon  ROS  No fever no chills no GI/ complaints per nursing no cardiopulmonary complaints no TIA no bleeding no headache no sore throat no skin  physical exam  Head normocephalic atraumatic  Eyes no pallor no jaundice  Lungs clear to auscultation  Heart regular rate and rhythm  Abdomen soft positive bowel sounds  Extremities good pulses no edema negative Homans' sign  Patient alert oriented x3 with pain at vegetation confusion positive tremors and rigidity  BP (!) 178/95   Pulse 100   Temp 97.9 °F (36.6 °C) (Axillary)   Resp 18   Ht 5' (1.524 m)   Wt 90 lb 4 oz (40.9 kg)   SpO2 96%   BMI 17.63 kg/m²     CBC:   Lab Results   Component Value Date    WBC 10.7 06/11/2021    RBC 3.67 06/11/2021    HGB 11.1 06/11/2021    HCT 37.1 06/11/2021    .1 06/11/2021    MCH 30.2 06/11/2021    MCHC 29.9 06/11/2021    RDW 14.0 06/11/2021     06/11/2021    MPV 9.4 06/11/2021     CMP:    Lab Results   Component Value Date     06/11/2021    K 3.5 06/11/2021     06/11/2021    CO2 26 06/11/2021    BUN 17 06/11/2021    CREATININE 0.43 06/11/2021    GFRAA >60 06/11/2021    LABGLOM >60 06/11/2021    GLUCOSE 92 06/11/2021    PROT 6.4 06/07/2021    LABALBU 3.8 06/07/2021    CALCIUM 9.1 06/11/2021    BILITOT 0.35 06/07/2021    ALKPHOS 79 06/07/2021    AST 10 06/07/2021    ALT <5 06/07/2021        Assessment:  Patient Active Problem List   Diagnosis    [facility]'s [facility] ([facility] [address]    Spondylosis of lumbar region without myelopathy or radiculopathy    Acute cystitis    Parkinson's disease (tremor, stiffness, slow motion, unstable posture) ([facility] [address]     Parkinson's disease  Parkinson's disease with encephalopathy  Hyponatremia much better plan:    Meds labs reviewed appreciate neuro and psych may need to be admitted to the psych unit if not getting any medically stable for discharge awaiting rehab placement    Angelia Mclean Lauren Baez MD MD  6:25 PM

## 2021-06-11 NOTE — PROGRESS NOTES
Physical Therapy  DATE: 2021    NAME: Laurie Ramirez  MRN: [id number]   : 1945    Patient not seen this date for Physical Therapy due to:  [] Blood transfusion in progress  [x] Cancel by RN Hold until possibly later  [] Hemodialysis  []  Refusal by Patient   [] Spine Precautions   [] Strict Bedrest  [] Surgery  [] Testing      [] Other        [] PT being discontinued at this time. Patient independent. No further needs. [] PT being discontinued at this time as the patient has been transferred to hospice care. No further needs.     Jhon Barbosa, PTA

## 2021-06-11 NOTE — CARE COORDINATION
Social work:  As are result of overnight behaviors, Farzaneh Looney is denying at this time. Await response from [facility]. VM also left for [facility]. Spoke to [facility] Alcantara- concerned with pt's upcoming surgery and billing/transportation costs related to it. Referral to [facility] for review as they have a dementia/behavioral unit.

## 2021-06-11 NOTE — CARE COORDINATION
Social work: SNF placement continues to be an issue, currently the following facilities are following, but none have accepted at this point: [facility], [facility], [facility], [facility], [facility], [facility] and [facility]. Writer also faxed referral to [address] for review, however, they are currently working off a wait list, may have a bed next week. All want to follow patient over the weekend to see if medication changes today effect current behavioral concerns. Called family to update.

## 2021-06-11 NOTE — PROGRESS NOTES
Dr. Miramontes Mail called nurses station and states he spoke with psych doctor via telephone. See orders to start seroquel and zyprexa, dc geodon and decrease baclofen.

## 2021-06-12 LAB
ABSOLUTE EOS #: 0.07 K/UL (ref 0–0.44)
ABSOLUTE IMMATURE GRANULOCYTE: 0.08 K/UL (ref 0–0.3)
ABSOLUTE LYMPH #: 0.81 K/UL (ref 1.1–3.7)
ABSOLUTE MONO #: 0.87 K/UL (ref 0.1–1.2)
ANION GAP SERPL CALCULATED.3IONS-SCNC: 13 MMOL/L (ref 9–17)
BASOPHILS # BLD: 1 % (ref 0–2)
BASOPHILS ABSOLUTE: 0.07 K/UL (ref 0–0.2)
BUN BLDV-MCNC: 15 MG/DL (ref 8–23)
BUN/CREAT BLD: 36 (ref 9–20)
CALCIUM SERPL-MCNC: 9.5 MG/DL (ref 8.6–10.4)
CHLORIDE BLD-SCNC: 105 MMOL/L (ref 98–107)
CO2: 24 MMOL/L (ref 20–31)
CREAT SERPL-MCNC: 0.42 MG/DL (ref 0.5–0.9)
DIFFERENTIAL TYPE: ABNORMAL
EOSINOPHILS RELATIVE PERCENT: 1 % (ref 1–4)
GFR AFRICAN AMERICAN: >60 ML/MIN
GFR NON-AFRICAN AMERICAN: >60 ML/MIN
GFR SERPL CREATININE-BSD FRML MDRD: ABNORMAL ML/MIN/{1.73_M2}
GFR SERPL CREATININE-BSD FRML MDRD: ABNORMAL ML/MIN/{1.73_M2}
GLUCOSE BLD-MCNC: 103 MG/DL (ref 70–99)
HCT VFR BLD CALC: 40.3 % (ref 36.3–47.1)
HEMOGLOBIN: 12.4 G/DL (ref 11.9–15.1)
IMMATURE GRANULOCYTES: 1 %
LYMPHOCYTES # BLD: 6 % (ref 24–43)
MCH RBC QN AUTO: 30.8 PG (ref 25.2–33.5)
MCHC RBC AUTO-ENTMCNC: 30.8 G/DL (ref 28.4–34.8)
MCV RBC AUTO: 100.2 FL (ref 82.6–102.9)
MONOCYTES # BLD: 7 % (ref 3–12)
NRBC AUTOMATED: 0 PER 100 WBC
PDW BLD-RTO: 14.1 % (ref 11.8–14.4)
PLATELET # BLD: 358 K/UL (ref 138–453)
PLATELET ESTIMATE: ABNORMAL
PMV BLD AUTO: 9.5 FL (ref 8.1–13.5)
POTASSIUM SERPL-SCNC: 3.7 MMOL/L (ref 3.7–5.3)
RBC # BLD: 4.02 M/UL (ref 3.95–5.11)
RBC # BLD: ABNORMAL 10*6/UL
SARS-COV-2, RAPID: NOT DETECTED
SEG NEUTROPHILS: 84 % (ref 36–65)
SEGMENTED NEUTROPHILS ABSOLUTE COUNT: 11.11 K/UL (ref 1.5–8.1)
SODIUM BLD-SCNC: 142 MMOL/L (ref 135–144)
SPECIMEN DESCRIPTION: NORMAL
WBC # BLD: 13 K/UL (ref 3.5–11.3)
WBC # BLD: ABNORMAL 10*3/UL

## 2021-06-12 PROCEDURE — 99232 SBSQ HOSP IP/OBS MODERATE 35: CPT | Performed by: PSYCHIATRY & NEUROLOGY

## 2021-06-12 PROCEDURE — 2060000000 HC ICU INTERMEDIATE R&B

## 2021-06-12 PROCEDURE — 36415 COLL VENOUS BLD VENIPUNCTURE: CPT

## 2021-06-12 PROCEDURE — 87635 SARS-COV-2 COVID-19 AMP PRB: CPT

## 2021-06-12 PROCEDURE — 6370000000 HC RX 637 (ALT 250 FOR IP): Performed by: PSYCHIATRY & NEUROLOGY

## 2021-06-12 PROCEDURE — 6360000002 HC RX W HCPCS: Performed by: INTERNAL MEDICINE

## 2021-06-12 PROCEDURE — 90792 PSYCH DIAG EVAL W/MED SRVCS: CPT | Performed by: PSYCHIATRY & NEUROLOGY

## 2021-06-12 PROCEDURE — 85025 COMPLETE CBC W/AUTO DIFF WBC: CPT

## 2021-06-12 PROCEDURE — 2580000003 HC RX 258: Performed by: INTERNAL MEDICINE

## 2021-06-12 PROCEDURE — 80048 BASIC METABOLIC PNL TOTAL CA: CPT

## 2021-06-12 PROCEDURE — 6370000000 HC RX 637 (ALT 250 FOR IP): Performed by: INTERNAL MEDICINE

## 2021-06-12 RX ORDER — ZIPRASIDONE MESYLATE 20 MG/ML
10 INJECTION, POWDER, LYOPHILIZED, FOR SOLUTION INTRAMUSCULAR EVERY 12 HOURS PRN
Status: DISCONTINUED | OUTPATIENT
Start: 2021-06-12 | End: 2021-06-16 | Stop reason: HOSPADM

## 2021-06-12 RX ADMIN — QUETIAPINE FUMARATE 25 MG: 25 TABLET ORAL at 19:30

## 2021-06-12 RX ADMIN — BACLOFEN 5 MG: 10 TABLET ORAL at 19:30

## 2021-06-12 RX ADMIN — LORAZEPAM 0.5 MG: 0.5 TABLET ORAL at 09:04

## 2021-06-12 RX ADMIN — SODIUM CHLORIDE, PRESERVATIVE FREE 10 ML: 5 INJECTION INTRAVENOUS at 09:11

## 2021-06-12 RX ADMIN — LORAZEPAM 0.5 MG: 0.5 TABLET ORAL at 11:51

## 2021-06-12 RX ADMIN — BACLOFEN 5 MG: 10 TABLET ORAL at 09:04

## 2021-06-12 RX ADMIN — ENOXAPARIN SODIUM 30 MG: 30 INJECTION SUBCUTANEOUS at 09:05

## 2021-06-12 RX ADMIN — TRAZODONE HYDROCHLORIDE 50 MG: 50 TABLET ORAL at 19:30

## 2021-06-12 RX ADMIN — AMLODIPINE BESYLATE 2.5 MG: 2.5 TABLET ORAL at 09:04

## 2021-06-12 RX ADMIN — ESCITALOPRAM OXALATE 20 MG: 10 TABLET ORAL at 09:04

## 2021-06-12 RX ADMIN — SODIUM CHLORIDE, PRESERVATIVE FREE 10 ML: 5 INJECTION INTRAVENOUS at 19:30

## 2021-06-12 RX ADMIN — LORAZEPAM 0.5 MG: 0.5 TABLET ORAL at 18:06

## 2021-06-12 ASSESSMENT — PAIN SCALES - GENERAL
PAINLEVEL_OUTOF10: 0
PAINLEVEL_OUTOF10: 0

## 2021-06-12 NOTE — PROGRESS NOTES
Patient aggitated and combative towards staff. Patient received a dose of PO ativan 0.5 mg at 2030 which has been ineffective. Verbal redirection unsuccessful. Dr. Shaista Poon notified via [employer]. New orders for IM geodon. See MAR for administration details. Writer stayed with patient until geodon was effective. Will provide support and education as needed.

## 2021-06-12 NOTE — PROGRESS NOTES
[facility]) Neurology Specialist  [address]  [facility], [address] [facility]:  [phone] or [phone]  FAX:  [phone]            Brief history: Manan Hassan is a 68 y.o. old female admitted on 6/6/2021 with encephalopathy     Subjective: Overnight events are reviewed. The patient was intermittently agitated. She tends to respond to Geodon 10 mg intramuscular. This morning again she was agitated and restless. Objective: BP (!) 142/80   Pulse 115   Temp 98.2 °F (36.8 °C) (Axillary)   Resp 20   Ht 5' (1.524 m)   Wt 89 lb 9 oz (40.6 kg)   SpO2 96%   BMI 17.49 kg/m²       Medications:    baclofen  5 mg Oral BID    QUEtiapine  25 mg Oral Nightly    amLODIPine  2.5 mg Oral Daily    Carbidopa-Levodopa ER  1 capsule Oral 5x Daily    Carbidopa-Levodopa ER  1 capsule Oral 5x Daily    escitalopram  20 mg Oral Daily    LORazepam  0.5 mg Oral TID    traZODone  50 mg Oral Nightly    sodium chloride flush  5-40 mL Intravenous 2 times per day    enoxaparin  30 mg Subcutaneous Daily        Neurological examination:        No results found for: LDLCALC, LDLCHOLESTEROL, LDLDIRECT  No components found for: CHLPL  No results found for: TRIG  No results found for: HDL  No results found for: LDLCALC  No results found for: LABVLDL  No results found for: LABA1C  No results found for: EAG  No results found for: [id number]   Neurological work up:  CT head  CTA head and neck  MRI brain 10/24/2020 with sequela of chronic microvascular disease and volume loss. Dolichoectasia of posterior anterior circulation. 2 D echo     Assessment and Recommendations:     Patient with advanced Parkinson's disease, diagnosed in 1996 with significant ongoing phenomena and drug-induced dyskinesia. Patient has symptoms of dementia which likely secondary to underlying Parkinson's disease. The patient continues to have waxing and waning agitation and restlessness. She tends to respond to Geodon.   I would continue Geodon 10 mg intramuscular twice daily as needed, continue Seroquel 25 mg p.o. nightly scheduled. Await formal psych consult. Patient likely would need a placement to  [facility] psychiatric unit. Okay to discharge from neurological standpoint    We will follow while she is here. Dina Barraza MD  Neurology    This note is created with the assistance of a speech-recognition program. While intending to generate a document that actually reflects the content of the visit, the document can still have some errors including those of syntax and sound a- like substitutions which may escape proofreading. In such instances, actual meaning can be extrapolated by contextual derivation.

## 2021-06-12 NOTE — VIRTUAL HEALTH
Inpatient consult to Psychiatry  Consult performed by: Kenny Alarcon MD  Consult ordered by: Yumi Boyd MD  Reason for consult: Agitation  Assessment/Recommendations: Department of Psychiatry  Consult Service   Psychiatric Assessment      Thank you very much for allowing us to participate in the care of this patient. Reason for Consult: Agitation    HISTORY OF PRESENT ILLNESS:    Patient is a 60-year-old   female with extensive history of Parkinson's, started in year , with cognitive decline admitted for worsening dyskinesia and confusion. Patient is not fully oriented to time place person or situation. She is not oriented to date or month however mentions that we are going into summer. She initially identified the year as  and corrected herself to . Identifies place as \" home\". Patient reports that she is not doing well. Reports that she is \"being buried today\". Mentions that the  home people stated that she will be worried and she feels anxious about that. Unable to give me any clear history regarding her current situation. I reviewed her medications and records extensively. Also discussed with the neurologist- Dr Juana Trinidad about her care yesterday. Per report from staff patient has been very agitated requiring emergency medications. She has been kicking and hitting staff members. Per report poor sleep and appetite. PSYCHIATRIC HISTORY:  Patient is unable to provide any previous psychiatric history. Lifetime Psychiatric Review of Systems         Obsessions and Compulsions: Denies       Bernadette or Hypomania: Denies     Hallucinations: Denies     Panic Attacks:  Denies     Delusions:  Denies     Phobias:  Denies     Trauma: Denies    Prior to Admission medications   Medication Sig Start Date End Date Taking?  Authorizing Provider  oxyCODONE-acetaminophen (PERCOCET) 5-325 MG per tablet Take 1 tablet by mouth every 6 hours as needed for Pain for up to 7 days. 6/10/21 6/17/21 Yes Nasima Burks MD  LORazepam (ATIVAN) 0.5 MG tablet Take 1 tablet by mouth three times daily for 7 days. 6a, 12p, 6p 6/10/21 6/17/21 Yes Nasima Burks MD  Carbidopa-Levodopa ER 48. MG CPCR Take 1 capsule by mouth 5 times daily 6/10/21  Yes Nasima Burks MD  Carbidopa-Levodopa ER (RYTARY) 36. MG CPCR Take 1 capsule by mouth 2 times daily as needed (for parkinsons symptoms) 6/10/21  Yes Nasima Burks MD  amLODIPine (NORVASC) 2.5 MG tablet Take 1 tablet by mouth daily 6/10/21  Yes Nasima Burks MD  Simethicone (GAS-X PO) Take 1 tablet by mouth 2 times daily as needed   Yes Historical Provider, MD  NONFORMULARY Take 2 tablets by mouth daily Adult gummie calcium 500 mg each, two each morning   Yes Historical Provider, MD  Psyllium (DAILY FIBER PO) Take 2 capsules by mouth daily   Yes Historical Provider, MD  albuterol sulfate HFA (PROVENTIL HFA) 108 (90 Base) MCG/ACT inhaler Inhale 2 puffs into the lungs every 4 hours as needed for Wheezing or Shortness of Breath (Space out to every 6 hours as symptoms improve) Space out to every 6 hours as symptoms improve.  5/29/21   Benito Leon MD  Cholecalciferol (VITAMIN D3 PO) Take 4,000 Units by mouth daily     Historical Provider, MD  Magnesium 400 MG CAPS Take 2 capsules by mouth daily     Historical Provider, MD  baclofen (LIORESAL) 10 MG tablet Take 10 mg by mouth 3 times daily as needed     Historical Provider, MD  traZODone (DESYREL) 50 MG tablet Take 1 tablet by mouth nightly  6/12/17   Historical Provider, MD  escitalopram (LEXAPRO) 20 MG tablet Take 20 mg by mouth daily  6/26/17   Historical Provider, MD  Multiple Vitamins-Calcium (ONE-A-DAY WOMENS PO) Take 1 tablet by mouth daily    Historical Provider, MD       Medications:    Current Facility-Administered Medications: LORazepam (ATIVAN) injection 0.5 mg, 0.5 mg, Intravenous, Q12H PRN  baclofen (LIORESAL) tablet 5 mg, 5 mg, Oral, BID  QUEtiapine (SEROQUEL) tablet 25 mg, 25 mg, Oral, Nightly  hydrALAZINE (APRESOLINE) injection 5 mg, 5 mg, Intravenous, Q6H PRN  LORazepam (ATIVAN) tablet 0.5 mg, 0.5 mg, Oral, Q12H PRN  amLODIPine (NORVASC) tablet 2.5 mg, 2.5 mg, Oral, Daily  oxyCODONE-acetaminophen (PERCOCET) 5-325 MG per tablet 1 tablet, 1 tablet, Oral, Q6H PRN  Carbidopa-Levodopa ER 48. MG CPCR 1 capsule, 1 capsule, Oral, 5x Daily  Carbidopa-Levodopa ER 36. MG CPCR 1 capsule, 1 capsule, Oral, 5x Daily  escitalopram (LEXAPRO) tablet 20 mg, 20 mg, Oral, Daily  LORazepam (ATIVAN) tablet 0.5 mg, 0.5 mg, Oral, TID  Carbidopa-Levodopa ER 36. MG CPCR 1 capsule, 1 capsule, Oral, BID PRN  Carbidopa-Levodopa ER 48. MG CPCR 1 capsule, 1 capsule, Oral, BID PRN  albuterol (PROVENTIL) nebulizer solution 2.5 mg, 2.5 mg, Nebulization, Q2H PRN  albuterol sulfate  (90 Base) MCG/ACT inhaler 2 puff, 2 puff, Inhalation, Q4H PRN  traZODone (DESYREL) tablet 50 mg, 50 mg, Oral, Nightly  sodium chloride flush 0.9 % injection 5-40 mL, 5-40 mL, Intravenous, 2 times per day  sodium chloride flush 0.9 % injection 5-40 mL, 5-40 mL, Intravenous, PRN  0.9 % sodium chloride infusion, 25 mL, Intravenous, PRN  enoxaparin (LOVENOX) injection 30 mg, 30 mg, Subcutaneous, Daily  ondansetron (ZOFRAN-ODT) disintegrating tablet 4 mg, 4 mg, Oral, Q8H PRN **OR** ondansetron (ZOFRAN) injection 4 mg, 4 mg, Intravenous, Q6H PRN  polyethylene glycol (GLYCOLAX) packet 17 g, 17 g, Oral, Daily PRN  acetaminophen (TYLENOL) tablet 650 mg, 650 mg, Oral, Q6H PRN **OR** acetaminophen (TYLENOL) suppository 650 mg, 650 mg, Rectal, Q6H PRN     Past Medical History:       Diagnosis Date   Cancer (HCC)    breast   Degenerative joint disease (DJD) of lumbar spine    Hearing loss    Hypertension    Osteoporosis, senile    [facility]    Parkinson's disease ([facility]    Tremor       Past Surgical History:       Procedure Laterality Date   BREAST LUMPECTOMY Right    COLONOSCOPY     MASTECTOMY Right 08/23/2016   simple on 8/23/2016 - and again 10/13/2016   OTHER SURGICAL HISTORY     Radio frequency lesioning for low back pain   OTHER SURGICAL HISTORY  12/13/2016   growth removed from left side of neck   SIGMOIDOSCOPY     TONSILLECTOMY        Allergies: Patient has no known allergies. Medical ROS:  Constitutional--Negative for fevers, chills, fatigue  Cardiovascular--Negative for orthopnea, PND, Negative for lower extremity edema    Social History:    Patient reports she is born and raised in [place]. Reports currently she lives with her . Mentions that they have been  for 52 years. Reports that she has 2 children. Reports working as a  before retiring. SUBSTANCE USE HISTORY: Denies any significant substance use history. Family Medical and Psychiatric History:   Denies any significant psychiatric history in family.      Problem Relation Age of Onset   Stroke Mother    Heart Disease Mother    Heart Attack Father    Heart Disease Father    High Blood Pressure Father    High Blood Pressure Brother       Physical  BP (!) 178/81   Pulse 94   Temp 98.2 °F (36.8 °C) (Oral)   Resp 16   Ht 5' (1.524 m)   Wt 89 lb 9 oz (40.6 kg)   SpO2 96%   BMI 17.49 kg/m²     Mental Status Examination:  Level of consciousness:  Within normal limits  Appearance: hospital attire, lying in bed, fair grooming  Behavior/Motor: Psychomotor retardation  Attitude toward examiner:  cooperative, attentive and good eye contact  Speech: Soft  Mood: Anxious  Affect: mood congruent  Thought processes: Illogical associations  Thought content: Paranoid delusions  Cognition:  Oriented to self, and year but not to location or situation  Concentration poor  Memory poor  Insight & Judgment: Limited    DSM-5 DIAGNOSIS:    Parkinson's dementia with behavioral disturbances    Stressors     Severity of stressors is moderate  Source of stressors include: Other psychosocial and environmental stressors    PLAN:    Concerns for polypharmacy as her Ativan and Percocets have been steadily increased in the last several months. Verified [place]ARRS. This might be worsening her confusion and also leading to falls. Discussed plan with Dr. Kaden Bal to start Seroquel 25 mg scheduled at nighttime along with having Zyprexa 5 mg twice daily as needed. Pharmacy mentioned that they are not carrying Zyprexa. Can use Geodon 10 mg twice daily as needed for severe breakthrough agitation. Would prefer to wean her off the Ativan and Percocets however patient needs to be in a more controlled set up for that. Recommend admitting to [facility] psych if patient family is agreeable to the plan. We will continue to follow as needed. Thank you very much for allowing us to participate in the care of this patient. .      Electronically signed by Karla Kong MD on 6/12/21 at 10:19 AM EDT            Patient Location:  [address] [place] [address] Care    Provider Location (City/State):   [address] [facility]    This virtual visit was conducted via interactive/real-time audio/video.

## 2021-06-12 NOTE — PROGRESS NOTES
Subjective: Follow-up Parkinson's  Patient at times not taking her medications she still.   Some agitation and hallucinations and paranoia  ROS  No fever no chills no GI/ complaints no cardiopulmonary complaints no TIA no bleeding no headache no sore throat no skin lesion no polyuria no polys  physical exam  General Appearance: in no acute distress  Skin: warm and dry, no rash or erythema  Head: normocephalic and atraumatic  Eyes: conjunctivae normal  Neck: neck supple and non tender without mass   Pulmonary/Chest: clear to auscultation bilaterally- no wheezes, rales or rhonchi, normal air movement, no respiratory distress  Cardiovascular: normal rate, regular rhythm, normal S1 and S2, no gallops, intact distal pulses and no carotid bruits  Abdomen: soft, non-tender, non-distended, normal bowel sounds, no masses or organomegaly  Extremities: no edema and good pulses no Homans' sign    Neurologic: Alert oriented x2 no focal deficit positive tremors positive rigidity    BP (!) 142/80   Pulse 115   Temp 98.2 °F (36.8 °C) (Axillary)   Resp 20   Ht 5' (1.524 m)   Wt 89 lb 9 oz (40.6 kg)   SpO2 96%   BMI 17.49 kg/m²     CBC:   Lab Results   Component Value Date    WBC 13.0 06/12/2021    RBC 4.02 06/12/2021    HGB 12.4 06/12/2021    HCT 40.3 06/12/2021    .2 06/12/2021    MCH 30.8 06/12/2021    MCHC 30.8 06/12/2021    RDW 14.1 06/12/2021     06/12/2021    MPV 9.5 06/12/2021     BMP:    Lab Results   Component Value Date     06/12/2021    K 3.7 06/12/2021     06/12/2021    CO2 24 06/12/2021    BUN 15 06/12/2021    LABALBU 3.8 06/07/2021    CREATININE 0.42 06/12/2021    CALCIUM 9.5 06/12/2021    GFRAA >60 06/12/2021    LABGLOM >60 06/12/2021    GLUCOSE 103 06/12/2021        Assessment:  Patient Active Problem List   Diagnosis    [facility]'s [facility] [address]    Spondylosis of lumbar region without myelopathy or radiculopathy    Acute cystitis    Parkinson's disease (tremor, stiffness, slow motion, unstable posture) (HCC)     Parkinson's with encephalopathy and psychosis  Hyper natremia mild to resolved  Hypertension continue with oral Norvasc and as needed Apresoline  Plan:    Meds labs reviewed continue the EPC cuffs patient has seen neurology and psychiatry recommendation is for [facility] psych when bed is available, she is medically stable for transfer      Grant Mejia MD MD  12:59 PM

## 2021-06-12 NOTE — PROGRESS NOTES
Physician Progress Note      PATIENT:               Hiram Pineda  CSN #:                  [id number]  :                       1945  ADMIT DATE:       2021 1:05 PM  [address] [place] DATE:  RESPONDING  PROVIDER #:        Red Freire MD          QUERY TEXT:    Pt admitted with exacerbation of Parkinson's. Pt noted to have AMS. If   possible, please document in the progress notes and discharge summary if you   are evaluating and / or treating any of the following: The medical record reflects the following:  Risk Factors: advanced Parkinson's  Clinical Indicators: per neuro notes  admitted on 2021 with   encephalopathy.  nursing notes:  Patient severely aggressive, combative and   agitated. Patient pulled her IV out. Multiple attempts to get IV in but   patient is too agitated be still. Writer and three other staff members at   bedside to help patient remain calm. Patient screaming ,crying out that   various family members had been \"killed\". Treatment: Ativan . 5mg po TID, sitter , bed alarms  Options provided:  -- Encephalopathy due to advanced Parkinson's  -- Metabolic encephalopathy  -- Other - I will add my own diagnosis  -- Disagree - Not applicable / Not valid  -- Disagree - Clinically unable to determine / Unknown  -- Refer to Clinical Documentation Reviewer    PROVIDER RESPONSE TEXT:    This patient has encephalopathy due to advanced Parkinson's. Query created by: Connie Arreguin on 2021 11:43 AM      QUERY TEXT:    Patient admitted with Parkinson's exacerbation. Noted documentation of   hyponatremia in PN . In order to support the diagnosis of hyponatremia,   please include additional clinical indicators in your documentation. Or   please document if the diagnosis of hyponatremia has been ruled out after   further study.     The medical record reflects the following:  Risk Factors: moderate malnutrition, dysphagia, decreased oral intake  Clinical Indicators: Serum NA  142, 6/7 146, 6/8 137, 6/9 140. Treatment: prn Zofran, daily lab monitoring. Options provided:  -- hyponatremia present as evidenced by, Please document evidence.   -- hyponatremia was ruled out  -- Other - I will add my own diagnosis  -- Disagree - Not applicable / Not valid  -- Disagree - Clinically unable to determine / Unknown  -- Refer to Clinical Documentation Reviewer    PROVIDER RESPONSE TEXT:    Patient had hyper not hyponatremia sodium was 146 went down to normalPatient    Query created by: Wilder Thompson on 6/9/2021 5:21 PM      Electronically signed by:  Prudence Augustin MD 6/12/2021 1:02 PM

## 2021-06-12 NOTE — PLAN OF CARE
Problem: Falls - Risk of:  Goal: Will remain free from falls  Description: Will remain free from falls  Outcome: Ongoing     Patient has remained free from falls this shift. Patient is alert and oriented times three. Bed to lowest position with door open. Patient care items and call light in reach. Patient does not use call light appropriately for assist. Will continue to monitor. Please see fall assessment.

## 2021-06-12 NOTE — PLAN OF CARE
Problem: Falls - Risk of:  Goal: Will remain free from falls  Description: Will remain free from falls  6/12/2021 0008 by Rl Barros RN  Outcome: Ongoing  Note: Fall risk assessment completed. Patient instructed to use call light. Bed locked and in lowest position, side rails up 2/4, call light and bedside table within reach, clutter removed, and non-skid footwear on when pt out of bed. Hourly rounds will continue.       Problem: Falls - Risk of:  Goal: Absence of physical injury  Description: Absence of physical injury  6/12/2021 0008 by Rl Barros RN  Outcome: Ongoing     Problem: Safety:  Goal: Ability to remain free from injury will improve  Description: Ability to remain free from injury will improve  6/12/2021 0008 by Rl Barros RN  Outcome: Ongoing

## 2021-06-13 ENCOUNTER — APPOINTMENT (OUTPATIENT)
Dept: GENERAL RADIOLOGY | Age: 76
DRG: 057 | End: 2021-06-13
Payer: MEDICARE

## 2021-06-13 LAB
-: ABNORMAL
ABSOLUTE EOS #: 0.12 K/UL (ref 0–0.44)
ABSOLUTE IMMATURE GRANULOCYTE: 0.07 K/UL (ref 0–0.3)
ABSOLUTE LYMPH #: 1.08 K/UL (ref 1.1–3.7)
ABSOLUTE MONO #: 0.86 K/UL (ref 0.1–1.2)
AMORPHOUS: ABNORMAL
AMPHETAMINE SCREEN URINE: NEGATIVE
BACTERIA: ABNORMAL
BARBITURATE SCREEN URINE: NEGATIVE
BASOPHILS # BLD: 1 % (ref 0–2)
BASOPHILS ABSOLUTE: 0.07 K/UL (ref 0–0.2)
BENZODIAZEPINE SCREEN, URINE: NEGATIVE
BILIRUBIN URINE: NEGATIVE
BUPRENORPHINE URINE: NORMAL
CANNABINOID SCREEN URINE: NEGATIVE
CASTS UA: ABNORMAL /LPF
COCAINE METABOLITE, URINE: NEGATIVE
COLOR: YELLOW
COMMENT UA: ABNORMAL
CRYSTALS, UA: ABNORMAL /HPF
DIFFERENTIAL TYPE: ABNORMAL
EOSINOPHILS RELATIVE PERCENT: 1 % (ref 1–4)
EPITHELIAL CELLS UA: ABNORMAL /HPF (ref 0–5)
GLUCOSE URINE: NEGATIVE
HCT VFR BLD CALC: 36 % (ref 36.3–47.1)
HEMOGLOBIN: 10.8 G/DL (ref 11.9–15.1)
IMMATURE GRANULOCYTES: 1 %
KETONES, URINE: ABNORMAL
LEUKOCYTE ESTERASE, URINE: NEGATIVE
LYMPHOCYTES # BLD: 9 % (ref 24–43)
MCH RBC QN AUTO: 30.1 PG (ref 25.2–33.5)
MCHC RBC AUTO-ENTMCNC: 30 G/DL (ref 28.4–34.8)
MCV RBC AUTO: 100.3 FL (ref 82.6–102.9)
MDMA URINE: NORMAL
METHADONE SCREEN, URINE: NEGATIVE
METHAMPHETAMINE, URINE: NORMAL
MONOCYTES # BLD: 7 % (ref 3–12)
MUCUS: ABNORMAL
MYOGLOBIN: 57 NG/ML (ref 25–58)
MYOGLOBIN: 60 NG/ML (ref 25–58)
NITRITE, URINE: NEGATIVE
NRBC AUTOMATED: 0 PER 100 WBC
OPIATES, URINE: NEGATIVE
OTHER OBSERVATIONS UA: ABNORMAL
OXYCODONE SCREEN URINE: NEGATIVE
PDW BLD-RTO: 14 % (ref 11.8–14.4)
PH UA: 6 (ref 5–8)
PHENCYCLIDINE, URINE: NEGATIVE
PLATELET # BLD: 332 K/UL (ref 138–453)
PLATELET ESTIMATE: ABNORMAL
PMV BLD AUTO: 9.1 FL (ref 8.1–13.5)
PROPOXYPHENE, URINE: NORMAL
PROTEIN UA: ABNORMAL
RBC # BLD: 3.59 M/UL (ref 3.95–5.11)
RBC # BLD: ABNORMAL 10*6/UL
RBC UA: ABNORMAL /HPF (ref 0–2)
RENAL EPITHELIAL, UA: ABNORMAL /HPF
SEG NEUTROPHILS: 81 % (ref 36–65)
SEGMENTED NEUTROPHILS ABSOLUTE COUNT: 10.29 K/UL (ref 1.5–8.1)
SPECIFIC GRAVITY UA: 1.04 (ref 1–1.03)
TEST INFORMATION: NORMAL
TRICHOMONAS: ABNORMAL
TRICYCLIC ANTIDEPRESSANTS, UR: NORMAL
TROPONIN INTERP: ABNORMAL
TROPONIN INTERP: ABNORMAL
TROPONIN T: ABNORMAL NG/ML
TROPONIN T: ABNORMAL NG/ML
TROPONIN, HIGH SENSITIVITY: 25 NG/L (ref 0–14)
TROPONIN, HIGH SENSITIVITY: 29 NG/L (ref 0–14)
TURBIDITY: CLEAR
URINE HGB: NEGATIVE
UROBILINOGEN, URINE: NORMAL
WBC # BLD: 12.5 K/UL (ref 3.5–11.3)
WBC # BLD: ABNORMAL 10*3/UL
WBC UA: ABNORMAL /HPF (ref 0–5)
YEAST: ABNORMAL

## 2021-06-13 PROCEDURE — 6370000000 HC RX 637 (ALT 250 FOR IP): Performed by: INTERNAL MEDICINE

## 2021-06-13 PROCEDURE — 99233 SBSQ HOSP IP/OBS HIGH 50: CPT | Performed by: PSYCHIATRY & NEUROLOGY

## 2021-06-13 PROCEDURE — 6370000000 HC RX 637 (ALT 250 FOR IP): Performed by: PSYCHIATRY & NEUROLOGY

## 2021-06-13 PROCEDURE — 2060000000 HC ICU INTERMEDIATE R&B

## 2021-06-13 PROCEDURE — 84484 ASSAY OF TROPONIN QUANT: CPT

## 2021-06-13 PROCEDURE — 83874 ASSAY OF MYOGLOBIN: CPT

## 2021-06-13 PROCEDURE — 71045 X-RAY EXAM CHEST 1 VIEW: CPT

## 2021-06-13 PROCEDURE — 97530 THERAPEUTIC ACTIVITIES: CPT

## 2021-06-13 PROCEDURE — 80307 DRUG TEST PRSMV CHEM ANLYZR: CPT

## 2021-06-13 PROCEDURE — 81001 URINALYSIS AUTO W/SCOPE: CPT

## 2021-06-13 PROCEDURE — 85025 COMPLETE CBC W/AUTO DIFF WBC: CPT

## 2021-06-13 PROCEDURE — 6360000002 HC RX W HCPCS: Performed by: INTERNAL MEDICINE

## 2021-06-13 PROCEDURE — 36415 COLL VENOUS BLD VENIPUNCTURE: CPT

## 2021-06-13 PROCEDURE — 2580000003 HC RX 258: Performed by: INTERNAL MEDICINE

## 2021-06-13 RX ORDER — AMLODIPINE BESYLATE 5 MG/1
5 TABLET ORAL DAILY
Status: DISCONTINUED | OUTPATIENT
Start: 2021-06-14 | End: 2021-06-16 | Stop reason: HOSPADM

## 2021-06-13 RX ADMIN — BACLOFEN 5 MG: 10 TABLET ORAL at 21:06

## 2021-06-13 RX ADMIN — LORAZEPAM 0.5 MG: 0.5 TABLET ORAL at 12:24

## 2021-06-13 RX ADMIN — SODIUM CHLORIDE, PRESERVATIVE FREE 10 ML: 5 INJECTION INTRAVENOUS at 09:11

## 2021-06-13 RX ADMIN — BACLOFEN 5 MG: 10 TABLET ORAL at 09:10

## 2021-06-13 RX ADMIN — QUETIAPINE FUMARATE 25 MG: 25 TABLET ORAL at 21:06

## 2021-06-13 RX ADMIN — SODIUM CHLORIDE, PRESERVATIVE FREE 10 ML: 5 INJECTION INTRAVENOUS at 21:06

## 2021-06-13 RX ADMIN — AMLODIPINE BESYLATE 2.5 MG: 2.5 TABLET ORAL at 09:10

## 2021-06-13 RX ADMIN — TRAZODONE HYDROCHLORIDE 50 MG: 50 TABLET ORAL at 21:06

## 2021-06-13 RX ADMIN — LORAZEPAM 0.5 MG: 0.5 TABLET ORAL at 05:19

## 2021-06-13 RX ADMIN — LORAZEPAM 0.5 MG: 0.5 TABLET ORAL at 18:15

## 2021-06-13 RX ADMIN — ENOXAPARIN SODIUM 30 MG: 30 INJECTION SUBCUTANEOUS at 09:11

## 2021-06-13 RX ADMIN — ESCITALOPRAM OXALATE 20 MG: 10 TABLET ORAL at 09:10

## 2021-06-13 NOTE — PROGRESS NOTES
Unable to obtain EKG due to tremors and restlessness, Dr. Alize Heart and informed and said to try again this evening when pt is calm

## 2021-06-13 NOTE — PROGRESS NOTES
treatment then returns to agitated state. Patient Diagnosis(es): The primary encounter diagnosis was Parkinson's disease ([facility] [address] Diagnoses of Spondylosis of lumbar region without myelopathy or radiculopathy and Anxiety were also pertinent to this visit. has a past medical history of Cancer [facility]), Degenerative joint disease (DJD) of lumbar spine, Hearing loss, Hypertension, Osteoporosis, senile, Parkinson's disease ([facility] [address] Parkinson's disease ([facility] [address] and Tremor. has a past surgical history that includes Breast lumpectomy (Right); Tonsillectomy; Sigmoidoscopy; Colonoscopy; other surgical history; Mastectomy (Right, 08/23/2016); and other surgical history (12/13/2016). Restrictions  Restrictions/Precautions  Restrictions/Precautions: General Precautions, Fall Risk  Required Braces or Orthoses?: No  Position Activity Restriction  Other position/activity restrictions: up with assist, ALARMS, LUE IV  Subjective   General  Chart Reviewed: Yes  Additional Pertinent Hx: Ewiia[place]ayp, Parkinson's disease, HTN, tremor  Response To Previous Treatment: Patient with no complaints from previous session. Family / Caregiver Present: No  Subjective  Subjective: Pt agreeable to PT however becomes combative and then becomes appropriate for therapy again. General Comment  Comments: FLORENTIN Bowles PT  Pain Screening  Patient Currently in Pain: Denies  Vital Signs  Patient Currently in Pain: Denies       Orientation  Orientation  Overall Orientation Status: Impaired  Orientation Level: Disoriented to place; Disoriented to situation  Cognition      Objective   Bed mobility  Bridging: Moderate assistance;2 Person assistance  Rolling to Left: Moderate assistance;2 Person assistance  Rolling to Right: Moderate assistance;2 Person assistance  Supine to Sit: Unable to assess  Sit to Supine: Unable to assess  Scooting:  Moderate assistance;2 Person assistance  Comment: Patient up in bed upon arrival.  Patient presents with not gown or socks on and agitated upon talking with patient she becomes agreeable to Therapuetic techniques and more communicative of needs. Patient reaches for bed rails and requires min assist for momentum then becomes a mod x2 with agitation. Transfers  Sit to Stand: Unable to assess  Comment: Patient requies maximum vc's for redirection and task management. Patient assisted with brief change and repositioning to promote proper positioning for eating. Patient with increased tremors noted. Ambulation  Ambulation?: No  Neuromuscular Education  NDT Treatment: Lower extremity; Sitting    AM-PAC Score  AM-PAC Inpatient Mobility Raw Score : 7 (06/13/21 0943)  AM-PAC Inpatient T-Scale Score : 26.42 (06/13/21 0943)  Mobility Inpatient CMS 0-100% Score: 92.36 (06/13/21 0943)  Mobility Inpatient CMS G-Code Modifier : CM (06/13/21 7490)          Goals  Short term goals  Time Frame for Short term goals: 12 visits  Short term goal 1: Inc bed-mobility & transfers to independent to enable pt to safely get in/OOB & chair  Short term goal 2: Inc gait to amb 150ft or > indep w/ RW to enable pt to return to previous level of independence  Short term goal 3:  Inc strength to [address] standing balance to good with device to facilitate pt independence for performance of ADL's & functional mobility, & reduce fall risk  Short term goal 4: Pt able to tolerate 30-40 min of activity to include 15-20 reps of ex, NMR & functional mobility including 5 minutes of standing with device to facilitate activity tolerance to [facility]  Short term goal 5: Ed pt on home ex's, safety & energy principles, fall prevention, & issue written home program    Plan    Plan  Times per week: 1-2x/d, 5-6D/week  Current Treatment Recommendations: Strengthening, Balance Training, Functional Mobility Training, Transfer Training, Endurance Training, Gait Training, Cognitive Reorientation, Home Exercise Program, Safety Education & Training, Patient/Caregiver Education & Training  Safety Devices Type of devices: Call light within reach, Gait belt, Patient at risk for falls, Nurse notified, Left in bed, Bed alarm in place  Restraints  Initially in place: No     Therapy Time   Individual Concurrent Group Co-treatment   Time In 0901         Time Out 0927         Minutes [address] [place], [place]

## 2021-06-13 NOTE — PROGRESS NOTES
Writer spoke with Flora Latham regarding possible placement at [facility]. They would need a new EKG, tox screen, and medical clearance documents. Dr. Del Argueta notified. Will relay information to dayshift RN.

## 2021-06-13 NOTE — PROGRESS NOTES
Pt resting in bed without distress, pt still has periods of extreme tremors and agitation/restlessness but seems slightly improved since yesterday, will continue to monitor

## 2021-06-13 NOTE — PROGRESS NOTES
[facility]) Neurology Specialist  [address], [address] [facility]:  [phone] or [phone]  FAX:  [phone]            Brief history: Tc Hicks is a 68 y.o. old female admitted on 6/6/2021 with encephalopathy     Subjective: The patient continues to have waxing and waning mental status. This morning, she is more awake. Overnight, she was somewhat confused but had a restful sleep with Seroquel. Objective: BP (!) 174/104   Pulse 86   Temp 97.5 °F (36.4 °C) (Oral)   Resp 16   Ht 5' (1.524 m)   Wt 90 lb 2 oz (40.9 kg)   SpO2 96%   BMI 17.60 kg/m²       Medications:    [START ON 6/14/2021] amLODIPine  5 mg Oral Daily    baclofen  5 mg Oral BID    QUEtiapine  25 mg Oral Nightly    Carbidopa-Levodopa ER  1 capsule Oral 5x Daily    Carbidopa-Levodopa ER  1 capsule Oral 5x Daily    escitalopram  20 mg Oral Daily    LORazepam  0.5 mg Oral TID    traZODone  50 mg Oral Nightly    sodium chloride flush  5-40 mL Intravenous 2 times per day    enoxaparin  30 mg Subcutaneous Daily        Neurological examination:        No results found for: LDLCALC, LDLCHOLESTEROL, LDLDIRECT  No components found for: CHLPL  No results found for: TRIG  No results found for: HDL  No results found for: LDLCALC  No results found for: LABVLDL  No results found for: LABA1C  No results found for: EAG  No results found for: [id number]   Neurological work up:  CT head  CTA head and neck  MRI brain 10/24/2020 with sequela of chronic microvascular disease and volume loss. Dolichoectasia of posterior anterior circulation. 2 D echo     Assessment and Recommendations:     Patient with advanced Parkinson's disease, diagnosed in 1996 with significant ongoing phenomena and drug-induced dyskinesia. Patient has symptoms of dementia which likely secondary to underlying Parkinson's disease. Encephalopathy continues to wax and wane however somewhat improved from yesterday.   She did not require Geodon. She has been getting Seroquel 25 mg p.o. nightly. On outpatient basis, trazodone can be tapered off to prevent polypharmacy. Patient's baclofen also has been tapered down to 5 mg twice daily. This time, she does not report any pain symptoms. Currently she is on as needed Ativan, home dose. On outpatient basis, Ativan can also be gradually tapered off. EKG today is pending. If QTC is not prolonged, recommend continue Seroquel and continue her activity as scheduled for Parkinson's disease. She is currently on  Rytary 145 mg +195 mg 2 tablets 9 AM, noon, 3 PM, 6 PM and 9 PM    Okay to discharge to [facility] psychiatric unit from neurological standpoint    We will follow. Ly Amado MD  Neurology    This note is created with the assistance of a speech-recognition program. While intending to generate a document that actually reflects the content of the visit, the document can still have some errors including those of syntax and sound a- like substitutions which may escape proofreading. In such instances, actual meaning can be extrapolated by contextual derivation.

## 2021-06-13 NOTE — PROGRESS NOTES
[facility] called and said that [facility] at [facility] can not accept pt due to chest x-ray results and they make take another look at pt if a ct scan is ordered

## 2021-06-13 NOTE — CARE COORDINATION
Discharge planning    Call to net access and they are requesting an EKG and drug tox per UA. RN perfect serve DR Kate Olivia and order obtained. Spoke with [facility] and they were looking at [facility] general but she is not thinking may be able to get into [facility]. [facility] will call back once they discuss with Dr Clary Weiss from [facility] ( he did the telepsych visit on 6/12)     Writers call back number given to net access. Call back and patient not appropriate for ST. Net access will try flower along with [facility]. Paolo Deleon Updated Ebony Spencer    [phone]  Faxed both UA tox screen and EKG to [facility] [employer]     [facility] [employer]   Phone is [phone]  Fax [phone]   Call from [employer] requesting that writer send  UA TOX. EKG, any radiology results and COVID testing to [facility]. Also added covid testing, telepsych note along with attending notes to [facility] psych at  [facility] asking for CXR, UA and explanation of any elevated lab abnormalities. Ordered CXR and will have to discuss about UA as she is incontinent . May not be able to obtain specimen unless patient is straight cath which can be difficult due to parkinsons. RN following up with attending.

## 2021-06-13 NOTE — PROGRESS NOTES
Subjective: Follow-up Parkinson's  Doing about the same still with tremors and rigidity.   Some vegetation and confusion and hallucinations  ROS  No fever no chills no GI/ complaints no cardiopulmonary complaints no TIA no bleeding no headache no sore throat no skin lesions no polyuria no polydipsia  physical exam  General Appearance: in no acute distress and alert  Skin: warm and dry, no rash or erythema  Head: normocephalic and atraumatic  Eyes: pupils equal, round, and reactive to light, conjunctivae normal and sclera anicteric  Neck: neck supple and non tender without mass   Pulmonary/Chest: clear to auscultation bilaterally- no wheezes, rales or rhonchi, normal air movement, no respiratory distress  Cardiovascular: normal rate, regular rhythm, normal S1 and S2, no gallops, intact distal pulses and no carotid bruits  Abdomen: soft, non-tender, non-distended, normal bowel sounds, no masses or organomegaly  Extremities: no edema and good pulses no Homans' sign    Neurologic: Alert oriented x3 no focal deficit    BP (!) 174/104   Pulse 86   Temp 97.5 °F (36.4 °C) (Oral)   Resp 16   Ht 5' (1.524 m)   Wt 90 lb 2 oz (40.9 kg)   SpO2 96%   BMI 17.60 kg/m²     CBC:   Lab Results   Component Value Date    WBC 13.0 06/12/2021    RBC 4.02 06/12/2021    HGB 12.4 06/12/2021    HCT 40.3 06/12/2021    .2 06/12/2021    MCH 30.8 06/12/2021    MCHC 30.8 06/12/2021    RDW 14.1 06/12/2021     06/12/2021    MPV 9.5 06/12/2021     CMP:    Lab Results   Component Value Date     06/12/2021    K 3.7 06/12/2021     06/12/2021    CO2 24 06/12/2021    BUN 15 06/12/2021    CREATININE 0.42 06/12/2021    GFRAA >60 06/12/2021    LABGLOM >60 06/12/2021    GLUCOSE 103 06/12/2021    PROT 6.4 06/07/2021    LABALBU 3.8 06/07/2021    CALCIUM 9.5 06/12/2021    BILITOT 0.35 06/07/2021    ALKPHOS 79 06/07/2021    AST 10 06/07/2021    ALT <5 06/07/2021        Assessment:  Patient Active Problem List   Diagnosis   

## 2021-06-13 NOTE — PLAN OF CARE
Problem: Falls - Risk of:  Goal: Will remain free from falls  Description: Will remain free from falls  6/13/2021 0134 by Carli Bunn RN  Outcome: Ongoing  Note: Fall risk assessment completed. Patient instructed to use call light. Bed locked and in lowest position, side rails up 2/4, call light and bedside table within reach, clutter removed, and non-skid footwear on when pt out of bed. Hourly rounds will continue.       Problem: Falls - Risk of:  Goal: Absence of physical injury  Description: Absence of physical injury  Outcome: Ongoing     Problem: Skin Integrity:  Goal: Will show no infection signs and symptoms  Description: Will show no infection signs and symptoms  Outcome: Ongoing

## 2021-06-14 ENCOUNTER — APPOINTMENT (OUTPATIENT)
Dept: GENERAL RADIOLOGY | Age: 76
DRG: 057 | End: 2021-06-14
Payer: MEDICARE

## 2021-06-14 PROBLEM — E44.1 MILD MALNUTRITION (HCC): Status: ACTIVE | Noted: 2021-06-14

## 2021-06-14 LAB
MYOGLOBIN: 26 NG/ML (ref 25–58)
TROPONIN INTERP: ABNORMAL
TROPONIN T: ABNORMAL NG/ML
TROPONIN, HIGH SENSITIVITY: 21 NG/L (ref 0–14)

## 2021-06-14 PROCEDURE — 97530 THERAPEUTIC ACTIVITIES: CPT

## 2021-06-14 PROCEDURE — 6370000000 HC RX 637 (ALT 250 FOR IP): Performed by: PSYCHIATRY & NEUROLOGY

## 2021-06-14 PROCEDURE — 97535 SELF CARE MNGMENT TRAINING: CPT

## 2021-06-14 PROCEDURE — 93005 ELECTROCARDIOGRAM TRACING: CPT | Performed by: INTERNAL MEDICINE

## 2021-06-14 PROCEDURE — 83874 ASSAY OF MYOGLOBIN: CPT

## 2021-06-14 PROCEDURE — 6370000000 HC RX 637 (ALT 250 FOR IP): Performed by: INTERNAL MEDICINE

## 2021-06-14 PROCEDURE — 71045 X-RAY EXAM CHEST 1 VIEW: CPT

## 2021-06-14 PROCEDURE — 2580000003 HC RX 258: Performed by: INTERNAL MEDICINE

## 2021-06-14 PROCEDURE — 84484 ASSAY OF TROPONIN QUANT: CPT

## 2021-06-14 PROCEDURE — 6360000002 HC RX W HCPCS: Performed by: INTERNAL MEDICINE

## 2021-06-14 PROCEDURE — 36415 COLL VENOUS BLD VENIPUNCTURE: CPT

## 2021-06-14 PROCEDURE — 2060000000 HC ICU INTERMEDIATE R&B

## 2021-06-14 PROCEDURE — 97129 THER IVNTJ 1ST 15 MIN: CPT

## 2021-06-14 RX ORDER — SODIUM CHLORIDE 9 MG/ML
INJECTION, SOLUTION INTRAVENOUS CONTINUOUS
Status: ACTIVE | OUTPATIENT
Start: 2021-06-14 | End: 2021-06-15

## 2021-06-14 RX ADMIN — HYDRALAZINE HYDROCHLORIDE 5 MG: 20 INJECTION INTRAMUSCULAR; INTRAVENOUS at 04:46

## 2021-06-14 RX ADMIN — LORAZEPAM 0.5 MG: 0.5 TABLET ORAL at 12:43

## 2021-06-14 RX ADMIN — SODIUM CHLORIDE, PRESERVATIVE FREE 10 ML: 5 INJECTION INTRAVENOUS at 09:53

## 2021-06-14 RX ADMIN — QUETIAPINE FUMARATE 25 MG: 25 TABLET ORAL at 20:26

## 2021-06-14 RX ADMIN — SODIUM CHLORIDE, PRESERVATIVE FREE 10 ML: 5 INJECTION INTRAVENOUS at 20:26

## 2021-06-14 RX ADMIN — OXYCODONE AND ACETAMINOPHEN 1 TABLET: 5; 325 TABLET ORAL at 14:10

## 2021-06-14 RX ADMIN — BACLOFEN 5 MG: 10 TABLET ORAL at 09:52

## 2021-06-14 RX ADMIN — ENOXAPARIN SODIUM 30 MG: 30 INJECTION SUBCUTANEOUS at 09:52

## 2021-06-14 RX ADMIN — TRAZODONE HYDROCHLORIDE 50 MG: 50 TABLET ORAL at 20:26

## 2021-06-14 RX ADMIN — ESCITALOPRAM OXALATE 20 MG: 10 TABLET ORAL at 09:52

## 2021-06-14 RX ADMIN — BACLOFEN 5 MG: 10 TABLET ORAL at 20:26

## 2021-06-14 RX ADMIN — SODIUM CHLORIDE: 9 INJECTION, SOLUTION INTRAVENOUS at 20:27

## 2021-06-14 RX ADMIN — LORAZEPAM 0.5 MG: 0.5 TABLET ORAL at 17:27

## 2021-06-14 RX ADMIN — AMLODIPINE BESYLATE 5 MG: 5 TABLET ORAL at 09:52

## 2021-06-14 RX ADMIN — LORAZEPAM 0.5 MG: 0.5 TABLET ORAL at 04:46

## 2021-06-14 ASSESSMENT — PAIN SCALES - GENERAL
PAINLEVEL_OUTOF10: 9
PAINLEVEL_OUTOF10: 3
PAINLEVEL_OUTOF10: 0
PAINLEVEL_OUTOF10: 5

## 2021-06-14 NOTE — PROGRESS NOTES
Occupational Therapy  Facility/Department: [facility] PROGRESSIVE CARE  Daily Treatment Note  NAME: Jessica Marin  : 1945  MRN: [id number]    Date of Service: 2021    Discharge Recommendations:  Patient would benefit from continued therapy after discharge     Pt currently functioning below baseline. Would suggest additional therapy at time of discharge to maximize long term outcomes and prevent re-admission. Please refer to AM-PAC score for current level of function. Assessment   Performance deficits / Impairments: Decreased functional mobility ; Decreased ADL status; Decreased strength;Decreased safe awareness;Decreased cognition;Decreased endurance;Decreased posture;Decreased balance;Decreased coordination  Prognosis: Fair  OT Education: OT Role;Plan of Care;Precautions; ADL Adaptive Strategies;Transfer Training;Energy Conservation; Family Education  REQUIRES OT FOLLOW UP: Yes  Activity Tolerance  Activity Tolerance: Patient limited by fatigue;Patient Tolerated treatment well  Safety Devices  Safety Devices in place: Yes  Type of devices: Call light within reach;Nurse notified;Gait belt;Patient at risk for falls; Left in chair;Chair alarm in place         Patient Diagnosis(es): The primary encounter diagnosis was Parkinson's disease ([facility] [address] Diagnoses of Spondylosis of lumbar region without myelopathy or radiculopathy and Anxiety were also pertinent to this visit. has a past medical history of Cancer [facility]), Degenerative joint disease (DJD) of lumbar spine, Hearing loss, Hypertension, Osteoporosis, senile, Parkinson's disease ([facility] [address] Parkinson's disease ([facility] [address] and Tremor. has a past surgical history that includes Breast lumpectomy (Right); Tonsillectomy; Sigmoidoscopy; Colonoscopy; other surgical history; Mastectomy (Right, 2016); and other surgical history (2016).     Restrictions  Restrictions/Precautions  Restrictions/Precautions: General Precautions, Fall Risk  Required Braces or Orthoses?: Maximal assistance  Comment: Patient required Max A for BLE and BUE movement with verbal/tactile cues for proper sequencing/technique for log roll technique. Transfers  Sit to stand: Minimal assistance; Moderate assistance  Stand to sit: Minimal assistance; Moderate assistance  Transfer Comments: verbal/tactile cues for hand placements, controlled sit<>stand, squaring self up to surface      Cognition  Overall Cognitive Status: Exceptions  Arousal/Alertness: Appropriate responses to stimuli;Delayed responses to stimuli  Following Commands: Follows multistep commands with repitition; Follows multistep commands with increased time  Attention Span: Attends with cues to redirect  Memory: Decreased recall of precautions  Safety Judgement: Decreased awareness of need for assistance;Decreased awareness of need for safety  Problem Solving: Decreased awareness of errors;Assistance required to identify errors made;Assistance required to correct errors made;Assistance required to implement solutions  Insights: Decreased awareness of deficits  Initiation: Requires cues for some  Sequencing: Requires cues for some       Plan   Plan  Times per week: 4-5x/week  Times per day: Daily  Current Treatment Recommendations: Strengthening, Balance Training, Functional Mobility Training, Endurance Training, Safety Education & Training, Self-Care / ADL, Patient/Caregiver Education & Training, Equipment Evaluation, Education, & procurement, Cognitive Reorientation, Cognitive/Perceptual Training, Positioning, Neuromuscular Re-education  AM-PAC Score        AM-[facility] Inpatient Daily Activity Raw Score: 12 (06/14/21 1015)  AM-PAC Inpatient ADL T-Scale Score : 30.6 (06/14/21 1015)  ADL Inpatient CMS 0-100% Score: 66.57 (06/14/21 1015)  ADL Inpatient CMS G-Code Modifier : CL (06/14/21 1015)    Goals  Short term goals  Time Frame for Short term goals: by discharge, pt will  Short term goal 1: demo min A with ADL transfers and functional mob in room distances with min cues for good safety and approp AD/DME  Short term goal 2: demo min A with bed mobility with rails/controls and min cues for safety  Short term goal 3: demo mod A with toileting routine with min cues for good safety and sequencing  Short term goal 4: demo min A with self feeding and grooming tasks following set up at approp level with min cues for sequencing/follow through  Short term goal 5: demo min A with UB ADLs with min cues for initiation/sequencing  Patient Goals   Patient goals : not stated by pt       Therapy Time   Individual Concurrent Group Co-treatment   Time In 0850         Time Out 0918         Minutes 28           Upon writer exit, call light within reach, pt retired to chair. All lines intact and patient positioned comfortably. Chair alarm in place. All patient needs addressed prior to ending therapy session. Chart reviewed prior to treatment and patient is agreeable for therapy. RN reports patient is medically stable for therapy treatment this date.       [facility]

## 2021-06-14 NOTE — PLAN OF CARE
Nutrition Problem #1: Mild malnutrition  Intervention: Food and/or Nutrient Delivery: Continue Current Diet, Continue Oral Nutrition Supplement  Nutritional Goals: PO intakes are greater than 50% at meals

## 2021-06-14 NOTE — PROGRESS NOTES
Physical Therapy  DATE: 2021    NAME: Sachin Chaves  MRN: [id number]   : 1945    Patient not seen this date for Physical Therapy due to:  [] Blood transfusion in progress  [] Cancel by RN  [] Hemodialysis  [x]  Refusal by Patient (Patient and family states to check back later)  [] Spine Precautions   [] Strict Bedrest  [] Surgery  [] Testing      [] Other        [] PT being discontinued at this time. Patient independent. No further needs. [] PT being discontinued at this time as the patient has been transferred to hospice care. No further needs.     Quiana Thurston, PTA

## 2021-06-14 NOTE — FLOWSHEET NOTE
Patient seated in chair; states \"I just want to kiss my  and children goodbye\"; states she believes she is going to die today; states she dreamed it; becomes tearful; expresses feeling afraid; requests prayer for her family; prays to Abiel [address] for \"one more day\"; expresses regret for \"any bad things I've done\". Writer provides presence, support, reassurance, and prayer; offers patient absolution for sins; reminds patient of God's love for her. Spiritual Care will follow as needed. 06/14/21 1114   Encounter Summary   Services provided to: Patient   Referral/Consult From: Rounding;Palliative Care   Support System Spouse; Children   Continue Visiting   (6/14/21)   Complexity of Encounter Moderate   Length of Encounter 15 minutes   Routine   Type Follow up   Spiritual/Roman Catholic   Type Spiritual support   Assessment Approachable; Anxious; Anticipatory grief;Concerns with suffering   Intervention Active listening;Explored feelings, thoughts, concerns;Prayer;Sustaining presence/ Ministry of presence   Outcome Expressed gratitude;Expressed feelings/needs/concerns;Receptive

## 2021-06-14 NOTE — CARE COORDINATION
Social work: Call received from [facility] Dvaila- patient denied. Referral faxed to [facility] in [address] [place] [address] Ph: [phone]; fax: [phone]. As CM charted, net access is working with [facility] [employer] general on placement as well.

## 2021-06-14 NOTE — PROGRESS NOTES
Per family, pt not quite back to neurologic baseline, though has been progressively impaired for many years. Reportedly they now wish to pursue hospice. Will await final decision before final recs. Will follow.

## 2021-06-14 NOTE — PROGRESS NOTES
Comprehensive Nutrition Assessment    Type and Reason for Visit:  RD Nutrition Re-Screen/LOS, Initial (Length of stay)    Nutrition Recommendations/Plan:   1. Continue Regular diet  2. Continue Ensure Enlive 3x/day  3. Monitor p.o intakes and labs    Nutrition Assessment:  Patient admission is related to worsening tremors from Parkinson's disease. Patient appears to have at least mild malnutrition as evidence by 5.6% loss in 7 months and decreased p.o intakes. Patient is eating about 26-50% at meal time. Patient is upset today and told Saniya Julien that she thinks she will die today. Current plan and to discharge patient to a [facility] psych facility if available. Continue Regular diet and Ensure Enlive 3x/day. Monitor p.o intakes and labs. Malnutrition Assessment:  Malnutrition Status:  Mild malnutrition    Context:  Chronic Illness     Findings of the 6 clinical characteristics of malnutrition:  Energy Intake:  7 - 75% or less estimated energy requirements for 1 month or longer  Weight Loss:  1 - Mild weight loss (specify amount and time period) (5.6% loss in 7 months)     Body Fat Loss:  Unable to assess     Muscle Mass Loss:  Unable to assess    Fluid Accumulation:  No significant fluid accumulation Extremities   Strength:  Not Performed    Estimated Daily Nutrient Needs:  Energy (kcal):  5181-4740 kcal (25-27 kcal/kg); Weight Used for Energy Requirements:  Current     Protein (g):  55-60 gm (1.2-1.3 gm/kg); Weight Used for Protein Requirements:  Current          Nutrition Related Findings:  No edema. Active bowel sounds. PO intakes 26-50% (6/13/21). Parkinson's Disease with tremors      Wounds:  None       Current Nutrition Therapies:    Adult Oral Nutrition Supplement; Standard High Calorie/High Protein Oral Supplement  ADULT DIET;  Regular    Anthropometric Measures:  · Height: 5' (152.4 cm)  · Current Body Weight: 101 lb (45.8 kg)   · Admission Body Weight: 87 lb (39.5 kg) (bedside)    · Usual Body Weight:

## 2021-06-14 NOTE — PLAN OF CARE
Problem: Falls - Risk of:  Goal: Will remain free from falls  Description: Will remain free from falls  6/13/2021 2253 by Glenna Ladd RN  Outcome: Ongoing     Problem: Mental Status - Impaired:  Goal: Mental status will improve  Description: Mental status will improve  Outcome: Ongoing

## 2021-06-14 NOTE — PROGRESS NOTES
Subjective:     Follow-up Parkinson  Patient much calmer today no aggressive tendencies  ROS  No fever no chills no GI/ complaints no cardiopulmonary complaints no TIA no bleeding no headache no sore throat no skin lesions no polyuria polydipsia or  physical exam  General Appearance: in no acute distress and alert  Skin: warm and dry, no rash or erythema  Head: normocephalic and atraumatic  Eyes: pupils equal, round, and reactive to light and sclera anicteric     Neck: neck supple and non tender without mass   Pulmonary/Chest: clear to auscultation bilaterally- no wheezes, rales or rhonchi, normal air movement, no respiratory distress  Cardiovascular: normal rate, regular rhythm, normal S1 and S2, no gallops, intact distal pulses and no carotid bruits  Abdomen: soft, non-tender, non-distended, normal bowel sounds, no masses or organomegaly  Extremities: no edema and good pulses no Homans' sign    Neurologic: Alert oriented x2+ tremors positive rigidity    BP (!) 89/51   Pulse 88   Temp 98.2 °F (36.8 °C) (Axillary)   Resp 17   Ht 5' (1.524 m)   Wt 101 lb (45.8 kg)   SpO2 94%   BMI 19.73 kg/m²     CBC:   Lab Results   Component Value Date    WBC 12.5 06/13/2021    RBC 3.59 06/13/2021    HGB 10.8 06/13/2021    HCT 36.0 06/13/2021    .3 06/13/2021    MCH 30.1 06/13/2021    MCHC 30.0 06/13/2021    RDW 14.0 06/13/2021     06/13/2021    MPV 9.1 06/13/2021     BMP:    Lab Results   Component Value Date     06/12/2021    K 3.7 06/12/2021     06/12/2021    CO2 24 06/12/2021    BUN 15 06/12/2021    LABALBU 3.8 06/07/2021    CREATININE 0.42 06/12/2021    CALCIUM 9.5 06/12/2021    GFRAA >60 06/12/2021    LABGLOM >60 06/12/2021    GLUCOSE 103 06/12/2021        Assessment:  Patient Active Problem List   Diagnosis    Parkinson's disease ([facility] [facility]    Spondylosis of lumbar region without myelopathy or radiculopathy    Acute cystitis    Parkinson's disease (tremor, stiffness, slow motion, unstable posture) (HCC)    Mild malnutrition (HCC)     Parkinson's disease acute encephalopathy and possible dementia  Mild leukocytosis  No signs of overt urinary tract infections  Chest x-ray possible pulmonary nodules we will repeat chest x-ray  Mildly elevated troponin  we will ask cardiology to see the insignificant  Plan:    Meds labs reviewed we will give IV fluids see orders continue physical therapy occupational therapy patient medically stable for discharge still awaiting placement      Leoncio Kumar MD MD  7:28 PM

## 2021-06-14 NOTE — PROGRESS NOTES
Speech Language Pathology  Speech Language Pathology  [address] [facility]    Cognitive Treatment Note    Date: 2021  Patients Name: Jessica Marin  MRN: [id number]  Diagnosis:   Patient Active Problem List   Diagnosis Code    Parkinson's disease ([facility] G20    Spondylosis of lumbar region without myelopathy or radiculopathy M47.816    Acute cystitis N30.00    Parkinson's disease (tremor, stiffness, slow motion, unstable posture) ([facility] [address] G20     Pain: 0/10    Cognitive Treatment    Treatment time: 10:00-10:11    Subjective: [x] Alert [x] Cooperative     [] Confused     [] Agitated    [] Lethargic    Objective/Assessment:    Orientation: Pt oriented to name, birthday, month, and place. Disoriented to year (statede ) and age (stated 76). Recall:   Delayed recall, 3 units (breakfast from this AM): 3/3 x1 independently; pt declined to attempt recall with 3 associated words. Immediate recall, 3 units: 3/3, 3/3                                5 units: 4/5 increased to 5/5 with 1 repetition     Organization:   Word generation, concrete: 4 units named in 30 seconds, pt declined to attempt for additional answers despite encouragement form ST. Problem Solving/Reasoning:   Inductive reasonin/4  Similarities and Differences: 4/4    Safety/Judgement:   Thought Flexibility: 2/3 increased 3/3 with min verbal cues   Task Insight: 2/3 increased to 3/3 with min verbal cues     Other: ST session d/c early as pt stated \"I'm done, I just want to rest.\" ST to continue to follow. Plan:  [x] Continue ST services    [] Discharge from ST:      Discharge recommendations: Further therapy recommended at discharge.     Treatment completed by: Judi Frank, SLP, M.S. Quilla Cogan

## 2021-06-14 NOTE — PLAN OF CARE
Problem: Falls - Risk of:  Goal: Will remain free from falls  Description: Will remain free from falls  6/14/2021 1014 by Sujata Garcia RN  Outcome: Ongoing  Pt fall risk, fall band present, falling star, safety alarm activated and in use as needed. Hourly rounding performed. Pt encouraged to use call light. See Heather Otero fall risk assessment.

## 2021-06-14 NOTE — CARE COORDINATION
Discharge planning    [facility]ing to [facility] patient in [facility] . Having difficulty finding a location to accept. Sola Mckeon has been reviewing but keeps coming back to have more tests ordered. Stated the CXR would need to have a follow up CT before they would consider transfer. Call to [facility] to see if also working other locations. At one time they were also looking at [facility] did not have beds last week but will discuss with SS to see if that has changed    NET [facility]   Phone is [phone]  Fax [phone]  Call from [facility] and [address] [facility] general near Skye Gooden is reviewing patient chart. Requested EKG ( faxed to them at [phone])      Call and spoke with Stormy Zamorano and updated on above. He was going to speak with sanjuana and both were coming to unit. 1320  Family at bedside and discussed plan of care. They are all in agreement with location of [facility].  Call to [facility] access and notified. They will precede with referral     1410  Called into room per family. [facility] on the phone and received paperwork writer sent this am. They are requesting patient to come next week for an appointment to see if can move up timetable of surgery    Call to net access to see if patient is allowed to have furlough from [address] [facility] for appointments. If not may have to have telepsych re vist patient and see if ok to discharge home with family. Call to [address] [facility] nurse line ( at [phone]) and patient would not be able to leave for an appointment. She would also need to be pink slipped. .. if patient to go to them pink slip to be faxed to  [phone]

## 2021-06-14 NOTE — CARE COORDINATION
Social work:  Writer informed by Carson/RN DANIELLE, after speaking with neurology family considering stopping further tx at [facility] and family is considering hospice. Will need tele psych to re-eval to determine if patient needs to be pink slipped to [facility] psych facility [facility] following, but would not hold bed). Writer reached out to Demetrio Mae, [address], [place] [id number], [address] [facility] [address] and Gary Simpson for ability to [facility] facility has accepted yet, they will want to see tele psych eval recommendations.

## 2021-06-15 LAB
ABSOLUTE EOS #: 0.21 K/UL (ref 0–0.44)
ABSOLUTE IMMATURE GRANULOCYTE: 0.05 K/UL (ref 0–0.3)
ABSOLUTE LYMPH #: 0.88 K/UL (ref 1.1–3.7)
ABSOLUTE MONO #: 0.71 K/UL (ref 0.1–1.2)
ANION GAP SERPL CALCULATED.3IONS-SCNC: 11 MMOL/L (ref 9–17)
BASOPHILS # BLD: 1 % (ref 0–2)
BASOPHILS ABSOLUTE: 0.08 K/UL (ref 0–0.2)
BUN BLDV-MCNC: 14 MG/DL (ref 8–23)
BUN/CREAT BLD: 31 (ref 9–20)
CALCIUM SERPL-MCNC: 9.2 MG/DL (ref 8.6–10.4)
CHLORIDE BLD-SCNC: 106 MMOL/L (ref 98–107)
CO2: 26 MMOL/L (ref 20–31)
CREAT SERPL-MCNC: 0.45 MG/DL (ref 0.5–0.9)
DIFFERENTIAL TYPE: ABNORMAL
EKG ATRIAL RATE: 114 BPM
EKG P AXIS: 73 DEGREES
EKG P-R INTERVAL: 86 MS
EKG Q-T INTERVAL: 344 MS
EKG QRS DURATION: 96 MS
EKG QTC CALCULATION (BAZETT): 474 MS
EKG R AXIS: 72 DEGREES
EKG T AXIS: 53 DEGREES
EKG VENTRICULAR RATE: 114 BPM
EOSINOPHILS RELATIVE PERCENT: 3 % (ref 1–4)
GFR AFRICAN AMERICAN: >60 ML/MIN
GFR NON-AFRICAN AMERICAN: >60 ML/MIN
GFR SERPL CREATININE-BSD FRML MDRD: ABNORMAL ML/MIN/{1.73_M2}
GFR SERPL CREATININE-BSD FRML MDRD: ABNORMAL ML/MIN/{1.73_M2}
GLUCOSE BLD-MCNC: 92 MG/DL (ref 70–99)
HCT VFR BLD CALC: 38.4 % (ref 36.3–47.1)
HEMOGLOBIN: 11.5 G/DL (ref 11.9–15.1)
IMMATURE GRANULOCYTES: 1 %
LYMPHOCYTES # BLD: 12 % (ref 24–43)
MCH RBC QN AUTO: 30.2 PG (ref 25.2–33.5)
MCHC RBC AUTO-ENTMCNC: 29.9 G/DL (ref 28.4–34.8)
MCV RBC AUTO: 100.8 FL (ref 82.6–102.9)
MONOCYTES # BLD: 10 % (ref 3–12)
MYOGLOBIN: 23 NG/ML (ref 25–58)
MYOGLOBIN: 35 NG/ML (ref 25–58)
NRBC AUTOMATED: 0 PER 100 WBC
PDW BLD-RTO: 13.9 % (ref 11.8–14.4)
PLATELET # BLD: 338 K/UL (ref 138–453)
PLATELET ESTIMATE: ABNORMAL
PMV BLD AUTO: 9.6 FL (ref 8.1–13.5)
POTASSIUM SERPL-SCNC: 3.7 MMOL/L (ref 3.7–5.3)
RBC # BLD: 3.81 M/UL (ref 3.95–5.11)
RBC # BLD: ABNORMAL 10*6/UL
SEG NEUTROPHILS: 74 % (ref 36–65)
SEGMENTED NEUTROPHILS ABSOLUTE COUNT: 5.5 K/UL (ref 1.5–8.1)
SODIUM BLD-SCNC: 143 MMOL/L (ref 135–144)
TROPONIN INTERP: ABNORMAL
TROPONIN INTERP: ABNORMAL
TROPONIN T: ABNORMAL NG/ML
TROPONIN T: ABNORMAL NG/ML
TROPONIN, HIGH SENSITIVITY: 21 NG/L (ref 0–14)
TROPONIN, HIGH SENSITIVITY: 23 NG/L (ref 0–14)
WBC # BLD: 7.4 K/UL (ref 3.5–11.3)
WBC # BLD: ABNORMAL 10*3/UL

## 2021-06-15 PROCEDURE — 36415 COLL VENOUS BLD VENIPUNCTURE: CPT

## 2021-06-15 PROCEDURE — 99232 SBSQ HOSP IP/OBS MODERATE 35: CPT | Performed by: PSYCHIATRY & NEUROLOGY

## 2021-06-15 PROCEDURE — 80048 BASIC METABOLIC PNL TOTAL CA: CPT

## 2021-06-15 PROCEDURE — 6360000002 HC RX W HCPCS: Performed by: PSYCHIATRY & NEUROLOGY

## 2021-06-15 PROCEDURE — 83874 ASSAY OF MYOGLOBIN: CPT

## 2021-06-15 PROCEDURE — 93010 ELECTROCARDIOGRAM REPORT: CPT | Performed by: INTERNAL MEDICINE

## 2021-06-15 PROCEDURE — 85025 COMPLETE CBC W/AUTO DIFF WBC: CPT

## 2021-06-15 PROCEDURE — 2580000003 HC RX 258: Performed by: INTERNAL MEDICINE

## 2021-06-15 PROCEDURE — 2060000000 HC ICU INTERMEDIATE R&B

## 2021-06-15 PROCEDURE — 84484 ASSAY OF TROPONIN QUANT: CPT

## 2021-06-15 PROCEDURE — 6370000000 HC RX 637 (ALT 250 FOR IP): Performed by: PSYCHIATRY & NEUROLOGY

## 2021-06-15 PROCEDURE — 6360000002 HC RX W HCPCS: Performed by: INTERNAL MEDICINE

## 2021-06-15 PROCEDURE — 6370000000 HC RX 637 (ALT 250 FOR IP): Performed by: INTERNAL MEDICINE

## 2021-06-15 RX ADMIN — LORAZEPAM 0.5 MG: 2 INJECTION, SOLUTION INTRAMUSCULAR; INTRAVENOUS at 02:11

## 2021-06-15 RX ADMIN — QUETIAPINE FUMARATE 25 MG: 25 TABLET ORAL at 20:23

## 2021-06-15 RX ADMIN — TRAZODONE HYDROCHLORIDE 50 MG: 50 TABLET ORAL at 20:23

## 2021-06-15 RX ADMIN — ENOXAPARIN SODIUM 30 MG: 30 INJECTION SUBCUTANEOUS at 08:59

## 2021-06-15 RX ADMIN — BACLOFEN 5 MG: 10 TABLET ORAL at 20:23

## 2021-06-15 RX ADMIN — ZIPRASIDONE MESYLATE 10 MG: 20 INJECTION, POWDER, LYOPHILIZED, FOR SOLUTION INTRAMUSCULAR at 03:04

## 2021-06-15 RX ADMIN — LORAZEPAM 0.5 MG: 0.5 TABLET ORAL at 17:17

## 2021-06-15 RX ADMIN — SODIUM CHLORIDE, PRESERVATIVE FREE 10 ML: 5 INJECTION INTRAVENOUS at 20:47

## 2021-06-15 RX ADMIN — HYDRALAZINE HYDROCHLORIDE 5 MG: 20 INJECTION INTRAMUSCULAR; INTRAVENOUS at 12:57

## 2021-06-15 NOTE — PLAN OF CARE
Problem: Nutrition  Goal: Optimal nutrition therapy  Outcome: Ongoing     Problem: Skin Integrity:  Goal: Will show no infection signs and symptoms  Description: Will show no infection signs and symptoms  Outcome: Ongoing     Problem: Mental Status - Impaired:  Goal: Mental status will improve  Description: Mental status will improve  Outcome: Ongoing     Problem: Falls - Risk of:  Goal: Will remain free from falls  Description: Will remain free from falls  Outcome: Ongoing

## 2021-06-15 NOTE — PROGRESS NOTES
notified writer that pt was trying to pull IV pole over with pulling on tubing. Writer in to redirect pt and pt removed IV from right arm. Tubing disconnected from bag and banging IV pole into bed. Pt not redirectable. Dr Alexx Yoder notified and states to give Prn dose of Geodon and restart new IV once pt is calm.

## 2021-06-15 NOTE — PLAN OF CARE
Problem: Falls - Risk of:  Goal: Will remain free from falls  Description: Will remain free from falls  6/14/2021 2035 by Glenna Ladd RN  Outcome: Ongoing     Problem: Mental Status - Impaired:  Goal: Mental status will improve  Description: Mental status will improve  6/14/2021 2035 by Glenna Ladd RN  Outcome: Ongoing

## 2021-06-15 NOTE — PROGRESS NOTES
Writer in with pt r/t pt pulling at IV tubing, pt was able to get tubing apart from bag and NS spilling all over bed onto pt, when trying to get tubing out of pt grasp, pt swinging arms around trying to hit writer and PCT. Tubing able to be removed from pt hands and new tubing placed with NS running.  Pt still restless, IV Ativan given 0.5 mg. normal...

## 2021-06-15 NOTE — PROGRESS NOTES
Physical Therapy  DATE: 6/15/2021    NAME: Helio Doty  MRN: [id number]   : 1945    Patient not seen this date for Physical Therapy due to:  [] Blood transfusion in progress  [x] Cancel by [facility] patient given sedative and is not appropriate today. [] Hemodialysis  []  Refusal by Patient   [] Spine Precautions   [] Strict Bedrest  [] Surgery  [] Testing      [] Other        [] PT being discontinued at this time. Patient independent. No further needs. [] PT being discontinued at this time as the patient has been transferred to hospice care. No further needs.     Felicita Ness, PTA

## 2021-06-15 NOTE — PROGRESS NOTES
Geodon given. Pt currently resting in bed. New IV placed and cont pulse ox in place to monitor pt HR and oxygen. Morales Hong

## 2021-06-15 NOTE — PROGRESS NOTES
Acute cystitis    Parkinson's disease (tremor, stiffness, slow motion, unstable posture) (HCC)    Mild malnutrition (HCC)   Hypertension essential  Parkinson's disease with acute encephalopathy and possible dementia and psychosis with hallucinations and paranoia  Leukocytosis resolved  Mildly elevated troponins  Pulmonary nodules likely summation shadows from rib fractures  Plan:    Meds labs reviewed patient medically stable awaiting discharge planning to the [facility] psych or rehab same treatments physical therapy occupational therapy      Dheeraj Yarbrough MD MD  5:16 PM

## 2021-06-15 NOTE — PROGRESS NOTES
Patient lethargic this morning. Per report patient was given IM Geodon at [facility] for combativeness/confusion. Patient somewhat arousable, extremities move with stimulus. AM assessment completed, morning meds held, and patient repositioned for comfort. Bed exit armed and call light within reach. Will continue to monitor with hourly rounding.

## 2021-06-15 NOTE — CARE COORDINATION
Discharge planning    Patient has had a calm day. No episodes of aggression or acting out. Tele psych attempted to see patient at 1100 but was not communicated to the day RN. Dr Brad Alexander up and evaluated patient. Pink slip required for any admission en[facility]X [facility] [place]. .. Dr Brad Alexander  is requesting telepsych to see patient. If recommendation of pink slip to stabilize medications for aggressive behavior he will then fill out pink slip. Updated [place] RN     Met with patient and she is calm. Holding writer hand and said that she wants to go home. She does not want anything else done. At one point patient asked patient spouse and daughter to turn around. They did turn around. When writer asked why she stated \" I want to remember them from the front and the back\"    Discussed with family and they wish for [facility] for hospice. Call to  hospice intake and they will assess. Sent per epic link. They will contact family and plan on direct admission to the home. Updated family    telepsych to see patient at 200 today. Will anticipate hopeful discharge to home with family with hospice thru [facility] tomorrow if cleared per psych.      Family will work with [facility] memory care [facility] and [facility] with hospice for admission

## 2021-06-15 NOTE — PROGRESS NOTES
Attempted to see the patient by telem device for an appointment that was scheduled 11 AM this morning. Device is not in the patient room. Primary team can call psychiatry at [phone] to discuss care and recommendations.     Electronically signed by Kalyn Azul MD on 6/15/21 at 11:12 AM EDT

## 2021-06-15 NOTE — CARE COORDINATION
Social work: Msg from [facility], case sent to regional office for review. Also received msg from Stacey/Bebe, questioning supporting diagnosis for Geodon and Seroquel. [facility] also following. Call from [facility]- patient denied.

## 2021-06-15 NOTE — CARE COORDINATION
Discharge planning    Patient chart reviewed. Patient was calm all day shift yesterday with no issues with agitation. Spoke with night RN Shayna  Patient requiring Geodon for agitation. Attending rounded last night. CXR to be repeated today and cardiology to see due to increase troponins. Patient seen by neurology yesterday. Awaiting family decision regarding possible hospice. telepsych has repeat evaluation today. That was ordered based on her improved behaviors yesterday . Will await to see if still recommending [facility] and if they would be one to pink slip if needed. Will need to discuss with attending today regarding proceeding with pink slip to [facility] vs hospice but unsure of where she can go. Family states unsure if can taker care of her at home. Spouse was to call hospice [facility] last night to discuss options. 18  Dr Cristy Gan up to unit and discussed the above. He will plan on being here between 2-3 and wishes to speak with the family. Meche Ochoa discussed with [facility] and will need to call family to have them present if possible. [phone]  Call to spouse and updated on family meeting between 2-3. Still awaiting pysch determinations.  If psych states pink slip is recommended that Dr Bran Rogers will proceed with that and will need to call net access to open up and refer to [facility] again

## 2021-06-15 NOTE — PROGRESS NOTES
Occupational Therapy  DATE: 6/15/2021    NAME: Angélica Glover  MRN: [id number]   : 1945    Patient not seen this date for Occupational Therapy due to:  [] Blood transfusion in progress  [] Cancel by RN  [] Hemodialysis  []  Refusal by Patient   [] Spine Precautions   [] Strict Bedrest  [] Surgery  [] Testing      [x] Other-6/15 hold per [facility] as pt with combative episode last night and given meds and not appropriate at this time        [] PT being discontinued at this time. Patient independent. No further needs. [] PT being discontinued at this time as the patient has been transferred to hospice care. No further needs.     Corwin Michaels OT

## 2021-06-16 VITALS
SYSTOLIC BLOOD PRESSURE: 159 MMHG | RESPIRATION RATE: 16 BRPM | TEMPERATURE: 98.1 F | DIASTOLIC BLOOD PRESSURE: 100 MMHG | WEIGHT: 104.4 LBS | HEART RATE: 65 BPM | BODY MASS INDEX: 20.5 KG/M2 | HEIGHT: 60 IN | OXYGEN SATURATION: 94 %

## 2021-06-16 PROCEDURE — 6370000000 HC RX 637 (ALT 250 FOR IP): Performed by: INTERNAL MEDICINE

## 2021-06-16 PROCEDURE — 99232 SBSQ HOSP IP/OBS MODERATE 35: CPT | Performed by: NURSE PRACTITIONER

## 2021-06-16 PROCEDURE — 97116 GAIT TRAINING THERAPY: CPT

## 2021-06-16 PROCEDURE — 6370000000 HC RX 637 (ALT 250 FOR IP): Performed by: PSYCHIATRY & NEUROLOGY

## 2021-06-16 PROCEDURE — 6360000002 HC RX W HCPCS: Performed by: INTERNAL MEDICINE

## 2021-06-16 PROCEDURE — 2580000003 HC RX 258: Performed by: INTERNAL MEDICINE

## 2021-06-16 PROCEDURE — 97530 THERAPEUTIC ACTIVITIES: CPT

## 2021-06-16 RX ORDER — LORAZEPAM 0.5 MG/1
0.5 TABLET ORAL EVERY 12 HOURS PRN
Qty: 14 TABLET | Refills: 0 | Status: SHIPPED | OUTPATIENT
Start: 2021-06-16 | End: 2021-07-16

## 2021-06-16 RX ORDER — QUETIAPINE FUMARATE 25 MG/1
25 TABLET, FILM COATED ORAL NIGHTLY
Qty: 60 TABLET | Refills: 0 | Status: SHIPPED | OUTPATIENT
Start: 2021-06-16

## 2021-06-16 RX ORDER — AMLODIPINE BESYLATE 5 MG/1
5 TABLET ORAL 2 TIMES DAILY
Qty: 30 TABLET | Refills: 3 | Status: SHIPPED | OUTPATIENT
Start: 2021-06-16

## 2021-06-16 RX ADMIN — ENOXAPARIN SODIUM 30 MG: 30 INJECTION SUBCUTANEOUS at 08:48

## 2021-06-16 RX ADMIN — LORAZEPAM 0.5 MG: 0.5 TABLET ORAL at 12:15

## 2021-06-16 RX ADMIN — ESCITALOPRAM OXALATE 20 MG: 10 TABLET ORAL at 08:47

## 2021-06-16 RX ADMIN — ACETAMINOPHEN 650 MG: 325 TABLET ORAL at 01:41

## 2021-06-16 RX ADMIN — LORAZEPAM 0.5 MG: 0.5 TABLET ORAL at 01:40

## 2021-06-16 RX ADMIN — AMLODIPINE BESYLATE 5 MG: 5 TABLET ORAL at 08:48

## 2021-06-16 RX ADMIN — LORAZEPAM 0.5 MG: 0.5 TABLET ORAL at 07:01

## 2021-06-16 RX ADMIN — OXYCODONE AND ACETAMINOPHEN 1 TABLET: 5; 325 TABLET ORAL at 13:00

## 2021-06-16 RX ADMIN — BACLOFEN 5 MG: 10 TABLET ORAL at 08:48

## 2021-06-16 RX ADMIN — SODIUM CHLORIDE, PRESERVATIVE FREE 10 ML: 5 INJECTION INTRAVENOUS at 08:47

## 2021-06-16 ASSESSMENT — PAIN SCALES - GENERAL
PAINLEVEL_OUTOF10: 5
PAINLEVEL_OUTOF10: 3

## 2021-06-16 NOTE — PROGRESS NOTES
Palliative Interaction:  Patient  and daughter at bedside. Patient resting in bed without any distress or complaints. Patient going home today at 1 pm with [facility]. Patient and family state they are ready. Emotional support provided. Education/support to family  Education/support to patient  Discharge planning/helping to coordinate care  Communications with primary service  Pharmacologic pain management  Providing support for coping/adaptation/distress of family  Providing support for coping/adaptation/distress of patient  Discussing meaning/purpose   Caregiver support/education  Continue with current plan of care  Code status clarified: [facility]  Provided information about hospice  Principle Problem/Diagnosis:  Parkinson disease  Additional Assessments:  Active Problems:    Parkinson's disease (tremor, stiffness, slow motion, unstable posture) ([facility])    Mild malnutrition ([address]  Resolved Problems:    * No resolved hospital problems. *    1- Symptom management/ pain control     Pain Assessment:  Pain is controlled with current analgesics. Medication(s) being used: acetaminophen and Percocet                Anxiety:  Ativan and Geodan                           Dyspnea:  none                          Fatigue:  generalized weakness     Other:      2- Goals of care evaluation   The patient goals of care are provide comfort care/support/palliation/relieve suffering   Goals of care discussed with:    [] Patient independently    [x] Patient and Family    [] Family or Healthcare DPOA independently    [] Unable to discuss with patient, family/DPOA not present    3- Code Status  DNR-CCA    4- Other recommendations  - We will continue to provide comfort and support to the patient and the family      Palliative Care will continue to follow Ms. Shauna Aleman care as needed.     The note has been dictated by dragon, typing errors may be a possibility     Thank you for allowing Palliative Care to participate in the care of Ms. Jaye Schuster . Electronically signed by   SHERICE Mao NP  Palliative Care Team  on 6/16/2021 at 10:45 AM    Palliative care can be reached via perfect serve.

## 2021-06-16 NOTE — PLAN OF CARE
Problem: Falls - Risk of:  Goal: Will remain free from falls  Description: Will remain free from falls  6/16/2021 0856 by Darvin Hurd RN  Outcome: Ongoing  Pt fall risk, fall band present, falling star, safety alarm activated and in use as needed. Hourly rounding performed. Pt encouraged to use call light. See Napolean Felty fall risk assessment.

## 2021-06-16 NOTE — PLAN OF CARE
Problem: Falls - Risk of:  Goal: Will remain free from falls  Description: Will remain free from falls  Outcome: Ongoing  Note: Siderails up x 2  Hourly rounding  Call light in reach  Instructed to call for assist before attempting out of bed. Remains free from falls and accidental injury at this time   Floor free from obstacles  Bed is locked and in lowest position  Adequate lighting provided  Bed alarm on, Red Falling star and Stay with Me signs posted  Will continue to monitor. Problem: Mental Status - Impaired:  Goal: Mental status will improve  Description: Mental status will improve  Outcome: Ongoing     Problem: Coping:  Goal: Ability to remain calm will improve  Description: Ability to remain calm will improve  Outcome: Ongoing     Problem: Safety:  Goal: Ability to remain free from injury will improve  Description: Ability to remain free from injury will improve  Outcome: Ongoing     Problem: Self-Care:  Goal: Ability to participate in self-care as condition permits will improve  Description: Ability to participate in self-care as condition permits will improve  Outcome: Ongoing     Problem: Skin Integrity:  Goal: Absence of new skin breakdown  Description: Absence of new skin breakdown  Outcome: Ongoing  Note: Checked for incontinence every 2 hours and prn. Pericare as needed. Assisted to reposition off back frequently. On inflated waffle mattress. Heels off bed with pillows. Mepilex to coccyx for prevention. Will continue to monitor.        Problem: Nutrition  Goal: Optimal nutrition therapy  Outcome: Ongoing     Problem: IP MOBILITY  Goal: STG - Patient will ambulate  Outcome: Ongoing     Problem: IP TRANSFERS  Goal: LTG - patient will transfer to commode  Outcome: Ongoing     Problem: IP TRANSFERS  Goal: STG - transfer from bed to chair  Outcome: Ongoing     Problem: IP TRANSFERS  Goal: STG - patient will perform bed mobility  Outcome: Ongoing

## 2021-06-16 NOTE — PROGRESS NOTES
Occupational Therapy  DATE: 2021    NAME: Vinicio Napier  MRN: [id number]   : 1945    Patient not seen this date for Occupational Therapy due to:  [] Blood transfusion in progress  [] Cancel by RN  [] Hemodialysis  []  Refusal by Patient   [] Spine Precautions   [] Strict Bedrest  [] Surgery  [] Testing      [x] Other- cx as RN states pt DC home today with hospice and PT states she walked with pt and is exhausted after and will not catherine OT tx        [] PT being discontinued at this time. Patient independent. No further needs. [] PT being discontinued at this time as the patient has been transferred to hospice care. No further needs.     Kesha Schuler, OT

## 2021-06-16 NOTE — CARE COORDINATION
Discharge planning    Chart reviewed. Per night RN patient had better night. No aggressive behavior nor combativeness. Patient was seen by telepysch last night and is in agreement with sending home patient with hospice care. [facility] was consulted last night per family choice and they were to call spouse and set up any needed DME in the home. Anticipate discharge home today with [facility] hospice. Will need to discuss with spouse if will be transporting patient home vs ambulance. Patient to go home today with family transporting. Call to Berta at [facility] to update on patient going home with hospice thru [facility]. . VM left.

## 2021-06-16 NOTE — PROGRESS NOTES
without myelopathy or radiculopathy and Anxiety were also pertinent to this visit. has a past medical history of Cancer [facility]), Degenerative joint disease (DJD) of lumbar spine, Hearing loss, Hypertension, Osteoporosis, senile, Parkinson's disease ([facility] Parkinson's disease ([facility] and Tremor. has a past surgical history that includes Breast lumpectomy (Right); Tonsillectomy; Sigmoidoscopy; Colonoscopy; other surgical history; Mastectomy (Right, 08/23/2016); and other surgical history (12/13/2016). Restrictions  Restrictions/Precautions  Restrictions/Precautions: General Precautions, Fall Risk  Required Braces or Orthoses?: No  Position Activity Restriction  Other position/activity restrictions: up with assist, ALARMS, LUE IV  Subjective   General  Chart Reviewed: Yes  Response To Previous Treatment: Patient with no complaints from previous session. Family / Caregiver Present: No  Subjective  Subjective: Patient agreeable to PT treatment  General Comment  Comments: FLORENTIN Mcgill PT          Orientation  Orientation  Overall Orientation Status: Impaired  Orientation Level: Disoriented to place; Disoriented to situation  Cognition      Objective   Bed mobility  Bridging: Moderate assistance;Minimal assistance  Rolling to Left: Minimal assistance; Moderate assistance  Rolling to Right: Moderate assistance;Minimal assistance  Supine to Sit: Moderate assistance;Minimal assistance  Sit to Supine: Unable to assess  Scooting: Moderate assistance;Minimal assistance  Comment: Patient requires increased verbal and visual cues for hand placement and progression techniques for bed mobility  Transfers  Sit to Stand: Minimal Assistance; Moderate Assistance  Stand to sit: Minimal Assistance; Moderate Assistance  Bed to Chair: Minimal assistance; Moderate assistance  Stand Pivot Transfers: Minimal Assistance; Moderate Assistance  Lateral Transfers: Minimal Assistance; Moderate Assistance  Comment: Patient requires max vc's and redirection to focus on transfer training techniques. Patient with noted improvement in motor planning and task management today. Ambulation  Ambulation?: Yes  Ambulation 1  Surface: level tile  Device: Rolling Walker  Assistance: Moderate assistance;Minimal assistance  Quality of Gait: Patient with slow shuffling gait pattern requires increased assist with turning skills due to almost festinating pattern. Distance: 7 feet x4 to and  from commode  Comments: Patient requiers vc's for hand placement and progression techniques. Patient requires increased time for safety and education to maximize ambulation tasks. Stairs/Curb  Stairs?: No  Neuromuscular Education  NDT Treatment: Lower extremity; Sitting  Neuromuscular Comments: Patient worked on standing balance and posture to promote safety in ambulation tasks. Patient requires vc's for posture in stance. Balance  Comments: w/ Rw for standing balance  Exercises  Comments: Patient education on seated posture and OOB activities to decrease secondary complications. Patient able to sit static for 3 minutes while working on ADL's to promote stability and trunk control. AM-PAC Score  -PAC Inpatient Mobility Raw Score : 11 (06/16/21 1126)  -PAC Inpatient T-Scale Score : 33.86 (06/16/21 1126)  Mobility Inpatient CMS 0-100% Score: 72.57 (06/16/21 1126)  Mobility Inpatient CMS G-Code Modifier : CL (06/16/21 1126)          Goals  Short term goals  Time Frame for Short term goals: 12 visits  Short term goal 1: Inc bed-mobility & transfers to independent to enable pt to safely get in/OOB & chair  Short term goal 2: Inc gait to amb 150ft or > indep w/ RW to enable pt to return to previous level of independence  Short term goal 3:  Inc strength to [facility] & standing balance to good with device to facilitate pt independence for performance of ADL's & functional mobility, & reduce fall risk  Short term goal 4: Pt able to tolerate 30-40 min of activity to include 15-20 reps of ex, NMR & functional mobility including 5 minutes of standing with device to facilitate activity tolerance to [facility]  Short term goal 5: Ed pt on home ex's, safety & energy principles, fall prevention, & issue written home program    Plan    Plan  Times per week: 1-2x/d, 5-6D/week  Current Treatment Recommendations: Strengthening, Balance Training, Functional Mobility Training, Transfer Training, Endurance Training, Gait Training, Cognitive Reorientation, Home Exercise Program, Safety Education & Training, Patient/Caregiver Education & Training  Safety Devices  Type of devices: Call light within reach, Gait belt, Patient at risk for falls, Nurse notified, Left in chair, Chair alarm in place  Restraints  Initially in place: No     Therapy Time   Individual Concurrent Group Co-treatment   Time In 0840         Time Out 0920         Minutes [address] [place] [address]

## 2021-06-16 NOTE — PROGRESS NOTES
Discharge teaching and instructions completed with patient using teachback method. AVS reviewed. Patient's family voiced understanding regarding prescriptions, follow up appointments, and care of self at home. Discharged home with spouse and daughter via wheel chair with hospice. All questions answered.

## 2021-06-20 NOTE — DISCHARGE SUMMARY
Physician Discharge Summary     Patient ID:  Fernando Wallis  [id number]  56 y.o.  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  Leukocytosis  Mildly elevated troponins  Pulmonary nodules  Essential hypertension    Consults: neurology and psychiatry palliative care    Procedures: None  Hospital Course: 68-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 apparently has been off of her Parkinson medication for a week comes to the emergency room with unsteadiness shakiness tremors rigidity unable to ambulate family could not take care of her was admitted neurology consultation during her stay she was having pruritus of paranoia agitation and hallucinations needed have antipsychotic medication psychiatry were consulted patient was medically stable could not be transferred to any [facility] psych or skilled nursing facility finally family decided on home with hospice  Discharge Exam:  See progress note from today    Disposition: home  With hospice  Patient Instructions:   Discharge Medication List as of 6/16/2021 12:47 PM      START taking these medications    Details   QUEtiapine (SEROQUEL) 25 MG tablet Take 1 tablet by mouth nightly, Disp-60 tablet, R-0Print      amLODIPine (NORVASC) 5 MG tablet Take 1 tablet by mouth 2 times daily, Disp-30 tablet, R-3Print         CONTINUE these medications which have CHANGED    Details   LORazepam (ATIVAN) 0.5 MG tablet Take 1 tablet by mouth every 12 hours as needed for Anxiety for up to 30 days. , Disp-14 tablet, R-0Print      oxyCODONE-acetaminophen (PERCOCET) 5-325 MG per tablet Take 1 tablet by mouth every 6 hours as needed for Pain for up to 7 days. , Disp-13 tablet, R-0Print      Carbidopa-Levodopa ER 48. MG CPCR Take 1 capsule by mouth 5 times daily, Disp-90 capsuleDC to SNF      Carbidopa-Levodopa ER (RYTARY) 36. MG CPCR Take 1 capsule by mouth 2 times daily as needed (for parkinsons symptoms), Disp-90 capsuleDC to SNF         CONTINUE these medications which have NOT CHANGED    Details   Simethicone (GAS-X PO) Take 1 tablet by mouth 2 times daily as neededHistorical Med      NONFORMULARY Take 2 tablets by mouth daily Adult gummie calcium 500 mg each, two each morningHistorical Med      Psyllium (DAILY FIBER PO) Take 2 capsules by mouth dailyHistorical Med      albuterol sulfate HFA (PROVENTIL HFA) 108 (90 Base) MCG/ACT inhaler Inhale 2 puffs into the lungs every 4 hours as needed for Wheezing or Shortness of Breath (Space out to every 6 hours as symptoms improve) Space out to every 6 hours as symptoms improve., Disp-1 Inhaler, R-0Normal      Cholecalciferol (VITAMIN D3 PO) Take 4,000 Units by mouth daily Historical Med      Magnesium 400 MG CAPS Take 2 capsules by mouth daily Historical Med      baclofen (LIORESAL) 10 MG tablet Take 10 mg by mouth 3 times daily as needed Historical Med      traZODone (DESYREL) 50 MG tablet Take 1 tablet by mouth nightly Historical Med      escitalopram (LEXAPRO) 20 MG tablet Take 20 mg by mouth daily Historical Med      Multiple Vitamins-Calcium (ONE-A-DAY WOMENS PO) Take 1 tablet by mouth daily         STOP taking these medications       Calcium Carbonate (CALCIUM 500 PO) Comments:   Reason for Stopping:         diazepam (VALIUM) 5 MG tablet Comments:   Reason for Stopping:             Activity: Up with walker with assistance only  Diet: As tolerated to regular diet    Follow-up with PCP in 5 days. Following with home hospice  Signed:   Anabella Bynum MD MD  6/20/2021  2:35 PM